# Patient Record
Sex: MALE | Race: WHITE | NOT HISPANIC OR LATINO | URBAN - METROPOLITAN AREA
[De-identification: names, ages, dates, MRNs, and addresses within clinical notes are randomized per-mention and may not be internally consistent; named-entity substitution may affect disease eponyms.]

---

## 2017-01-04 ENCOUNTER — HOSPITAL ENCOUNTER (OUTPATIENT)
Dept: PHYSICAL MEDICINE AND REHAB | Facility: CLINIC | Age: 82
Discharge: HOME | End: 2017-01-04
Attending: PHYSICAL MEDICINE & REHABILITATION

## 2017-01-04 ENCOUNTER — COMMUNICATION - HEALTHEAST (OUTPATIENT)
Dept: INTERNAL MEDICINE | Facility: CLINIC | Age: 82
End: 2017-01-04

## 2017-01-04 DIAGNOSIS — Z79.01 ON WARFARIN THERAPY: ICD-10-CM

## 2017-01-04 DIAGNOSIS — M12.88 OTHER SPECIFIC ARTHROPATHIES, NOT ELSEWHERE CLASSIFIED, OTHER SPECIFIED SITE: ICD-10-CM

## 2017-01-04 DIAGNOSIS — M47.816 ARTHROPATHY OF LUMBAR FACET JOINT: ICD-10-CM

## 2017-01-04 DIAGNOSIS — M47.816 LUMBAR SPONDYLOSIS: ICD-10-CM

## 2017-01-04 ASSESSMENT — MIFFLIN-ST. JEOR: SCORE: 1315.41

## 2017-01-07 ENCOUNTER — OFFICE VISIT - HEALTHEAST (OUTPATIENT)
Dept: FAMILY MEDICINE | Facility: CLINIC | Age: 82
End: 2017-01-07

## 2017-01-07 DIAGNOSIS — R10.2 PELVIC PAIN: ICD-10-CM

## 2017-01-13 ENCOUNTER — COMMUNICATION - HEALTHEAST (OUTPATIENT)
Dept: NURSING | Facility: CLINIC | Age: 82
End: 2017-01-13

## 2017-01-13 ENCOUNTER — AMBULATORY - HEALTHEAST (OUTPATIENT)
Dept: LAB | Facility: CLINIC | Age: 82
End: 2017-01-13

## 2017-01-13 DIAGNOSIS — I48.91 A-FIB (H): ICD-10-CM

## 2017-01-20 ENCOUNTER — OFFICE VISIT - HEALTHEAST (OUTPATIENT)
Dept: INTERNAL MEDICINE | Facility: CLINIC | Age: 82
End: 2017-01-20

## 2017-01-20 ENCOUNTER — COMMUNICATION - HEALTHEAST (OUTPATIENT)
Dept: NURSING | Facility: CLINIC | Age: 82
End: 2017-01-20

## 2017-01-20 ENCOUNTER — COMMUNICATION - HEALTHEAST (OUTPATIENT)
Dept: INTERNAL MEDICINE | Facility: CLINIC | Age: 82
End: 2017-01-20

## 2017-01-20 DIAGNOSIS — I48.91 A-FIB (H): ICD-10-CM

## 2017-01-20 DIAGNOSIS — I10 HTN (HYPERTENSION): ICD-10-CM

## 2017-01-20 DIAGNOSIS — R10.2 PELVIC PAIN: ICD-10-CM

## 2017-01-20 DIAGNOSIS — C61 PROSTATE CANCER (H): ICD-10-CM

## 2017-01-20 DIAGNOSIS — I48.20 CHRONIC ATRIAL FIBRILLATION (H): ICD-10-CM

## 2017-01-24 ENCOUNTER — AMBULATORY - HEALTHEAST (OUTPATIENT)
Dept: LAB | Facility: CLINIC | Age: 82
End: 2017-01-24

## 2017-01-24 ENCOUNTER — COMMUNICATION - HEALTHEAST (OUTPATIENT)
Dept: NURSING | Facility: CLINIC | Age: 82
End: 2017-01-24

## 2017-01-24 DIAGNOSIS — I48.91 A-FIB (H): ICD-10-CM

## 2017-01-30 ENCOUNTER — COMMUNICATION - HEALTHEAST (OUTPATIENT)
Dept: INTERNAL MEDICINE | Facility: CLINIC | Age: 82
End: 2017-01-30

## 2017-01-31 ENCOUNTER — COMMUNICATION - HEALTHEAST (OUTPATIENT)
Dept: NURSING | Facility: CLINIC | Age: 82
End: 2017-01-31

## 2017-01-31 ENCOUNTER — HOSPITAL ENCOUNTER (OUTPATIENT)
Dept: PHYSICAL MEDICINE AND REHAB | Facility: CLINIC | Age: 82
Discharge: HOME | End: 2017-01-31
Attending: NURSE PRACTITIONER

## 2017-01-31 ENCOUNTER — AMBULATORY - HEALTHEAST (OUTPATIENT)
Dept: LAB | Facility: CLINIC | Age: 82
End: 2017-01-31

## 2017-01-31 DIAGNOSIS — M47.816 ARTHROPATHY OF LUMBAR FACET JOINT: ICD-10-CM

## 2017-01-31 DIAGNOSIS — M54.50 CHRONIC BILATERAL LOW BACK PAIN WITHOUT SCIATICA: ICD-10-CM

## 2017-01-31 DIAGNOSIS — G89.29 CHRONIC BILATERAL LOW BACK PAIN WITHOUT SCIATICA: ICD-10-CM

## 2017-01-31 DIAGNOSIS — I48.91 A-FIB (H): ICD-10-CM

## 2017-02-07 ENCOUNTER — COMMUNICATION - HEALTHEAST (OUTPATIENT)
Dept: NURSING | Facility: CLINIC | Age: 82
End: 2017-02-07

## 2017-02-07 DIAGNOSIS — I48.20 CHRONIC ATRIAL FIBRILLATION (H): ICD-10-CM

## 2017-02-10 ENCOUNTER — COMMUNICATION - HEALTHEAST (OUTPATIENT)
Dept: NURSING | Facility: CLINIC | Age: 82
End: 2017-02-10

## 2017-02-10 ENCOUNTER — OFFICE VISIT - HEALTHEAST (OUTPATIENT)
Dept: INTERNAL MEDICINE | Facility: CLINIC | Age: 82
End: 2017-02-10

## 2017-02-10 DIAGNOSIS — C61 PROSTATE CANCER (H): ICD-10-CM

## 2017-02-10 DIAGNOSIS — R19.7 INTERMITTENT DIARRHEA: ICD-10-CM

## 2017-02-10 DIAGNOSIS — I10 HTN (HYPERTENSION): ICD-10-CM

## 2017-02-10 DIAGNOSIS — I48.20 CHRONIC ATRIAL FIBRILLATION (H): ICD-10-CM

## 2017-02-10 DIAGNOSIS — R10.2 SUPRAPUBIC PAIN: ICD-10-CM

## 2017-02-10 DIAGNOSIS — R25.2 LEG CRAMPS: ICD-10-CM

## 2017-02-20 ENCOUNTER — RECORDS - HEALTHEAST (OUTPATIENT)
Dept: ADMINISTRATIVE | Facility: OTHER | Age: 82
End: 2017-02-20

## 2017-03-01 ENCOUNTER — AMBULATORY - HEALTHEAST (OUTPATIENT)
Dept: LAB | Facility: CLINIC | Age: 82
End: 2017-03-01

## 2017-03-01 ENCOUNTER — COMMUNICATION - HEALTHEAST (OUTPATIENT)
Dept: NURSING | Facility: CLINIC | Age: 82
End: 2017-03-01

## 2017-03-01 DIAGNOSIS — I48.20 CHRONIC ATRIAL FIBRILLATION (H): ICD-10-CM

## 2017-03-17 ENCOUNTER — COMMUNICATION - HEALTHEAST (OUTPATIENT)
Dept: CARDIOLOGY | Facility: CLINIC | Age: 82
End: 2017-03-17

## 2017-03-17 DIAGNOSIS — I48.91 ATRIAL FIBRILLATION (H): ICD-10-CM

## 2017-03-20 ENCOUNTER — COMMUNICATION - HEALTHEAST (OUTPATIENT)
Dept: CARDIOLOGY | Facility: CLINIC | Age: 82
End: 2017-03-20

## 2017-03-20 DIAGNOSIS — I48.20 CHRONIC ATRIAL FIBRILLATION (H): ICD-10-CM

## 2017-03-20 DIAGNOSIS — I25.10 CORONARY ATHEROSCLEROSIS: ICD-10-CM

## 2017-03-22 ENCOUNTER — COMMUNICATION - HEALTHEAST (OUTPATIENT)
Dept: NURSING | Facility: CLINIC | Age: 82
End: 2017-03-22

## 2017-03-22 ENCOUNTER — AMBULATORY - HEALTHEAST (OUTPATIENT)
Dept: LAB | Facility: CLINIC | Age: 82
End: 2017-03-22

## 2017-03-22 DIAGNOSIS — I48.20 CHRONIC ATRIAL FIBRILLATION (H): ICD-10-CM

## 2017-04-15 ENCOUNTER — COMMUNICATION - HEALTHEAST (OUTPATIENT)
Dept: INTERNAL MEDICINE | Facility: CLINIC | Age: 82
End: 2017-04-15

## 2017-04-15 DIAGNOSIS — I48.91 ATRIAL FIBRILLATION (H): ICD-10-CM

## 2017-04-19 ENCOUNTER — AMBULATORY - HEALTHEAST (OUTPATIENT)
Dept: LAB | Facility: CLINIC | Age: 82
End: 2017-04-19

## 2017-04-19 ENCOUNTER — COMMUNICATION - HEALTHEAST (OUTPATIENT)
Dept: NURSING | Facility: CLINIC | Age: 82
End: 2017-04-19

## 2017-04-19 DIAGNOSIS — I48.20 CHRONIC ATRIAL FIBRILLATION (H): ICD-10-CM

## 2017-04-27 ENCOUNTER — OFFICE VISIT - HEALTHEAST (OUTPATIENT)
Dept: CARDIOLOGY | Facility: CLINIC | Age: 82
End: 2017-04-27

## 2017-04-27 ENCOUNTER — AMBULATORY - HEALTHEAST (OUTPATIENT)
Dept: CARDIOLOGY | Facility: CLINIC | Age: 82
End: 2017-04-27

## 2017-04-27 DIAGNOSIS — Z95.0 PACEMAKER: ICD-10-CM

## 2017-04-27 DIAGNOSIS — I48.20 CHRONIC ATRIAL FIBRILLATION (H): ICD-10-CM

## 2017-04-27 ASSESSMENT — MIFFLIN-ST. JEOR: SCORE: 1376.08

## 2017-05-02 ENCOUNTER — OFFICE VISIT - HEALTHEAST (OUTPATIENT)
Dept: INTERNAL MEDICINE | Facility: CLINIC | Age: 82
End: 2017-05-02

## 2017-05-02 DIAGNOSIS — S90.425A BLISTER OF TOE OF LEFT FOOT, INITIAL ENCOUNTER: ICD-10-CM

## 2017-05-02 ASSESSMENT — MIFFLIN-ST. JEOR: SCORE: 1371.54

## 2017-05-15 ENCOUNTER — RECORDS - HEALTHEAST (OUTPATIENT)
Dept: ADMINISTRATIVE | Facility: OTHER | Age: 82
End: 2017-05-15

## 2017-05-19 ENCOUNTER — COMMUNICATION - HEALTHEAST (OUTPATIENT)
Dept: NURSING | Facility: CLINIC | Age: 82
End: 2017-05-19

## 2017-05-19 ENCOUNTER — AMBULATORY - HEALTHEAST (OUTPATIENT)
Dept: LAB | Facility: CLINIC | Age: 82
End: 2017-05-19

## 2017-05-19 DIAGNOSIS — I48.20 CHRONIC ATRIAL FIBRILLATION (H): ICD-10-CM

## 2017-05-22 ENCOUNTER — RECORDS - HEALTHEAST (OUTPATIENT)
Dept: ADMINISTRATIVE | Facility: OTHER | Age: 82
End: 2017-05-22

## 2017-05-24 ENCOUNTER — AMBULATORY - HEALTHEAST (OUTPATIENT)
Dept: PODIATRY | Age: 82
End: 2017-05-24

## 2017-05-24 DIAGNOSIS — M20.40 HAMMER TOE, UNSPECIFIED LATERALITY: ICD-10-CM

## 2017-05-24 DIAGNOSIS — L97.501 TOE ULCER, UNSPECIFIED LATERALITY, LIMITED TO BREAKDOWN OF SKIN (H): ICD-10-CM

## 2017-06-09 ENCOUNTER — OFFICE VISIT - HEALTHEAST (OUTPATIENT)
Dept: INTERNAL MEDICINE | Facility: CLINIC | Age: 82
End: 2017-06-09

## 2017-06-09 ENCOUNTER — COMMUNICATION - HEALTHEAST (OUTPATIENT)
Dept: NURSING | Facility: CLINIC | Age: 82
End: 2017-06-09

## 2017-06-09 DIAGNOSIS — I10 HTN (HYPERTENSION): ICD-10-CM

## 2017-06-09 DIAGNOSIS — Z66 DNR (DO NOT RESUSCITATE): ICD-10-CM

## 2017-06-09 DIAGNOSIS — C61 PROSTATE CANCER (H): ICD-10-CM

## 2017-06-09 DIAGNOSIS — I48.20 CHRONIC ATRIAL FIBRILLATION (H): ICD-10-CM

## 2017-06-09 ASSESSMENT — MIFFLIN-ST. JEOR: SCORE: 1358.84

## 2017-06-21 ENCOUNTER — OFFICE VISIT - HEALTHEAST (OUTPATIENT)
Dept: PODIATRY | Age: 82
End: 2017-06-21

## 2017-06-21 DIAGNOSIS — L97.501 TOE ULCER, UNSPECIFIED LATERALITY, LIMITED TO BREAKDOWN OF SKIN (H): ICD-10-CM

## 2017-06-21 DIAGNOSIS — M20.40 HAMMER TOE, UNSPECIFIED LATERALITY: ICD-10-CM

## 2017-07-07 ENCOUNTER — AMBULATORY - HEALTHEAST (OUTPATIENT)
Dept: LAB | Facility: CLINIC | Age: 82
End: 2017-07-07

## 2017-07-07 ENCOUNTER — COMMUNICATION - HEALTHEAST (OUTPATIENT)
Dept: NURSING | Facility: CLINIC | Age: 82
End: 2017-07-07

## 2017-07-07 DIAGNOSIS — I48.20 CHRONIC ATRIAL FIBRILLATION (H): ICD-10-CM

## 2017-07-19 ENCOUNTER — OFFICE VISIT - HEALTHEAST (OUTPATIENT)
Dept: PODIATRY | Age: 82
End: 2017-07-19

## 2017-07-19 DIAGNOSIS — M20.40 HAMMER TOE, UNSPECIFIED LATERALITY: ICD-10-CM

## 2017-07-19 DIAGNOSIS — L97.501 TOE ULCER, UNSPECIFIED LATERALITY, LIMITED TO BREAKDOWN OF SKIN (H): ICD-10-CM

## 2017-07-19 ASSESSMENT — MIFFLIN-ST. JEOR: SCORE: 1353.4

## 2017-07-30 ENCOUNTER — COMMUNICATION - HEALTHEAST (OUTPATIENT)
Dept: INTERNAL MEDICINE | Facility: CLINIC | Age: 82
End: 2017-07-30

## 2017-08-04 ENCOUNTER — AMBULATORY - HEALTHEAST (OUTPATIENT)
Dept: LAB | Facility: CLINIC | Age: 82
End: 2017-08-04

## 2017-08-04 ENCOUNTER — COMMUNICATION - HEALTHEAST (OUTPATIENT)
Dept: INTERNAL MEDICINE | Facility: CLINIC | Age: 82
End: 2017-08-04

## 2017-08-04 DIAGNOSIS — I48.20 CHRONIC ATRIAL FIBRILLATION (H): ICD-10-CM

## 2017-08-14 ENCOUNTER — OFFICE VISIT - HEALTHEAST (OUTPATIENT)
Dept: PODIATRY | Facility: CLINIC | Age: 82
End: 2017-08-14

## 2017-08-14 DIAGNOSIS — L84 CALLUS OF FOOT: ICD-10-CM

## 2017-08-14 DIAGNOSIS — M79.673 PAIN OF FOOT, UNSPECIFIED LATERALITY: ICD-10-CM

## 2017-08-14 DIAGNOSIS — M20.40 HAMMER TOE, UNSPECIFIED LATERALITY: ICD-10-CM

## 2017-08-14 DIAGNOSIS — L60.2 ONYCHAUXIS: ICD-10-CM

## 2017-08-14 ASSESSMENT — MIFFLIN-ST. JEOR: SCORE: 1353.4

## 2017-08-25 ENCOUNTER — COMMUNICATION - HEALTHEAST (OUTPATIENT)
Dept: NURSING | Facility: CLINIC | Age: 82
End: 2017-08-25

## 2017-08-25 ENCOUNTER — OFFICE VISIT - HEALTHEAST (OUTPATIENT)
Dept: INTERNAL MEDICINE | Facility: CLINIC | Age: 82
End: 2017-08-25

## 2017-08-25 DIAGNOSIS — R19.7 INTERMITTENT DIARRHEA: ICD-10-CM

## 2017-08-25 DIAGNOSIS — I50.9 CHF (CONGESTIVE HEART FAILURE) (H): ICD-10-CM

## 2017-08-25 DIAGNOSIS — I48.91 A-FIB (H): ICD-10-CM

## 2017-08-25 DIAGNOSIS — I48.20 CHRONIC ATRIAL FIBRILLATION (H): ICD-10-CM

## 2017-08-25 ASSESSMENT — MIFFLIN-ST. JEOR: SCORE: 1344.33

## 2017-08-28 ENCOUNTER — COMMUNICATION - HEALTHEAST (OUTPATIENT)
Dept: INTERNAL MEDICINE | Facility: CLINIC | Age: 82
End: 2017-08-28

## 2017-09-08 ENCOUNTER — AMBULATORY - HEALTHEAST (OUTPATIENT)
Dept: LAB | Facility: CLINIC | Age: 82
End: 2017-09-08

## 2017-09-08 ENCOUNTER — COMMUNICATION - HEALTHEAST (OUTPATIENT)
Dept: NURSING | Facility: CLINIC | Age: 82
End: 2017-09-08

## 2017-09-08 DIAGNOSIS — I48.20 CHRONIC ATRIAL FIBRILLATION (H): ICD-10-CM

## 2017-09-22 ENCOUNTER — COMMUNICATION - HEALTHEAST (OUTPATIENT)
Dept: NURSING | Facility: CLINIC | Age: 82
End: 2017-09-22

## 2017-09-22 ENCOUNTER — AMBULATORY - HEALTHEAST (OUTPATIENT)
Dept: LAB | Facility: CLINIC | Age: 82
End: 2017-09-22

## 2017-09-22 DIAGNOSIS — I48.20 CHRONIC ATRIAL FIBRILLATION (H): ICD-10-CM

## 2017-10-04 ENCOUNTER — HOSPITAL ENCOUNTER (OUTPATIENT)
Dept: PHYSICAL MEDICINE AND REHAB | Facility: CLINIC | Age: 82
Discharge: HOME | End: 2017-10-04
Attending: NURSE PRACTITIONER

## 2017-10-04 DIAGNOSIS — M47.816 ARTHROPATHY OF LUMBAR FACET JOINT: ICD-10-CM

## 2017-10-04 DIAGNOSIS — Z98.890 HISTORY OF LUMBAR SURGERY: ICD-10-CM

## 2017-10-04 DIAGNOSIS — M54.50 CHRONIC BILATERAL LOW BACK PAIN WITHOUT SCIATICA: ICD-10-CM

## 2017-10-04 DIAGNOSIS — G89.29 CHRONIC BILATERAL LOW BACK PAIN WITHOUT SCIATICA: ICD-10-CM

## 2017-10-06 ENCOUNTER — COMMUNICATION - HEALTHEAST (OUTPATIENT)
Dept: NURSING | Facility: CLINIC | Age: 82
End: 2017-10-06

## 2017-10-06 ENCOUNTER — OFFICE VISIT - HEALTHEAST (OUTPATIENT)
Dept: INTERNAL MEDICINE | Facility: CLINIC | Age: 82
End: 2017-10-06

## 2017-10-06 DIAGNOSIS — Z00.00 ROUTINE PHYSICAL EXAMINATION: ICD-10-CM

## 2017-10-06 DIAGNOSIS — M17.9 KNEE OSTEOARTHRITIS: ICD-10-CM

## 2017-10-06 DIAGNOSIS — Z95.0 PACEMAKER: ICD-10-CM

## 2017-10-06 DIAGNOSIS — M54.50 LOW BACK PAIN: ICD-10-CM

## 2017-10-06 DIAGNOSIS — I63.9 CEREBRAL INFARCTION (H): ICD-10-CM

## 2017-10-06 DIAGNOSIS — I10 HTN (HYPERTENSION): ICD-10-CM

## 2017-10-06 DIAGNOSIS — Z66 DNR (DO NOT RESUSCITATE): ICD-10-CM

## 2017-10-06 DIAGNOSIS — C61 PROSTATE CANCER (H): ICD-10-CM

## 2017-10-06 DIAGNOSIS — I48.20 CHRONIC ATRIAL FIBRILLATION (H): ICD-10-CM

## 2017-10-06 DIAGNOSIS — E53.8 VITAMIN B12 DEFICIENCY: ICD-10-CM

## 2017-10-06 ASSESSMENT — MIFFLIN-ST. JEOR: SCORE: 1314.17

## 2017-10-10 ENCOUNTER — OFFICE VISIT - HEALTHEAST (OUTPATIENT)
Dept: PHYSICAL THERAPY | Facility: CLINIC | Age: 82
End: 2017-10-10

## 2017-10-10 DIAGNOSIS — G89.29 CHRONIC BILATERAL LOW BACK PAIN WITHOUT SCIATICA: ICD-10-CM

## 2017-10-10 DIAGNOSIS — M54.50 CHRONIC BILATERAL LOW BACK PAIN WITHOUT SCIATICA: ICD-10-CM

## 2017-10-10 DIAGNOSIS — R29.3 POOR POSTURE: ICD-10-CM

## 2017-10-14 ENCOUNTER — COMMUNICATION - HEALTHEAST (OUTPATIENT)
Dept: INTERNAL MEDICINE | Facility: CLINIC | Age: 82
End: 2017-10-14

## 2017-10-14 DIAGNOSIS — I48.91 ATRIAL FIBRILLATION (H): ICD-10-CM

## 2017-10-16 ENCOUNTER — AMBULATORY - HEALTHEAST (OUTPATIENT)
Dept: PODIATRY | Facility: CLINIC | Age: 82
End: 2017-10-16

## 2017-10-16 DIAGNOSIS — L84 CALLUS OF FOOT: ICD-10-CM

## 2017-10-16 DIAGNOSIS — M79.673 PAIN OF FOOT, UNSPECIFIED LATERALITY: ICD-10-CM

## 2017-10-16 DIAGNOSIS — L60.2 ONYCHAUXIS: ICD-10-CM

## 2017-10-16 DIAGNOSIS — M20.40 HAMMER TOE, UNSPECIFIED LATERALITY: ICD-10-CM

## 2017-10-16 ASSESSMENT — MIFFLIN-ST. JEOR: SCORE: 1311.44

## 2017-10-20 ENCOUNTER — AMBULATORY - HEALTHEAST (OUTPATIENT)
Dept: CARDIOLOGY | Facility: CLINIC | Age: 82
End: 2017-10-20

## 2017-10-20 DIAGNOSIS — Z95.0 PACEMAKER: ICD-10-CM

## 2017-10-20 LAB — HCC DEVICE COMMENTS: NORMAL

## 2017-10-20 ASSESSMENT — MIFFLIN-ST. JEOR: SCORE: 1329.59

## 2017-10-27 ENCOUNTER — OFFICE VISIT - HEALTHEAST (OUTPATIENT)
Dept: CARDIOLOGY | Facility: CLINIC | Age: 82
End: 2017-10-27

## 2017-10-27 DIAGNOSIS — I48.91 A-FIB (H): ICD-10-CM

## 2017-10-27 DIAGNOSIS — Z95.0 PACEMAKER: ICD-10-CM

## 2017-10-27 ASSESSMENT — MIFFLIN-ST. JEOR: SCORE: 1325.05

## 2017-10-31 ENCOUNTER — COMMUNICATION - HEALTHEAST (OUTPATIENT)
Dept: PHYSICAL MEDICINE AND REHAB | Facility: CLINIC | Age: 82
End: 2017-10-31

## 2017-11-01 ENCOUNTER — HOSPITAL ENCOUNTER (OUTPATIENT)
Dept: PHYSICAL MEDICINE AND REHAB | Facility: CLINIC | Age: 82
Discharge: HOME | End: 2017-11-01
Attending: NURSE PRACTITIONER

## 2017-11-01 ENCOUNTER — HOSPITAL ENCOUNTER (OUTPATIENT)
Dept: CARDIOLOGY | Facility: HOSPITAL | Age: 82
Discharge: HOME | End: 2017-11-01
Attending: INTERNAL MEDICINE

## 2017-11-01 DIAGNOSIS — M48.061 FORAMINAL STENOSIS OF LUMBAR REGION: ICD-10-CM

## 2017-11-01 DIAGNOSIS — M54.16 RIGHT LUMBAR RADICULITIS: ICD-10-CM

## 2017-11-01 DIAGNOSIS — M54.50 CHRONIC BILATERAL LOW BACK PAIN WITHOUT SCIATICA: ICD-10-CM

## 2017-11-01 DIAGNOSIS — I48.91 A-FIB (H): ICD-10-CM

## 2017-11-01 DIAGNOSIS — M54.41 ACUTE RIGHT-SIDED LOW BACK PAIN WITH RIGHT-SIDED SCIATICA: ICD-10-CM

## 2017-11-01 DIAGNOSIS — G89.29 CHRONIC BILATERAL LOW BACK PAIN WITHOUT SCIATICA: ICD-10-CM

## 2017-11-01 DIAGNOSIS — M41.9 SCOLIOSIS: ICD-10-CM

## 2017-11-01 LAB
AORTIC ROOT: 3.3 CM
BSA FOR ECHO PROCEDURE: 1.85 M2
CV BLOOD PRESSURE: NORMAL MMHG
CV ECHO HEIGHT: 68.5 IN
CV ECHO WEIGHT: 156 LBS
DOP CALC LVOT AREA: 3.14 CM2
DOP CALC LVOT DIAMETER: 2 CM
DOP CALC LVOT PEAK VEL: 69.5 CM/S
DOP CALC LVOT STROKE VOLUME: 41.8 CM3
DOP CALCLVOT PEAK VEL VTI: 13.3 CM
EJECTION FRACTION: 66 % (ref 55–75)
FRACTIONAL SHORTENING: 38.4 % (ref 28–44)
INTERVENTRICULAR SEPTUM IN END DIASTOLE: 1.22 CM (ref 0.6–1)
IVS/PW RATIO: 1
LEFT ATRIUM SIZE: 4 CM
LEFT ATRIUM TO AORTIC ROOT RATIO: 1.21 NO UNITS
LEFT VENTRICLE CARDIAC INDEX: 1.7 L/MIN/M2
LEFT VENTRICLE CARDIAC OUTPUT: 3.2 L/MIN
LEFT VENTRICLE DIASTOLIC VOLUME INDEX: 52.6 CM3/M2 (ref 34–74)
LEFT VENTRICLE DIASTOLIC VOLUME: 97.4 CM3 (ref 62–150)
LEFT VENTRICLE HEART RATE: 76 BPM
LEFT VENTRICLE MASS INDEX: 112.7 G/M2
LEFT VENTRICLE SYSTOLIC VOLUME INDEX: 17.7 CM3/M2 (ref 11–31)
LEFT VENTRICLE SYSTOLIC VOLUME: 32.7 CM3 (ref 21–61)
LEFT VENTRICULAR INTERNAL DIMENSION IN DIASTOLE: 4.58 CM (ref 4.2–5.8)
LEFT VENTRICULAR INTERNAL DIMENSION IN SYSTOLE: 2.82 CM (ref 2.5–4)
LEFT VENTRICULAR MASS: 208.5 G
LEFT VENTRICULAR OUTFLOW TRACT MEAN GRADIENT: 1 MMHG
LEFT VENTRICULAR OUTFLOW TRACT MEAN VELOCITY: 45 CM/S
LEFT VENTRICULAR OUTFLOW TRACT PEAK GRADIENT: 2 MMHG
LEFT VENTRICULAR POSTERIOR WALL IN END DIASTOLE: 1.22 CM (ref 0.6–1)
LV STROKE VOLUME INDEX: 22.6 ML/M2
MV E'TISSUE VEL-LAT: 14.6 CM/S
MV E'TISSUE VEL-MED: 9.77 CM/S
NUC REST DIASTOLIC VOLUME INDEX: 2496 LBS
NUC REST SYSTOLIC VOLUME INDEX: 68.5 IN
PR MAX PG: 7 MMHG
PR PEAK VELOCITY: 128 CM/S
PR PEAK VELOCITY: 128 CM/S
TRICUSPID REGURGITATION PEAK PRESSURE GRADIENT: 38.7 MMHG
TRICUSPID VALVE ANULAR PLANE SYSTOLIC EXCURSION: 1.5 CM
TRICUSPID VALVE PEAK REGURGITANT VELOCITY: 311 CM/S

## 2017-11-01 ASSESSMENT — MIFFLIN-ST. JEOR: SCORE: 1325.05

## 2017-11-03 ENCOUNTER — COMMUNICATION - HEALTHEAST (OUTPATIENT)
Dept: NURSING | Facility: CLINIC | Age: 82
End: 2017-11-03

## 2017-11-03 ENCOUNTER — AMBULATORY - HEALTHEAST (OUTPATIENT)
Dept: LAB | Facility: CLINIC | Age: 82
End: 2017-11-03

## 2017-11-03 DIAGNOSIS — I48.20 CHRONIC ATRIAL FIBRILLATION (H): ICD-10-CM

## 2017-11-06 ENCOUNTER — COMMUNICATION - HEALTHEAST (OUTPATIENT)
Dept: INTERNAL MEDICINE | Facility: CLINIC | Age: 82
End: 2017-11-06

## 2017-11-06 ENCOUNTER — AMBULATORY - HEALTHEAST (OUTPATIENT)
Dept: CARDIOLOGY | Facility: CLINIC | Age: 82
End: 2017-11-06

## 2017-11-06 DIAGNOSIS — I48.20 CHRONIC ATRIAL FIBRILLATION (H): ICD-10-CM

## 2017-11-06 DIAGNOSIS — I10 HTN (HYPERTENSION): ICD-10-CM

## 2017-11-07 ENCOUNTER — AMBULATORY - HEALTHEAST (OUTPATIENT)
Dept: LAB | Facility: CLINIC | Age: 82
End: 2017-11-07

## 2017-11-07 ENCOUNTER — COMMUNICATION - HEALTHEAST (OUTPATIENT)
Dept: NURSING | Facility: CLINIC | Age: 82
End: 2017-11-07

## 2017-11-07 ENCOUNTER — COMMUNICATION - HEALTHEAST (OUTPATIENT)
Dept: INTERNAL MEDICINE | Facility: CLINIC | Age: 82
End: 2017-11-07

## 2017-11-07 DIAGNOSIS — I48.20 CHRONIC ATRIAL FIBRILLATION (H): ICD-10-CM

## 2017-11-07 DIAGNOSIS — I10 HTN (HYPERTENSION): ICD-10-CM

## 2017-11-10 ENCOUNTER — OFFICE VISIT - HEALTHEAST (OUTPATIENT)
Dept: INTERNAL MEDICINE | Facility: CLINIC | Age: 82
End: 2017-11-10

## 2017-11-10 DIAGNOSIS — I63.9 CEREBRAL INFARCTION (H): ICD-10-CM

## 2017-11-10 DIAGNOSIS — I10 HTN (HYPERTENSION): ICD-10-CM
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
## 2017-11-10 DIAGNOSIS — S51.811A SKIN TEAR OF RIGHT FOREARM WITHOUT COMPLICATION: ICD-10-CM

## 2017-11-10 DIAGNOSIS — I48.20 CHRONIC ATRIAL FIBRILLATION (H): ICD-10-CM

## 2017-11-14 ENCOUNTER — RECORDS - HEALTHEAST (OUTPATIENT)
Dept: ADMINISTRATIVE | Facility: OTHER | Age: 82
End: 2017-11-14

## 2017-11-14 ENCOUNTER — OFFICE VISIT - HEALTHEAST (OUTPATIENT)
Dept: INTERNAL MEDICINE | Facility: CLINIC | Age: 82
End: 2017-11-14

## 2017-11-14 DIAGNOSIS — R05.9 COUGH: ICD-10-CM

## 2017-11-14 ASSESSMENT — MIFFLIN-ST. JEOR: SCORE: 1352.27

## 2017-11-15 ENCOUNTER — HOSPITAL ENCOUNTER (OUTPATIENT)
Dept: PHYSICAL MEDICINE AND REHAB | Facility: CLINIC | Age: 82
Discharge: HOME | End: 2017-11-15
Attending: NURSE PRACTITIONER

## 2017-11-15 DIAGNOSIS — M54.50 CHRONIC BILATERAL LOW BACK PAIN WITHOUT SCIATICA: ICD-10-CM

## 2017-11-15 DIAGNOSIS — G89.29 CHRONIC BILATERAL LOW BACK PAIN WITHOUT SCIATICA: ICD-10-CM

## 2017-11-15 DIAGNOSIS — M54.41 ACUTE RIGHT-SIDED LOW BACK PAIN WITH RIGHT-SIDED SCIATICA: ICD-10-CM

## 2017-12-01 ENCOUNTER — AMBULATORY - HEALTHEAST (OUTPATIENT)
Dept: LAB | Facility: CLINIC | Age: 82
End: 2017-12-01

## 2017-12-01 ENCOUNTER — COMMUNICATION - HEALTHEAST (OUTPATIENT)
Dept: NURSING | Facility: CLINIC | Age: 82
End: 2017-12-01

## 2017-12-01 DIAGNOSIS — I48.20 CHRONIC ATRIAL FIBRILLATION (H): ICD-10-CM

## 2017-12-18 ENCOUNTER — AMBULATORY - HEALTHEAST (OUTPATIENT)
Dept: PODIATRY | Facility: CLINIC | Age: 82
End: 2017-12-18

## 2017-12-18 DIAGNOSIS — L84 CALLUS OF FOOT: ICD-10-CM

## 2017-12-18 DIAGNOSIS — L60.2 ONYCHAUXIS: ICD-10-CM

## 2017-12-18 DIAGNOSIS — M79.673 PAIN OF FOOT, UNSPECIFIED LATERALITY: ICD-10-CM

## 2017-12-18 DIAGNOSIS — M20.40 HAMMER TOE, UNSPECIFIED LATERALITY: ICD-10-CM

## 2017-12-18 ASSESSMENT — MIFFLIN-ST. JEOR: SCORE: 1347.73

## 2018-01-05 ENCOUNTER — COMMUNICATION - HEALTHEAST (OUTPATIENT)
Dept: NURSING | Facility: CLINIC | Age: 83
End: 2018-01-05

## 2018-01-05 ENCOUNTER — AMBULATORY - HEALTHEAST (OUTPATIENT)
Dept: LAB | Facility: CLINIC | Age: 83
End: 2018-01-05

## 2018-01-05 DIAGNOSIS — I48.20 CHRONIC ATRIAL FIBRILLATION (H): ICD-10-CM

## 2018-01-05 LAB — INR PPP: 2.1 (ref 0.9–1.1)

## 2018-01-08 ENCOUNTER — COMMUNICATION - HEALTHEAST (OUTPATIENT)
Dept: NURSING | Facility: CLINIC | Age: 83
End: 2018-01-08

## 2018-01-08 DIAGNOSIS — I48.20 CHRONIC ATRIAL FIBRILLATION (H): ICD-10-CM

## 2018-01-15 ENCOUNTER — OFFICE VISIT - HEALTHEAST (OUTPATIENT)
Dept: INTERNAL MEDICINE | Facility: CLINIC | Age: 83
End: 2018-01-15

## 2018-01-15 DIAGNOSIS — R05.9 COUGH: ICD-10-CM

## 2018-01-21 ENCOUNTER — COMMUNICATION - HEALTHEAST (OUTPATIENT)
Dept: INTERNAL MEDICINE | Facility: CLINIC | Age: 83
End: 2018-01-21

## 2018-01-21 DIAGNOSIS — C61 PROSTATE CANCER (H): ICD-10-CM

## 2018-02-12 ENCOUNTER — COMMUNICATION - HEALTHEAST (OUTPATIENT)
Dept: CARDIOLOGY | Facility: CLINIC | Age: 83
End: 2018-02-12

## 2018-02-12 DIAGNOSIS — I25.10 CORONARY ATHEROSCLEROSIS: ICD-10-CM

## 2018-02-12 DIAGNOSIS — I48.20 CHRONIC ATRIAL FIBRILLATION (H): ICD-10-CM

## 2018-02-13 ENCOUNTER — RECORDS - HEALTHEAST (OUTPATIENT)
Dept: ADMINISTRATIVE | Facility: OTHER | Age: 83
End: 2018-02-13

## 2018-02-16 ENCOUNTER — AMBULATORY - HEALTHEAST (OUTPATIENT)
Dept: LAB | Facility: CLINIC | Age: 83
End: 2018-02-16

## 2018-02-16 ENCOUNTER — COMMUNICATION - HEALTHEAST (OUTPATIENT)
Dept: NURSING | Facility: CLINIC | Age: 83
End: 2018-02-16

## 2018-02-16 DIAGNOSIS — I48.20 CHRONIC ATRIAL FIBRILLATION (H): ICD-10-CM

## 2018-02-16 LAB — INR PPP: 2 (ref 0.9–1.1)

## 2018-02-19 ENCOUNTER — AMBULATORY - HEALTHEAST (OUTPATIENT)
Dept: PODIATRY | Facility: CLINIC | Age: 83
End: 2018-02-19

## 2018-02-19 DIAGNOSIS — L60.2 ONYCHAUXIS: ICD-10-CM

## 2018-02-19 DIAGNOSIS — M79.673 PAIN OF FOOT, UNSPECIFIED LATERALITY: ICD-10-CM

## 2018-02-19 ASSESSMENT — MIFFLIN-ST. JEOR: SCORE: 1365.88

## 2018-03-08 ENCOUNTER — COMMUNICATION - HEALTHEAST (OUTPATIENT)
Dept: INTERNAL MEDICINE | Facility: CLINIC | Age: 83
End: 2018-03-08

## 2018-03-08 DIAGNOSIS — I48.91 ATRIAL FIBRILLATION (H): ICD-10-CM

## 2018-03-15 ENCOUNTER — COMMUNICATION - HEALTHEAST (OUTPATIENT)
Dept: SCHEDULING | Facility: CLINIC | Age: 83
End: 2018-03-15

## 2018-03-30 ENCOUNTER — COMMUNICATION - HEALTHEAST (OUTPATIENT)
Dept: ANTICOAGULATION | Facility: CLINIC | Age: 83
End: 2018-03-30

## 2018-03-30 ENCOUNTER — AMBULATORY - HEALTHEAST (OUTPATIENT)
Dept: LAB | Facility: CLINIC | Age: 83
End: 2018-03-30

## 2018-03-30 DIAGNOSIS — I48.20 CHRONIC ATRIAL FIBRILLATION (H): ICD-10-CM

## 2018-03-30 LAB — INR PPP: 2 (ref 0.9–1.1)

## 2018-04-25 ENCOUNTER — AMBULATORY - HEALTHEAST (OUTPATIENT)
Dept: CARDIOLOGY | Facility: CLINIC | Age: 83
End: 2018-04-25

## 2018-04-25 ENCOUNTER — OFFICE VISIT - HEALTHEAST (OUTPATIENT)
Dept: CARDIOLOGY | Facility: CLINIC | Age: 83
End: 2018-04-25

## 2018-04-25 DIAGNOSIS — Z95.0 PACEMAKER: ICD-10-CM

## 2018-04-25 DIAGNOSIS — I10 ESSENTIAL HYPERTENSION: ICD-10-CM

## 2018-04-25 DIAGNOSIS — I48.19 PERSISTENT ATRIAL FIBRILLATION (H): ICD-10-CM

## 2018-04-25 LAB
DIGOXIN LEVEL LHE- HISTORICAL: 0.9 NG/ML (ref 0.5–2)
HCC DEVICE COMMENTS: NORMAL

## 2018-04-25 ASSESSMENT — MIFFLIN-ST. JEOR: SCORE: 1365.88

## 2018-04-27 ENCOUNTER — OFFICE VISIT - HEALTHEAST (OUTPATIENT)
Dept: CARDIOLOGY | Facility: CLINIC | Age: 83
End: 2018-04-27

## 2018-04-27 DIAGNOSIS — Z79.899 MEDICATION MANAGEMENT: ICD-10-CM

## 2018-05-03 ENCOUNTER — COMMUNICATION - HEALTHEAST (OUTPATIENT)
Dept: CARDIOLOGY | Facility: CLINIC | Age: 83
End: 2018-05-03

## 2018-05-08 ENCOUNTER — AMBULATORY - HEALTHEAST (OUTPATIENT)
Dept: ANTICOAGULATION | Facility: CLINIC | Age: 83
End: 2018-05-08

## 2018-05-15 ENCOUNTER — COMMUNICATION - HEALTHEAST (OUTPATIENT)
Dept: CARDIOLOGY | Facility: CLINIC | Age: 83
End: 2018-05-15

## 2018-05-15 ENCOUNTER — COMMUNICATION - HEALTHEAST (OUTPATIENT)
Dept: SCHEDULING | Facility: CLINIC | Age: 83
End: 2018-05-15

## 2018-05-15 ENCOUNTER — OFFICE VISIT - HEALTHEAST (OUTPATIENT)
Dept: CARDIOLOGY | Facility: CLINIC | Age: 83
End: 2018-05-15

## 2018-05-15 DIAGNOSIS — I10 HYPERTENSION: ICD-10-CM

## 2018-05-15 DIAGNOSIS — I10 HTN (HYPERTENSION): ICD-10-CM

## 2018-05-15 DIAGNOSIS — I10 ESSENTIAL HYPERTENSION: ICD-10-CM

## 2018-05-15 DIAGNOSIS — I48.20 CHRONIC ATRIAL FIBRILLATION (H): ICD-10-CM

## 2018-05-15 ASSESSMENT — MIFFLIN-ST. JEOR: SCORE: 1346.6

## 2018-05-19 ENCOUNTER — COMMUNICATION - HEALTHEAST (OUTPATIENT)
Dept: CARDIOLOGY | Facility: CLINIC | Age: 83
End: 2018-05-19

## 2018-05-19 DIAGNOSIS — I48.20 CHRONIC ATRIAL FIBRILLATION (H): ICD-10-CM

## 2018-05-22 ENCOUNTER — OFFICE VISIT - HEALTHEAST (OUTPATIENT)
Dept: INTERNAL MEDICINE | Facility: CLINIC | Age: 83
End: 2018-05-22

## 2018-05-22 ENCOUNTER — COMMUNICATION - HEALTHEAST (OUTPATIENT)
Dept: ANTICOAGULATION | Facility: CLINIC | Age: 83
End: 2018-05-22

## 2018-05-22 ENCOUNTER — AMBULATORY - HEALTHEAST (OUTPATIENT)
Dept: LAB | Facility: CLINIC | Age: 83
End: 2018-05-22

## 2018-05-22 DIAGNOSIS — M54.50 LOW BACK PAIN: ICD-10-CM

## 2018-05-22 DIAGNOSIS — R41.3 SHORT-TERM MEMORY LOSS: ICD-10-CM

## 2018-05-22 DIAGNOSIS — I48.20 CHRONIC ATRIAL FIBRILLATION (H): ICD-10-CM

## 2018-05-22 DIAGNOSIS — Z91.199 PROBLEM WITH MEDICAL CARE COMPLIANCE: ICD-10-CM

## 2018-05-22 DIAGNOSIS — I10 HTN (HYPERTENSION): ICD-10-CM

## 2018-05-22 DIAGNOSIS — M48.00 MULTILEVEL FORAMINAL STENOSIS: ICD-10-CM

## 2018-05-22 LAB — INR PPP: 2 (ref 0.9–1.1)

## 2018-05-30 ENCOUNTER — OFFICE VISIT - HEALTHEAST (OUTPATIENT)
Dept: CARDIOLOGY | Facility: CLINIC | Age: 83
End: 2018-05-30

## 2018-05-30 DIAGNOSIS — I48.20 CHRONIC ATRIAL FIBRILLATION (H): ICD-10-CM

## 2018-05-30 DIAGNOSIS — I10 HTN (HYPERTENSION): ICD-10-CM

## 2018-05-30 ASSESSMENT — MIFFLIN-ST. JEOR: SCORE: 1351.14

## 2018-06-11 ENCOUNTER — RECORDS - HEALTHEAST (OUTPATIENT)
Dept: ADMINISTRATIVE | Facility: OTHER | Age: 83
End: 2018-06-11

## 2018-06-18 ENCOUNTER — RECORDS - HEALTHEAST (OUTPATIENT)
Dept: ADMINISTRATIVE | Facility: OTHER | Age: 83
End: 2018-06-18

## 2018-06-29 ENCOUNTER — COMMUNICATION - HEALTHEAST (OUTPATIENT)
Dept: INTERNAL MEDICINE | Facility: CLINIC | Age: 83
End: 2018-06-29

## 2018-06-29 DIAGNOSIS — I10 HTN (HYPERTENSION): ICD-10-CM

## 2018-07-02 ENCOUNTER — COMMUNICATION - HEALTHEAST (OUTPATIENT)
Dept: CARDIOLOGY | Facility: CLINIC | Age: 83
End: 2018-07-02

## 2018-07-02 DIAGNOSIS — I10 HTN (HYPERTENSION): ICD-10-CM

## 2018-07-03 ENCOUNTER — COMMUNICATION - HEALTHEAST (OUTPATIENT)
Dept: ANTICOAGULATION | Facility: CLINIC | Age: 83
End: 2018-07-03

## 2018-07-03 ENCOUNTER — AMBULATORY - HEALTHEAST (OUTPATIENT)
Dept: LAB | Facility: CLINIC | Age: 83
End: 2018-07-03

## 2018-07-03 ENCOUNTER — AMBULATORY - HEALTHEAST (OUTPATIENT)
Dept: NURSING | Facility: CLINIC | Age: 83
End: 2018-07-03

## 2018-07-03 ENCOUNTER — COMMUNICATION - HEALTHEAST (OUTPATIENT)
Dept: INTERNAL MEDICINE | Facility: CLINIC | Age: 83
End: 2018-07-03

## 2018-07-03 DIAGNOSIS — I48.20 CHRONIC ATRIAL FIBRILLATION (H): ICD-10-CM

## 2018-07-03 LAB — INR PPP: 2.5 (ref 0.9–1.1)

## 2018-07-15 ENCOUNTER — COMMUNICATION - HEALTHEAST (OUTPATIENT)
Dept: INTERNAL MEDICINE | Facility: CLINIC | Age: 83
End: 2018-07-15

## 2018-07-15 DIAGNOSIS — C61 PROSTATE CANCER (H): ICD-10-CM

## 2018-08-09 ENCOUNTER — COMMUNICATION - HEALTHEAST (OUTPATIENT)
Dept: INTERNAL MEDICINE | Facility: CLINIC | Age: 83
End: 2018-08-09

## 2018-08-14 ENCOUNTER — OFFICE VISIT - HEALTHEAST (OUTPATIENT)
Dept: CARDIOLOGY | Facility: CLINIC | Age: 83
End: 2018-08-14

## 2018-08-14 DIAGNOSIS — I10 ESSENTIAL HYPERTENSION: ICD-10-CM

## 2018-08-14 DIAGNOSIS — Z95.0 PACEMAKER: ICD-10-CM

## 2018-08-14 DIAGNOSIS — I48.20 CHRONIC ATRIAL FIBRILLATION (H): ICD-10-CM

## 2018-08-14 ASSESSMENT — MIFFLIN-ST. JEOR: SCORE: 1323.92

## 2018-08-15 ENCOUNTER — COMMUNICATION - HEALTHEAST (OUTPATIENT)
Dept: INTERNAL MEDICINE | Facility: CLINIC | Age: 83
End: 2018-08-15

## 2018-08-15 DIAGNOSIS — I48.91 ATRIAL FIBRILLATION (H): ICD-10-CM

## 2018-08-16 RX ORDER — WARFARIN SODIUM 2.5 MG/1
1.25-2.5 TABLET ORAL DAILY
Qty: 75 TABLET | Refills: 1 | Status: SHIPPED | OUTPATIENT
Start: 2018-08-16

## 2018-08-28 ENCOUNTER — COMMUNICATION - HEALTHEAST (OUTPATIENT)
Dept: ANTICOAGULATION | Facility: CLINIC | Age: 83
End: 2018-08-28

## 2018-08-28 ENCOUNTER — COMMUNICATION - HEALTHEAST (OUTPATIENT)
Dept: INTERNAL MEDICINE | Facility: CLINIC | Age: 83
End: 2018-08-28

## 2018-08-28 ENCOUNTER — AMBULATORY - HEALTHEAST (OUTPATIENT)
Dept: LAB | Facility: CLINIC | Age: 83
End: 2018-08-28

## 2018-08-28 DIAGNOSIS — I48.20 CHRONIC ATRIAL FIBRILLATION (H): ICD-10-CM

## 2018-08-28 LAB — INR PPP: 2.7 (ref 0.9–1.1)

## 2018-09-04 ENCOUNTER — AMBULATORY - HEALTHEAST (OUTPATIENT)
Dept: INTERNAL MEDICINE | Facility: CLINIC | Age: 83
End: 2018-09-04

## 2018-09-10 ENCOUNTER — RECORDS - HEALTHEAST (OUTPATIENT)
Dept: ADMINISTRATIVE | Facility: OTHER | Age: 83
End: 2018-09-10

## 2018-09-20 ENCOUNTER — COMMUNICATION - HEALTHEAST (OUTPATIENT)
Dept: SCHEDULING | Facility: CLINIC | Age: 83
End: 2018-09-20

## 2018-09-21 ENCOUNTER — COMMUNICATION - HEALTHEAST (OUTPATIENT)
Dept: INTERNAL MEDICINE | Facility: CLINIC | Age: 83
End: 2018-09-21

## 2018-09-28 ENCOUNTER — OFFICE VISIT - HEALTHEAST (OUTPATIENT)
Dept: CARDIOLOGY | Facility: CLINIC | Age: 83
End: 2018-09-28

## 2018-09-28 ENCOUNTER — AMBULATORY - HEALTHEAST (OUTPATIENT)
Dept: CARDIOLOGY | Facility: CLINIC | Age: 83
End: 2018-09-28

## 2018-09-28 DIAGNOSIS — I48.20 CHRONIC ATRIAL FIBRILLATION (H): ICD-10-CM

## 2018-09-28 DIAGNOSIS — Z79.01 ANTICOAGULATION GOAL OF INR 2 TO 3: ICD-10-CM

## 2018-09-28 DIAGNOSIS — Z95.0 PACEMAKER: ICD-10-CM

## 2018-09-28 DIAGNOSIS — Z95.0 CARDIAC PACEMAKER IN SITU: ICD-10-CM

## 2018-09-28 DIAGNOSIS — Z51.81 ANTICOAGULATION GOAL OF INR 2 TO 3: ICD-10-CM

## 2018-09-28 LAB
HCC DEVICE COMMENTS: NORMAL
HCC DEVICE IMPLANTING PROVIDER: NORMAL
HCC DEVICE MANUFACTURE: NORMAL
HCC DEVICE MODEL: NORMAL
HCC DEVICE SERIAL NUMBER: NORMAL
HCC DEVICE TYPE: NORMAL

## 2018-09-28 ASSESSMENT — MIFFLIN-ST. JEOR: SCORE: 1346.6

## 2018-10-02 ENCOUNTER — OFFICE VISIT - HEALTHEAST (OUTPATIENT)
Dept: INTERNAL MEDICINE | Facility: CLINIC | Age: 83
End: 2018-10-02

## 2018-10-02 ENCOUNTER — COMMUNICATION - HEALTHEAST (OUTPATIENT)
Dept: ANTICOAGULATION | Facility: CLINIC | Age: 83
End: 2018-10-02

## 2018-10-02 DIAGNOSIS — Z66 DNR (DO NOT RESUSCITATE): ICD-10-CM

## 2018-10-02 DIAGNOSIS — M54.50 LOW BACK PAIN: ICD-10-CM

## 2018-10-02 DIAGNOSIS — Z00.00 MEDICARE ANNUAL WELLNESS VISIT, SUBSEQUENT: ICD-10-CM

## 2018-10-02 DIAGNOSIS — Z23 NEED FOR INFLUENZA VACCINATION: ICD-10-CM

## 2018-10-02 DIAGNOSIS — R41.3 MEMORY DIFFICULTIES: ICD-10-CM

## 2018-10-02 DIAGNOSIS — D69.9 BLEEDING DIATHESIS (H): ICD-10-CM

## 2018-10-02 DIAGNOSIS — M17.9 KNEE OSTEOARTHRITIS: ICD-10-CM

## 2018-10-02 DIAGNOSIS — I48.20 CHRONIC ATRIAL FIBRILLATION (H): ICD-10-CM

## 2018-10-02 DIAGNOSIS — I63.9 CEREBRAL INFARCTION (H): ICD-10-CM

## 2018-10-02 DIAGNOSIS — Z95.0 PACEMAKER: ICD-10-CM

## 2018-10-02 DIAGNOSIS — D64.9 NORMOCYTIC ANEMIA: ICD-10-CM

## 2018-10-02 DIAGNOSIS — I10 HTN (HYPERTENSION): ICD-10-CM

## 2018-10-02 DIAGNOSIS — C61 PROSTATE CANCER (H): ICD-10-CM

## 2018-10-02 DIAGNOSIS — H54.62 VISUAL LOSS, LEFT EYE: ICD-10-CM

## 2018-10-02 DIAGNOSIS — M48.00 MULTILEVEL FORAMINAL STENOSIS: ICD-10-CM

## 2018-10-02 DIAGNOSIS — E53.8 VITAMIN B12 DEFICIENCY: ICD-10-CM

## 2018-10-02 LAB
ALBUMIN SERPL-MCNC: 4.2 G/DL (ref 3.5–5)
ALP SERPL-CCNC: 136 U/L (ref 45–120)
ALT SERPL W P-5'-P-CCNC: 15 U/L (ref 0–45)
ANION GAP SERPL CALCULATED.3IONS-SCNC: 12 MMOL/L (ref 5–18)
AST SERPL W P-5'-P-CCNC: 25 U/L (ref 0–40)
BILIRUB SERPL-MCNC: 1 MG/DL (ref 0–1)
BUN SERPL-MCNC: 24 MG/DL (ref 8–28)
CALCIUM SERPL-MCNC: 10.3 MG/DL (ref 8.5–10.5)
CHLORIDE BLD-SCNC: 103 MMOL/L (ref 98–107)
CO2 SERPL-SCNC: 22 MMOL/L (ref 22–31)
CREAT SERPL-MCNC: 1.16 MG/DL (ref 0.7–1.3)
DIGOXIN LEVEL LHE- HISTORICAL: 0.6 NG/ML (ref 0.5–2)
ERYTHROCYTE [DISTWIDTH] IN BLOOD BY AUTOMATED COUNT: 11.7 % (ref 11–14.5)
GFR SERPL CREATININE-BSD FRML MDRD: 59 ML/MIN/1.73M2
GLUCOSE BLD-MCNC: 98 MG/DL (ref 70–125)
HCT VFR BLD AUTO: 33.8 % (ref 40–54)
HGB BLD-MCNC: 11.4 G/DL (ref 14–18)
INR PPP: 2.1 (ref 0.9–1.1)
IRON SATN MFR SERPL: 34 % (ref 20–50)
IRON SERPL-MCNC: 82 UG/DL (ref 42–175)
MCH RBC QN AUTO: 32.1 PG (ref 27–34)
MCHC RBC AUTO-ENTMCNC: 33.7 G/DL (ref 32–36)
MCV RBC AUTO: 95 FL (ref 80–100)
PLATELET # BLD AUTO: 403 THOU/UL (ref 140–440)
PMV BLD AUTO: 8.1 FL (ref 7–10)
POTASSIUM BLD-SCNC: 4.6 MMOL/L (ref 3.5–5)
PROT SERPL-MCNC: 7.6 G/DL (ref 6–8)
RBC # BLD AUTO: 3.55 MILL/UL (ref 4.4–6.2)
SODIUM SERPL-SCNC: 137 MMOL/L (ref 136–145)
TIBC SERPL-MCNC: 240 UG/DL (ref 313–563)
TRANSFERRIN SERPL-MCNC: 192 MG/DL (ref 212–360)
WBC: 8.1 THOU/UL (ref 4–11)

## 2018-10-02 ASSESSMENT — MIFFLIN-ST. JEOR: SCORE: 1360.54

## 2018-10-03 LAB
FERRITIN SERPL-MCNC: 542 NG/ML (ref 27–300)
VIT B12 SERPL-MCNC: 796 PG/ML (ref 213–816)

## 2018-10-04 ENCOUNTER — COMMUNICATION - HEALTHEAST (OUTPATIENT)
Dept: INTERNAL MEDICINE | Facility: CLINIC | Age: 83
End: 2018-10-04

## 2018-10-24 ENCOUNTER — OFFICE VISIT - HEALTHEAST (OUTPATIENT)
Dept: OCCUPATIONAL THERAPY | Facility: REHABILITATION | Age: 83
End: 2018-10-24

## 2018-10-24 DIAGNOSIS — R41.3 MEMORY DIFFICULTY: ICD-10-CM

## 2018-10-24 DIAGNOSIS — Z78.9 DECREASED ACTIVITIES OF DAILY LIVING (ADL): ICD-10-CM

## 2018-11-19 ENCOUNTER — COMMUNICATION - HEALTHEAST (OUTPATIENT)
Dept: SCHEDULING | Facility: CLINIC | Age: 83
End: 2018-11-19

## 2018-11-21 ENCOUNTER — OFFICE VISIT - HEALTHEAST (OUTPATIENT)
Dept: INTERNAL MEDICINE | Facility: CLINIC | Age: 83
End: 2018-11-21

## 2018-11-21 DIAGNOSIS — I10 HTN (HYPERTENSION): ICD-10-CM

## 2018-11-21 DIAGNOSIS — R60.0 BILATERAL LOWER EXTREMITY EDEMA: ICD-10-CM

## 2018-11-27 ENCOUNTER — AMBULATORY - HEALTHEAST (OUTPATIENT)
Dept: LAB | Facility: CLINIC | Age: 83
End: 2018-11-27

## 2018-11-27 ENCOUNTER — COMMUNICATION - HEALTHEAST (OUTPATIENT)
Dept: ANTICOAGULATION | Facility: CLINIC | Age: 83
End: 2018-11-27

## 2018-11-27 DIAGNOSIS — I48.20 CHRONIC ATRIAL FIBRILLATION (H): ICD-10-CM

## 2018-11-27 LAB — INR PPP: 1.8 (ref 0.9–1.1)

## 2018-12-05 ENCOUNTER — COMMUNICATION - HEALTHEAST (OUTPATIENT)
Dept: ANTICOAGULATION | Facility: CLINIC | Age: 83
End: 2018-12-05

## 2018-12-05 DIAGNOSIS — I48.20 CHRONIC ATRIAL FIBRILLATION (H): ICD-10-CM

## 2018-12-05 DIAGNOSIS — I63.9 CEREBRAL INFARCTION (H): ICD-10-CM

## 2018-12-10 ENCOUNTER — RECORDS - HEALTHEAST (OUTPATIENT)
Dept: ADMINISTRATIVE | Facility: OTHER | Age: 83
End: 2018-12-10

## 2018-12-11 ENCOUNTER — COMMUNICATION - HEALTHEAST (OUTPATIENT)
Dept: ANTICOAGULATION | Facility: CLINIC | Age: 83
End: 2018-12-11

## 2018-12-11 ENCOUNTER — OFFICE VISIT - HEALTHEAST (OUTPATIENT)
Dept: INTERNAL MEDICINE | Facility: CLINIC | Age: 83
End: 2018-12-11

## 2018-12-11 DIAGNOSIS — I10 ESSENTIAL HYPERTENSION: ICD-10-CM

## 2018-12-11 DIAGNOSIS — R60.0 BILATERAL LOWER EXTREMITY EDEMA: ICD-10-CM

## 2018-12-11 DIAGNOSIS — D69.9 BLEEDING DIATHESIS (H): ICD-10-CM

## 2018-12-11 DIAGNOSIS — I63.9 CEREBRAL INFARCTION, UNSPECIFIED MECHANISM (H): ICD-10-CM

## 2018-12-11 DIAGNOSIS — R41.3 MEMORY DIFFICULTIES: ICD-10-CM

## 2018-12-11 DIAGNOSIS — I27.20 MILD PULMONARY HYPERTENSION (H): ICD-10-CM

## 2018-12-11 DIAGNOSIS — C61 PROSTATE CANCER (H): ICD-10-CM

## 2018-12-11 DIAGNOSIS — I48.20 CHRONIC ATRIAL FIBRILLATION (H): ICD-10-CM

## 2018-12-11 LAB — INR PPP: 1.9 (ref 0.9–1.1)

## 2018-12-26 ENCOUNTER — COMMUNICATION - HEALTHEAST (OUTPATIENT)
Dept: ANTICOAGULATION | Facility: CLINIC | Age: 83
End: 2018-12-26

## 2018-12-26 ENCOUNTER — AMBULATORY - HEALTHEAST (OUTPATIENT)
Dept: LAB | Facility: CLINIC | Age: 83
End: 2018-12-26

## 2018-12-26 DIAGNOSIS — I63.9 CEREBRAL INFARCTION, UNSPECIFIED MECHANISM (H): ICD-10-CM

## 2018-12-26 DIAGNOSIS — I48.20 CHRONIC ATRIAL FIBRILLATION (H): ICD-10-CM

## 2018-12-26 LAB — INR PPP: 2.5 (ref 0.9–1.1)

## 2019-01-09 ENCOUNTER — COMMUNICATION - HEALTHEAST (OUTPATIENT)
Dept: ANTICOAGULATION | Facility: CLINIC | Age: 84
End: 2019-01-09

## 2019-01-09 ENCOUNTER — AMBULATORY - HEALTHEAST (OUTPATIENT)
Dept: LAB | Facility: CLINIC | Age: 84
End: 2019-01-09

## 2019-01-09 DIAGNOSIS — I48.20 CHRONIC ATRIAL FIBRILLATION (H): ICD-10-CM

## 2019-01-09 DIAGNOSIS — I63.9 CEREBRAL INFARCTION, UNSPECIFIED MECHANISM (H): ICD-10-CM

## 2019-01-09 LAB — INR PPP: 2.3 (ref 0.9–1.1)

## 2019-01-29 ENCOUNTER — COMMUNICATION - HEALTHEAST (OUTPATIENT)
Dept: INTERNAL MEDICINE | Facility: CLINIC | Age: 84
End: 2019-01-29

## 2019-02-01 ENCOUNTER — COMMUNICATION - HEALTHEAST (OUTPATIENT)
Dept: INTERNAL MEDICINE | Facility: CLINIC | Age: 84
End: 2019-02-01

## 2019-02-01 ENCOUNTER — OFFICE VISIT - HEALTHEAST (OUTPATIENT)
Dept: INTERNAL MEDICINE | Facility: CLINIC | Age: 84
End: 2019-02-01

## 2019-02-01 ENCOUNTER — COMMUNICATION - HEALTHEAST (OUTPATIENT)
Dept: ANTICOAGULATION | Facility: CLINIC | Age: 84
End: 2019-02-01

## 2019-02-01 DIAGNOSIS — I63.9 CEREBRAL INFARCTION, UNSPECIFIED MECHANISM (H): ICD-10-CM

## 2019-02-01 DIAGNOSIS — I48.20 CHRONIC ATRIAL FIBRILLATION (H): ICD-10-CM

## 2019-02-01 DIAGNOSIS — I27.20 MILD PULMONARY HYPERTENSION (H): ICD-10-CM

## 2019-02-01 DIAGNOSIS — C61 PROSTATE CANCER (H): ICD-10-CM

## 2019-02-01 DIAGNOSIS — D69.9 BLEEDING DIATHESIS (H): ICD-10-CM

## 2019-02-01 DIAGNOSIS — R06.00 DYSPNEA, UNSPECIFIED TYPE: ICD-10-CM

## 2019-02-01 DIAGNOSIS — R60.0 BILATERAL LOWER EXTREMITY EDEMA: ICD-10-CM

## 2019-02-01 DIAGNOSIS — I10 ESSENTIAL HYPERTENSION: ICD-10-CM

## 2019-02-01 LAB
ALBUMIN SERPL-MCNC: 4 G/DL (ref 3.5–5)
ALP SERPL-CCNC: 151 U/L (ref 45–120)
ALT SERPL W P-5'-P-CCNC: 19 U/L (ref 0–45)
ANION GAP SERPL CALCULATED.3IONS-SCNC: 9 MMOL/L (ref 5–18)
AST SERPL W P-5'-P-CCNC: 30 U/L (ref 0–40)
BILIRUB SERPL-MCNC: 1.3 MG/DL (ref 0–1)
BNP SERPL-MCNC: 1032 PG/ML (ref 0–93)
BUN SERPL-MCNC: 21 MG/DL (ref 8–28)
CALCIUM SERPL-MCNC: 10.5 MG/DL (ref 8.5–10.5)
CHLORIDE BLD-SCNC: 102 MMOL/L (ref 98–107)
CO2 SERPL-SCNC: 25 MMOL/L (ref 22–31)
CREAT SERPL-MCNC: 1.21 MG/DL (ref 0.7–1.3)
ERYTHROCYTE [DISTWIDTH] IN BLOOD BY AUTOMATED COUNT: 11.5 % (ref 11–14.5)
GFR SERPL CREATININE-BSD FRML MDRD: 56 ML/MIN/1.73M2
GLUCOSE BLD-MCNC: 94 MG/DL (ref 70–125)
HCT VFR BLD AUTO: 38.9 % (ref 40–54)
HGB BLD-MCNC: 13.1 G/DL (ref 14–18)
INR PPP: 2.7 (ref 0.9–1.1)
MCH RBC QN AUTO: 31.9 PG (ref 27–34)
MCHC RBC AUTO-ENTMCNC: 33.6 G/DL (ref 32–36)
MCV RBC AUTO: 95 FL (ref 80–100)
PLATELET # BLD AUTO: 359 THOU/UL (ref 140–440)
PMV BLD AUTO: 8.5 FL (ref 7–10)
POTASSIUM BLD-SCNC: 4.8 MMOL/L (ref 3.5–5)
PROT SERPL-MCNC: 7.4 G/DL (ref 6–8)
RBC # BLD AUTO: 4.1 MILL/UL (ref 4.4–6.2)
SODIUM SERPL-SCNC: 136 MMOL/L (ref 136–145)
WBC: 7.5 THOU/UL (ref 4–11)

## 2019-02-27 ENCOUNTER — COMMUNICATION - HEALTHEAST (OUTPATIENT)
Dept: ANTICOAGULATION | Facility: CLINIC | Age: 84
End: 2019-02-27

## 2019-03-01 ENCOUNTER — RECORDS - HEALTHEAST (OUTPATIENT)
Dept: LAB | Facility: CLINIC | Age: 84
End: 2019-03-01

## 2019-03-01 ENCOUNTER — COMMUNICATION - HEALTHEAST (OUTPATIENT)
Dept: ANTICOAGULATION | Facility: CLINIC | Age: 84
End: 2019-03-01

## 2019-03-01 LAB — INR PPP: 2.46 (ref 0.9–1.1)

## 2019-03-04 ENCOUNTER — COMMUNICATION - HEALTHEAST (OUTPATIENT)
Dept: INTERNAL MEDICINE | Facility: CLINIC | Age: 84
End: 2019-03-04

## 2019-03-06 ENCOUNTER — OFFICE VISIT - HEALTHEAST (OUTPATIENT)
Dept: GERIATRICS | Facility: CLINIC | Age: 84
End: 2019-03-06

## 2019-03-06 DIAGNOSIS — I10 ESSENTIAL HYPERTENSION: ICD-10-CM

## 2019-03-06 DIAGNOSIS — I27.20 MILD PULMONARY HYPERTENSION (H): ICD-10-CM

## 2019-03-06 DIAGNOSIS — G89.29 CHRONIC MIDLINE LOW BACK PAIN WITHOUT SCIATICA: ICD-10-CM

## 2019-03-06 DIAGNOSIS — R60.9 DEPENDENT EDEMA: ICD-10-CM

## 2019-03-06 DIAGNOSIS — C61 PROSTATE CANCER (H): ICD-10-CM

## 2019-03-06 DIAGNOSIS — E53.8 VITAMIN B12 DEFICIENCY: ICD-10-CM

## 2019-03-06 DIAGNOSIS — H44.819: ICD-10-CM

## 2019-03-06 DIAGNOSIS — Z95.0 PACEMAKER: ICD-10-CM

## 2019-03-06 DIAGNOSIS — M48.00 MULTILEVEL FORAMINAL STENOSIS: ICD-10-CM

## 2019-03-06 DIAGNOSIS — K59.04 CHRONIC IDIOPATHIC CONSTIPATION: ICD-10-CM

## 2019-03-06 DIAGNOSIS — I48.20 CHRONIC ATRIAL FIBRILLATION (H): ICD-10-CM

## 2019-03-06 DIAGNOSIS — M54.50 CHRONIC MIDLINE LOW BACK PAIN WITHOUT SCIATICA: ICD-10-CM

## 2019-03-06 DIAGNOSIS — H35.30 MACULAR DEGENERATION OF BOTH EYES, UNSPECIFIED TYPE: ICD-10-CM

## 2019-03-06 DIAGNOSIS — I63.9 CEREBRAL INFARCTION, UNSPECIFIED MECHANISM (H): ICD-10-CM

## 2019-03-10 RX ORDER — ACETAMINOPHEN 500 MG
1000 TABLET ORAL 3 TIMES DAILY PRN
Status: SHIPPED | COMMUNITY
Start: 2019-03-10

## 2019-03-11 ENCOUNTER — COMMUNICATION - HEALTHEAST (OUTPATIENT)
Dept: GERIATRICS | Facility: CLINIC | Age: 84
End: 2019-03-11

## 2019-03-11 RX ORDER — POLYETHYLENE GLYCOL 3350 17 G/17G
17 POWDER, FOR SOLUTION ORAL DAILY PRN
Status: SHIPPED | COMMUNITY
Start: 2019-03-11

## 2019-03-15 ENCOUNTER — AMBULATORY - HEALTHEAST (OUTPATIENT)
Dept: OTHER | Facility: CLINIC | Age: 84
End: 2019-03-15

## 2019-03-20 ENCOUNTER — OFFICE VISIT - HEALTHEAST (OUTPATIENT)
Dept: GERIATRICS | Facility: CLINIC | Age: 84
End: 2019-03-20

## 2019-03-20 DIAGNOSIS — K59.04 CHRONIC IDIOPATHIC CONSTIPATION: ICD-10-CM

## 2019-03-20 DIAGNOSIS — H35.30 MACULAR DEGENERATION OF BOTH EYES, UNSPECIFIED TYPE: ICD-10-CM

## 2019-03-20 DIAGNOSIS — G89.29 CHRONIC MIDLINE LOW BACK PAIN WITHOUT SCIATICA: ICD-10-CM

## 2019-03-20 DIAGNOSIS — I48.20 CHRONIC ATRIAL FIBRILLATION (H): ICD-10-CM

## 2019-03-20 DIAGNOSIS — R60.9 DEPENDENT EDEMA: ICD-10-CM

## 2019-03-20 DIAGNOSIS — C61 PROSTATE CANCER (H): ICD-10-CM

## 2019-03-20 DIAGNOSIS — M48.00 MULTILEVEL FORAMINAL STENOSIS: ICD-10-CM

## 2019-03-20 DIAGNOSIS — M54.50 CHRONIC MIDLINE LOW BACK PAIN WITHOUT SCIATICA: ICD-10-CM

## 2019-03-20 DIAGNOSIS — Z95.0 PACEMAKER: ICD-10-CM

## 2019-03-20 DIAGNOSIS — I10 ESSENTIAL HYPERTENSION: ICD-10-CM

## 2019-03-20 DIAGNOSIS — R41.3 MEMORY DIFFICULTIES: ICD-10-CM

## 2019-03-27 ENCOUNTER — RECORDS - HEALTHEAST (OUTPATIENT)
Dept: ADMINISTRATIVE | Facility: OTHER | Age: 84
End: 2019-03-27

## 2019-03-29 ENCOUNTER — RECORDS - HEALTHEAST (OUTPATIENT)
Dept: LAB | Facility: CLINIC | Age: 84
End: 2019-03-29

## 2019-03-29 ENCOUNTER — COMMUNICATION - HEALTHEAST (OUTPATIENT)
Dept: ANTICOAGULATION | Facility: CLINIC | Age: 84
End: 2019-03-29

## 2019-03-29 LAB — INR PPP: 4.59 (ref 0.9–1.1)

## 2019-04-04 ENCOUNTER — RECORDS - HEALTHEAST (OUTPATIENT)
Dept: LAB | Facility: CLINIC | Age: 84
End: 2019-04-04

## 2019-04-05 ENCOUNTER — AMBULATORY - HEALTHEAST (OUTPATIENT)
Dept: CARDIOLOGY | Facility: CLINIC | Age: 84
End: 2019-04-05

## 2019-04-05 ENCOUNTER — COMMUNICATION - HEALTHEAST (OUTPATIENT)
Dept: ANTICOAGULATION | Facility: CLINIC | Age: 84
End: 2019-04-05

## 2019-04-05 DIAGNOSIS — Z95.0 CARDIAC PACEMAKER IN SITU: ICD-10-CM

## 2019-04-05 LAB
HCC DEVICE COMMENTS: NORMAL
HCC DEVICE IMPLANTING PROVIDER: NORMAL
HCC DEVICE MANUFACTURE: NORMAL
HCC DEVICE MODEL: NORMAL
HCC DEVICE SERIAL NUMBER: NORMAL
HCC DEVICE TYPE: NORMAL
INR PPP: 3.68 (ref 0.9–1.1)

## 2019-04-11 ENCOUNTER — RECORDS - HEALTHEAST (OUTPATIENT)
Dept: LAB | Facility: CLINIC | Age: 84
End: 2019-04-11

## 2019-04-12 ENCOUNTER — COMMUNICATION - HEALTHEAST (OUTPATIENT)
Dept: ANTICOAGULATION | Facility: CLINIC | Age: 84
End: 2019-04-12

## 2019-04-12 LAB — INR PPP: 2.48 (ref 0.9–1.1)

## 2019-04-18 ENCOUNTER — RECORDS - HEALTHEAST (OUTPATIENT)
Dept: LAB | Facility: CLINIC | Age: 84
End: 2019-04-18

## 2019-04-19 ENCOUNTER — COMMUNICATION - HEALTHEAST (OUTPATIENT)
Dept: ANTICOAGULATION | Facility: CLINIC | Age: 84
End: 2019-04-19

## 2019-04-19 LAB — INR PPP: 2.15 (ref 0.9–1.1)

## 2019-04-25 ENCOUNTER — RECORDS - HEALTHEAST (OUTPATIENT)
Dept: LAB | Facility: CLINIC | Age: 84
End: 2019-04-25

## 2019-04-26 ENCOUNTER — COMMUNICATION - HEALTHEAST (OUTPATIENT)
Dept: ANTICOAGULATION | Facility: CLINIC | Age: 84
End: 2019-04-26

## 2019-04-26 LAB — INR PPP: 1.84 (ref 0.9–1.1)

## 2019-05-02 ENCOUNTER — RECORDS - HEALTHEAST (OUTPATIENT)
Dept: LAB | Facility: CLINIC | Age: 84
End: 2019-05-02

## 2019-05-03 ENCOUNTER — COMMUNICATION - HEALTHEAST (OUTPATIENT)
Dept: ANTICOAGULATION | Facility: CLINIC | Age: 84
End: 2019-05-03

## 2019-05-03 LAB — INR PPP: 1.84 (ref 0.9–1.1)

## 2019-05-08 ENCOUNTER — OFFICE VISIT - HEALTHEAST (OUTPATIENT)
Dept: GERIATRICS | Facility: CLINIC | Age: 84
End: 2019-05-08

## 2019-05-08 DIAGNOSIS — M48.00 MULTILEVEL FORAMINAL STENOSIS: ICD-10-CM

## 2019-05-08 DIAGNOSIS — G89.29 CHRONIC MIDLINE LOW BACK PAIN WITHOUT SCIATICA: ICD-10-CM

## 2019-05-08 DIAGNOSIS — I63.9 CEREBRAL INFARCTION, UNSPECIFIED MECHANISM (H): ICD-10-CM

## 2019-05-08 DIAGNOSIS — I48.20 CHRONIC ATRIAL FIBRILLATION (H): ICD-10-CM

## 2019-05-08 DIAGNOSIS — H35.30 MACULAR DEGENERATION OF BOTH EYES, UNSPECIFIED TYPE: ICD-10-CM

## 2019-05-08 DIAGNOSIS — I10 ESSENTIAL HYPERTENSION: ICD-10-CM

## 2019-05-08 DIAGNOSIS — M54.50 CHRONIC MIDLINE LOW BACK PAIN WITHOUT SCIATICA: ICD-10-CM

## 2019-05-09 ENCOUNTER — RECORDS - HEALTHEAST (OUTPATIENT)
Dept: LAB | Facility: CLINIC | Age: 84
End: 2019-05-09

## 2019-05-10 ENCOUNTER — COMMUNICATION - HEALTHEAST (OUTPATIENT)
Dept: ANTICOAGULATION | Facility: CLINIC | Age: 84
End: 2019-05-10

## 2019-05-10 LAB — INR PPP: 2.09 (ref 0.9–1.1)

## 2019-05-16 ENCOUNTER — RECORDS - HEALTHEAST (OUTPATIENT)
Dept: LAB | Facility: CLINIC | Age: 84
End: 2019-05-16

## 2019-05-17 ENCOUNTER — COMMUNICATION - HEALTHEAST (OUTPATIENT)
Dept: ANTICOAGULATION | Facility: CLINIC | Age: 84
End: 2019-05-17

## 2019-05-17 LAB — INR PPP: 2.03 (ref 0.9–1.1)

## 2019-05-21 ENCOUNTER — COMMUNICATION - HEALTHEAST (OUTPATIENT)
Dept: GERIATRICS | Facility: CLINIC | Age: 84
End: 2019-05-21

## 2019-05-28 ENCOUNTER — RECORDS - HEALTHEAST (OUTPATIENT)
Dept: LAB | Facility: CLINIC | Age: 84
End: 2019-05-28

## 2019-05-28 LAB
ALBUMIN SERPL-MCNC: 3.8 G/DL (ref 3.5–5)
ALP SERPL-CCNC: 151 U/L (ref 45–120)
ALT SERPL W P-5'-P-CCNC: 16 U/L (ref 0–45)
ANION GAP SERPL CALCULATED.3IONS-SCNC: 8 MMOL/L (ref 5–18)
AST SERPL W P-5'-P-CCNC: 28 U/L (ref 0–40)
BASOPHILS # BLD AUTO: 0 THOU/UL (ref 0–0.2)
BASOPHILS NFR BLD AUTO: 0 % (ref 0–2)
BILIRUB SERPL-MCNC: 0.9 MG/DL (ref 0–1)
BUN SERPL-MCNC: 30 MG/DL (ref 8–28)
CALCIUM SERPL-MCNC: 10.5 MG/DL (ref 8.5–10.5)
CHLORIDE BLD-SCNC: 106 MMOL/L (ref 98–107)
CO2 SERPL-SCNC: 24 MMOL/L (ref 22–31)
CREAT SERPL-MCNC: 1.28 MG/DL (ref 0.7–1.3)
EOSINOPHIL # BLD AUTO: 0.1 THOU/UL (ref 0–0.4)
EOSINOPHIL NFR BLD AUTO: 1 % (ref 0–6)
ERYTHROCYTE [DISTWIDTH] IN BLOOD BY AUTOMATED COUNT: 14.8 % (ref 11–14.5)
GFR SERPL CREATININE-BSD FRML MDRD: 52 ML/MIN/1.73M2
GLUCOSE BLD-MCNC: 60 MG/DL (ref 70–125)
HCT VFR BLD AUTO: 35 % (ref 40–54)
HGB BLD-MCNC: 11 G/DL (ref 14–18)
LYMPHOCYTES # BLD AUTO: 1.1 THOU/UL (ref 0.8–4.4)
LYMPHOCYTES NFR BLD AUTO: 13 % (ref 20–40)
MCH RBC QN AUTO: 32.1 PG (ref 27–34)
MCHC RBC AUTO-ENTMCNC: 31.4 G/DL (ref 32–36)
MCV RBC AUTO: 102 FL (ref 80–100)
MONOCYTES # BLD AUTO: 0.7 THOU/UL (ref 0–0.9)
MONOCYTES NFR BLD AUTO: 8 % (ref 2–10)
NEUTROPHILS # BLD AUTO: 6.8 THOU/UL (ref 2–7.7)
NEUTROPHILS NFR BLD AUTO: 77 % (ref 50–70)
PLATELET # BLD AUTO: 344 THOU/UL (ref 140–440)
PMV BLD AUTO: 11 FL (ref 8.5–12.5)
POTASSIUM BLD-SCNC: 4.3 MMOL/L (ref 3.5–5)
PROT SERPL-MCNC: 7.5 G/DL (ref 6–8)
RBC # BLD AUTO: 3.43 MILL/UL (ref 4.4–6.2)
SODIUM SERPL-SCNC: 138 MMOL/L (ref 136–145)
WBC: 8.9 THOU/UL (ref 4–11)

## 2019-05-29 ENCOUNTER — OFFICE VISIT - HEALTHEAST (OUTPATIENT)
Dept: GERIATRICS | Facility: CLINIC | Age: 84
End: 2019-05-29

## 2019-05-29 DIAGNOSIS — I63.9 CEREBRAL INFARCTION, UNSPECIFIED MECHANISM (H): ICD-10-CM

## 2019-05-29 DIAGNOSIS — M54.50 CHRONIC MIDLINE LOW BACK PAIN WITHOUT SCIATICA: ICD-10-CM

## 2019-05-29 DIAGNOSIS — I48.20 CHRONIC ATRIAL FIBRILLATION (H): ICD-10-CM

## 2019-05-29 DIAGNOSIS — I10 ESSENTIAL HYPERTENSION: ICD-10-CM
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
## 2019-05-29 DIAGNOSIS — G89.29 CHRONIC MIDLINE LOW BACK PAIN WITHOUT SCIATICA: ICD-10-CM

## 2019-05-29 LAB
ALBUMIN SERPL-MCNC: 3.7 G/DL (ref 3.5–5)
ALP SERPL-CCNC: 151 U/L (ref 45–120)
ALT SERPL W P-5'-P-CCNC: 13 U/L (ref 0–45)
ANION GAP SERPL CALCULATED.3IONS-SCNC: 9 MMOL/L (ref 5–18)
AST SERPL W P-5'-P-CCNC: 22 U/L (ref 0–40)
BASOPHILS # BLD AUTO: 0 THOU/UL (ref 0–0.2)
BASOPHILS NFR BLD AUTO: 0 % (ref 0–2)
BILIRUB SERPL-MCNC: 1 MG/DL (ref 0–1)
BUN SERPL-MCNC: 27 MG/DL (ref 8–28)
CALCIUM SERPL-MCNC: 10.3 MG/DL (ref 8.5–10.5)
CHLORIDE BLD-SCNC: 105 MMOL/L (ref 98–107)
CO2 SERPL-SCNC: 24 MMOL/L (ref 22–31)
CREAT SERPL-MCNC: 1.15 MG/DL (ref 0.7–1.3)
EOSINOPHIL # BLD AUTO: 0.2 THOU/UL (ref 0–0.4)
EOSINOPHIL NFR BLD AUTO: 3 % (ref 0–6)
ERYTHROCYTE [DISTWIDTH] IN BLOOD BY AUTOMATED COUNT: 14.7 % (ref 11–14.5)
GFR SERPL CREATININE-BSD FRML MDRD: 59 ML/MIN/1.73M2
GLUCOSE BLD-MCNC: 77 MG/DL (ref 70–125)
HCT VFR BLD AUTO: 35.3 % (ref 40–54)
HGB BLD-MCNC: 11.2 G/DL (ref 14–18)
LYMPHOCYTES # BLD AUTO: 1.9 THOU/UL (ref 0.8–4.4)
LYMPHOCYTES NFR BLD AUTO: 23 % (ref 20–40)
MCH RBC QN AUTO: 31.7 PG (ref 27–34)
MCHC RBC AUTO-ENTMCNC: 31.7 G/DL (ref 32–36)
MCV RBC AUTO: 100 FL (ref 80–100)
MONOCYTES # BLD AUTO: 0.7 THOU/UL (ref 0–0.9)
MONOCYTES NFR BLD AUTO: 8 % (ref 2–10)
NEUTROPHILS # BLD AUTO: 5.7 THOU/UL (ref 2–7.7)
NEUTROPHILS NFR BLD AUTO: 67 % (ref 50–70)
PLATELET # BLD AUTO: 355 THOU/UL (ref 140–440)
PMV BLD AUTO: 10.9 FL (ref 8.5–12.5)
POTASSIUM BLD-SCNC: 4.3 MMOL/L (ref 3.5–5)
PROT SERPL-MCNC: 7.4 G/DL (ref 6–8)
RBC # BLD AUTO: 3.53 MILL/UL (ref 4.4–6.2)
SODIUM SERPL-SCNC: 138 MMOL/L (ref 136–145)
WBC: 8.6 THOU/UL (ref 4–11)

## 2019-05-30 ENCOUNTER — RECORDS - HEALTHEAST (OUTPATIENT)
Dept: LAB | Facility: CLINIC | Age: 84
End: 2019-05-30

## 2019-05-31 ENCOUNTER — COMMUNICATION - HEALTHEAST (OUTPATIENT)
Dept: ANTICOAGULATION | Facility: CLINIC | Age: 84
End: 2019-05-31

## 2019-05-31 LAB — INR PPP: 1.94 (ref 0.9–1.1)

## 2019-06-13 ENCOUNTER — RECORDS - HEALTHEAST (OUTPATIENT)
Dept: LAB | Facility: CLINIC | Age: 84
End: 2019-06-13

## 2019-06-14 ENCOUNTER — COMMUNICATION - HEALTHEAST (OUTPATIENT)
Dept: ANTICOAGULATION | Facility: CLINIC | Age: 84
End: 2019-06-14

## 2019-06-14 LAB — INR PPP: 2.54 (ref 0.9–1.1)

## 2019-06-27 ENCOUNTER — RECORDS - HEALTHEAST (OUTPATIENT)
Dept: LAB | Facility: CLINIC | Age: 84
End: 2019-06-27

## 2019-06-28 ENCOUNTER — COMMUNICATION - HEALTHEAST (OUTPATIENT)
Dept: ANTICOAGULATION | Facility: CLINIC | Age: 84
End: 2019-06-28

## 2019-06-28 LAB — INR PPP: 2.31 (ref 0.9–1.1)

## 2019-07-09 ENCOUNTER — AMBULATORY - HEALTHEAST (OUTPATIENT)
Dept: CARDIOLOGY | Facility: CLINIC | Age: 84
End: 2019-07-09

## 2019-07-09 DIAGNOSIS — Z95.0 CARDIAC PACEMAKER IN SITU: ICD-10-CM

## 2019-07-25 ENCOUNTER — RECORDS - HEALTHEAST (OUTPATIENT)
Dept: LAB | Facility: CLINIC | Age: 84
End: 2019-07-25

## 2019-07-26 ENCOUNTER — COMMUNICATION - HEALTHEAST (OUTPATIENT)
Dept: ANTICOAGULATION | Facility: CLINIC | Age: 84
End: 2019-07-26

## 2019-07-26 LAB — INR PPP: 2.58 (ref 0.9–1.1)

## 2019-07-29 ENCOUNTER — AMBULATORY - HEALTHEAST (OUTPATIENT)
Dept: CARDIOLOGY | Facility: CLINIC | Age: 84
End: 2019-07-29

## 2019-07-29 DIAGNOSIS — Z95.0 CARDIAC PACEMAKER IN SITU: ICD-10-CM

## 2019-08-22 ENCOUNTER — RECORDS - HEALTHEAST (OUTPATIENT)
Dept: LAB | Facility: CLINIC | Age: 84
End: 2019-08-22

## 2019-08-23 ENCOUNTER — COMMUNICATION - HEALTHEAST (OUTPATIENT)
Dept: ANTICOAGULATION | Facility: CLINIC | Age: 84
End: 2019-08-23

## 2019-08-23 LAB — INR PPP: 3.04 (ref 0.9–1.1)

## 2019-09-04 ENCOUNTER — OFFICE VISIT - HEALTHEAST (OUTPATIENT)
Dept: GERIATRICS | Facility: CLINIC | Age: 84
End: 2019-09-04

## 2019-09-04 DIAGNOSIS — Z95.0 PACEMAKER: ICD-10-CM

## 2019-09-04 DIAGNOSIS — M54.50 CHRONIC MIDLINE LOW BACK PAIN WITHOUT SCIATICA: ICD-10-CM

## 2019-09-04 DIAGNOSIS — M48.00 MULTILEVEL FORAMINAL STENOSIS: ICD-10-CM

## 2019-09-04 DIAGNOSIS — I63.9 CEREBRAL INFARCTION, UNSPECIFIED MECHANISM (H): ICD-10-CM

## 2019-09-04 DIAGNOSIS — I48.20 CHRONIC ATRIAL FIBRILLATION (H): ICD-10-CM

## 2019-09-04 DIAGNOSIS — I10 ESSENTIAL HYPERTENSION: ICD-10-CM

## 2019-09-04 DIAGNOSIS — F01.50 VASCULAR DEMENTIA WITHOUT BEHAVIORAL DISTURBANCE (H): ICD-10-CM

## 2019-09-04 DIAGNOSIS — H35.30 MACULAR DEGENERATION OF BOTH EYES, UNSPECIFIED TYPE: ICD-10-CM

## 2019-09-04 DIAGNOSIS — G89.29 CHRONIC MIDLINE LOW BACK PAIN WITHOUT SCIATICA: ICD-10-CM

## 2019-09-06 ENCOUNTER — RECORDS - HEALTHEAST (OUTPATIENT)
Dept: LAB | Facility: CLINIC | Age: 84
End: 2019-09-06

## 2019-09-06 ENCOUNTER — COMMUNICATION - HEALTHEAST (OUTPATIENT)
Dept: ANTICOAGULATION | Facility: CLINIC | Age: 84
End: 2019-09-06

## 2019-09-06 LAB — INR PPP: 2.71 (ref 0.9–1.1)

## 2019-09-19 ENCOUNTER — RECORDS - HEALTHEAST (OUTPATIENT)
Dept: LAB | Facility: CLINIC | Age: 84
End: 2019-09-19

## 2019-09-20 ENCOUNTER — COMMUNICATION - HEALTHEAST (OUTPATIENT)
Dept: ANTICOAGULATION | Facility: CLINIC | Age: 84
End: 2019-09-20

## 2019-09-20 LAB — INR PPP: 2.78 (ref 0.9–1.1)

## 2019-10-17 ENCOUNTER — RECORDS - HEALTHEAST (OUTPATIENT)
Dept: LAB | Facility: CLINIC | Age: 84
End: 2019-10-17

## 2019-10-18 ENCOUNTER — COMMUNICATION - HEALTHEAST (OUTPATIENT)
Dept: ANTICOAGULATION | Facility: CLINIC | Age: 84
End: 2019-10-18

## 2019-10-18 LAB — INR PPP: 2.64 (ref 0.9–1.1)

## 2019-11-05 ENCOUNTER — AMBULATORY - HEALTHEAST (OUTPATIENT)
Dept: CARDIOLOGY | Facility: CLINIC | Age: 84
End: 2019-11-05

## 2019-11-05 DIAGNOSIS — I48.91 ATRIAL FIBRILLATION, UNSPECIFIED TYPE (H): ICD-10-CM

## 2019-11-05 DIAGNOSIS — Z95.0 CARDIAC PACEMAKER IN SITU: ICD-10-CM

## 2019-11-15 ENCOUNTER — RECORDS - HEALTHEAST (OUTPATIENT)
Dept: LAB | Facility: CLINIC | Age: 84
End: 2019-11-15

## 2019-11-15 ENCOUNTER — COMMUNICATION - HEALTHEAST (OUTPATIENT)
Dept: ANTICOAGULATION | Facility: CLINIC | Age: 84
End: 2019-11-15

## 2019-11-15 LAB — INR PPP: 2.79 (ref 0.9–1.1)

## 2019-12-04 ENCOUNTER — OFFICE VISIT - HEALTHEAST (OUTPATIENT)
Dept: GERIATRICS | Facility: CLINIC | Age: 84
End: 2019-12-04

## 2019-12-04 DIAGNOSIS — I27.20 MILD PULMONARY HYPERTENSION (H): ICD-10-CM

## 2019-12-04 DIAGNOSIS — I63.9 CEREBRAL INFARCTION, UNSPECIFIED MECHANISM (H): ICD-10-CM

## 2019-12-04 DIAGNOSIS — I10 ESSENTIAL HYPERTENSION: ICD-10-CM

## 2019-12-04 DIAGNOSIS — K59.04 CHRONIC IDIOPATHIC CONSTIPATION: ICD-10-CM

## 2019-12-04 DIAGNOSIS — F01.50 VASCULAR DEMENTIA WITHOUT BEHAVIORAL DISTURBANCE (H): ICD-10-CM

## 2019-12-04 DIAGNOSIS — C61 PROSTATE CANCER (H): ICD-10-CM

## 2019-12-04 DIAGNOSIS — M48.00 MULTILEVEL FORAMINAL STENOSIS: ICD-10-CM

## 2019-12-04 DIAGNOSIS — I48.91 ATRIAL FIBRILLATION, UNSPECIFIED TYPE (H): ICD-10-CM

## 2019-12-09 ENCOUNTER — RECORDS - HEALTHEAST (OUTPATIENT)
Dept: ADMINISTRATIVE | Facility: OTHER | Age: 84
End: 2019-12-09

## 2019-12-12 ENCOUNTER — RECORDS - HEALTHEAST (OUTPATIENT)
Dept: LAB | Facility: CLINIC | Age: 84
End: 2019-12-12

## 2019-12-13 ENCOUNTER — COMMUNICATION - HEALTHEAST (OUTPATIENT)
Dept: ANTICOAGULATION | Facility: CLINIC | Age: 84
End: 2019-12-13

## 2019-12-13 LAB — INR PPP: 2.75 (ref 0.9–1.1)

## 2019-12-16 ENCOUNTER — RECORDS - HEALTHEAST (OUTPATIENT)
Dept: ADMINISTRATIVE | Facility: OTHER | Age: 84
End: 2019-12-16

## 2020-01-01 ENCOUNTER — COMMUNICATION - HEALTHEAST (OUTPATIENT)
Dept: ANTICOAGULATION | Facility: CLINIC | Age: 85
End: 2020-01-01

## 2020-01-01 ENCOUNTER — RECORDS - HEALTHEAST (OUTPATIENT)
Dept: LAB | Facility: CLINIC | Age: 85
End: 2020-01-01

## 2020-01-01 ENCOUNTER — OFFICE VISIT - HEALTHEAST (OUTPATIENT)
Dept: GERIATRICS | Facility: CLINIC | Age: 85
End: 2020-01-01

## 2020-01-01 ENCOUNTER — COMMUNICATION - HEALTHEAST (OUTPATIENT)
Dept: GERIATRICS | Facility: CLINIC | Age: 85
End: 2020-01-01

## 2020-01-01 ENCOUNTER — AMBULATORY - HEALTHEAST (OUTPATIENT)
Dept: CARDIOLOGY | Facility: CLINIC | Age: 85
End: 2020-01-01

## 2020-01-01 ENCOUNTER — AMBULATORY - HEALTHEAST (OUTPATIENT)
Dept: ANTICOAGULATION | Facility: CLINIC | Age: 85
End: 2020-01-01

## 2020-01-01 ENCOUNTER — COMMUNICATION - HEALTHEAST (OUTPATIENT)
Dept: ADMINISTRATIVE | Facility: CLINIC | Age: 85
End: 2020-01-01

## 2020-01-01 ENCOUNTER — AMBULATORY - HEALTHEAST (OUTPATIENT)
Dept: GERIATRICS | Facility: CLINIC | Age: 85
End: 2020-01-01

## 2020-01-01 DIAGNOSIS — F01.50 VASCULAR DEMENTIA WITHOUT BEHAVIORAL DISTURBANCE (H): ICD-10-CM

## 2020-01-01 DIAGNOSIS — I48.91 ATRIAL FIBRILLATION, UNSPECIFIED TYPE (H): ICD-10-CM

## 2020-01-01 DIAGNOSIS — K59.04 CHRONIC IDIOPATHIC CONSTIPATION: ICD-10-CM

## 2020-01-01 DIAGNOSIS — R41.3 MEMORY DIFFICULTIES: ICD-10-CM

## 2020-01-01 DIAGNOSIS — I63.9 CEREBRAL INFARCTION, UNSPECIFIED MECHANISM (H): ICD-10-CM

## 2020-01-01 DIAGNOSIS — M48.00 MULTILEVEL FORAMINAL STENOSIS: ICD-10-CM

## 2020-01-01 DIAGNOSIS — I50.32 CHRONIC DIASTOLIC CONGESTIVE HEART FAILURE (H): ICD-10-CM

## 2020-01-01 DIAGNOSIS — M54.50 CHRONIC MIDLINE LOW BACK PAIN WITHOUT SCIATICA: ICD-10-CM

## 2020-01-01 DIAGNOSIS — Z95.0 PACEMAKER: ICD-10-CM

## 2020-01-01 DIAGNOSIS — G89.29 CHRONIC MIDLINE LOW BACK PAIN WITHOUT SCIATICA: ICD-10-CM

## 2020-01-01 DIAGNOSIS — Z79.01 ANTICOAGULATION GOAL OF INR 2 TO 3: ICD-10-CM

## 2020-01-01 DIAGNOSIS — Z95.0 CARDIAC PACEMAKER IN SITU: ICD-10-CM

## 2020-01-01 DIAGNOSIS — E03.8 OTHER SPECIFIED HYPOTHYROIDISM: ICD-10-CM

## 2020-01-01 DIAGNOSIS — F43.21 GRIEF: ICD-10-CM

## 2020-01-01 DIAGNOSIS — I10 ESSENTIAL HYPERTENSION: ICD-10-CM

## 2020-01-01 DIAGNOSIS — R60.9 DEPENDENT EDEMA: ICD-10-CM

## 2020-01-01 DIAGNOSIS — N18.30 STAGE 3 CHRONIC KIDNEY DISEASE, UNSPECIFIED WHETHER STAGE 3A OR 3B CKD (H): ICD-10-CM

## 2020-01-01 DIAGNOSIS — R41.89 COGNITIVE DECLINE: ICD-10-CM

## 2020-01-01 DIAGNOSIS — I48.19 PERSISTENT ATRIAL FIBRILLATION (H): ICD-10-CM

## 2020-01-01 DIAGNOSIS — Z51.81 ANTICOAGULATION GOAL OF INR 2 TO 3: ICD-10-CM

## 2020-01-01 DIAGNOSIS — F34.1 DYSTHYMIA: ICD-10-CM

## 2020-01-01 DIAGNOSIS — I50.31 ACUTE DIASTOLIC CONGESTIVE HEART FAILURE (H): ICD-10-CM

## 2020-01-01 DIAGNOSIS — E87.6 HYPOKALEMIA: ICD-10-CM

## 2020-01-01 DIAGNOSIS — C61 PROSTATE CANCER (H): ICD-10-CM

## 2020-01-01 DIAGNOSIS — K59.00 CONSTIPATION: ICD-10-CM

## 2020-01-01 DIAGNOSIS — H01.00A BLEPHARITIS OF UPPER AND LOWER EYELIDS OF BOTH EYES, UNSPECIFIED TYPE: ICD-10-CM

## 2020-01-01 DIAGNOSIS — H01.00B BLEPHARITIS OF UPPER AND LOWER EYELIDS OF BOTH EYES, UNSPECIFIED TYPE: ICD-10-CM

## 2020-01-01 DIAGNOSIS — I27.20 MILD PULMONARY HYPERTENSION (H): ICD-10-CM

## 2020-01-01 LAB
ALBUMIN SERPL-MCNC: 3.4 G/DL (ref 3.5–5)
ALP SERPL-CCNC: 140 U/L (ref 45–120)
ALT SERPL W P-5'-P-CCNC: <9 U/L (ref 0–45)
ANION GAP SERPL CALCULATED.3IONS-SCNC: 10 MMOL/L (ref 5–18)
ANION GAP SERPL CALCULATED.3IONS-SCNC: 10 MMOL/L (ref 5–18)
ANION GAP SERPL CALCULATED.3IONS-SCNC: 7 MMOL/L (ref 5–18)
ANION GAP SERPL CALCULATED.3IONS-SCNC: 9 MMOL/L (ref 5–18)
AST SERPL W P-5'-P-CCNC: 15 U/L (ref 0–40)
BASOPHILS # BLD AUTO: 0 THOU/UL (ref 0–0.2)
BASOPHILS NFR BLD AUTO: 0 % (ref 0–2)
BILIRUB SERPL-MCNC: 0.8 MG/DL (ref 0–1)
BUN SERPL-MCNC: 27 MG/DL (ref 8–28)
BUN SERPL-MCNC: 29 MG/DL (ref 8–28)
BUN SERPL-MCNC: 31 MG/DL (ref 8–28)
BUN SERPL-MCNC: 32 MG/DL (ref 8–28)
BUN SERPL-MCNC: 35 MG/DL (ref 8–28)
BUN SERPL-MCNC: 42 MG/DL (ref 8–28)
CALCIUM SERPL-MCNC: 9.3 MG/DL (ref 8.5–10.5)
CALCIUM SERPL-MCNC: 9.4 MG/DL (ref 8.5–10.5)
CALCIUM SERPL-MCNC: 9.6 MG/DL (ref 8.5–10.5)
CALCIUM SERPL-MCNC: 9.6 MG/DL (ref 8.5–10.5)
CHLORIDE BLD-SCNC: 103 MMOL/L (ref 98–107)
CHLORIDE BLD-SCNC: 105 MMOL/L (ref 98–107)
CHLORIDE BLD-SCNC: 107 MMOL/L (ref 98–107)
CHLORIDE BLD-SCNC: 107 MMOL/L (ref 98–107)
CHLORIDE BLD-SCNC: 108 MMOL/L (ref 98–107)
CHLORIDE BLD-SCNC: 109 MMOL/L (ref 98–107)
CO2 SERPL-SCNC: 24 MMOL/L (ref 22–31)
CO2 SERPL-SCNC: 25 MMOL/L (ref 22–31)
CO2 SERPL-SCNC: 25 MMOL/L (ref 22–31)
CO2 SERPL-SCNC: 26 MMOL/L (ref 22–31)
CO2 SERPL-SCNC: 26 MMOL/L (ref 22–31)
CO2 SERPL-SCNC: 27 MMOL/L (ref 22–31)
CREAT SERPL-MCNC: 1.23 MG/DL (ref 0.7–1.3)
CREAT SERPL-MCNC: 1.32 MG/DL (ref 0.7–1.3)
CREAT SERPL-MCNC: 1.32 MG/DL (ref 0.7–1.3)
CREAT SERPL-MCNC: 1.34 MG/DL (ref 0.7–1.3)
CREAT SERPL-MCNC: 1.34 MG/DL (ref 0.7–1.3)
CREAT SERPL-MCNC: 1.43 MG/DL (ref 0.7–1.3)
EOSINOPHIL # BLD AUTO: 0.3 THOU/UL (ref 0–0.4)
EOSINOPHIL NFR BLD AUTO: 3 % (ref 0–6)
ERYTHROCYTE [DISTWIDTH] IN BLOOD BY AUTOMATED COUNT: 14.2 % (ref 11–14.5)
GFR SERPL CREATININE-BSD FRML MDRD: 46 ML/MIN/1.73M2
GFR SERPL CREATININE-BSD FRML MDRD: 50 ML/MIN/1.73M2
GFR SERPL CREATININE-BSD FRML MDRD: 50 ML/MIN/1.73M2
GFR SERPL CREATININE-BSD FRML MDRD: 51 ML/MIN/1.73M2
GFR SERPL CREATININE-BSD FRML MDRD: 51 ML/MIN/1.73M2
GFR SERPL CREATININE-BSD FRML MDRD: 55 ML/MIN/1.73M2
GLUCOSE BLD-MCNC: 51 MG/DL (ref 70–125)
GLUCOSE BLD-MCNC: 68 MG/DL (ref 70–125)
GLUCOSE BLD-MCNC: 69 MG/DL (ref 70–125)
GLUCOSE BLD-MCNC: 74 MG/DL (ref 70–125)
GLUCOSE BLD-MCNC: 78 MG/DL (ref 70–125)
GLUCOSE BLD-MCNC: 78 MG/DL (ref 70–125)
HBA1C MFR BLD: 5.8 %
HCC DEVICE COMMENTS: NORMAL
HCC DEVICE IMPLANTING PROVIDER: NORMAL
HCC DEVICE MANUFACTURE: NORMAL
HCC DEVICE MODEL: NORMAL
HCC DEVICE SERIAL NUMBER: NORMAL
HCC DEVICE TYPE: NORMAL
HCT VFR BLD AUTO: 36.1 % (ref 40–54)
HGB BLD-MCNC: 11.2 G/DL (ref 14–18)
IMM GRANULOCYTES # BLD: 0.1 THOU/UL
IMM GRANULOCYTES NFR BLD: 1 %
INR PPP: 1.61 (ref 0.9–1.1)
INR PPP: 1.75 (ref 0.9–1.1)
INR PPP: 2.04 (ref 0.9–1.1)
INR PPP: 2.05 (ref 0.9–1.1)
INR PPP: 2.2 (ref 0.9–1.1)
INR PPP: 2.33 (ref 0.9–1.1)
INR PPP: 2.35 (ref 0.9–1.1)
INR PPP: 2.38 (ref 0.9–1.1)
INR PPP: 2.43 (ref 0.9–1.1)
INR PPP: 2.57 (ref 0.9–1.1)
INR PPP: 2.61 (ref 0.9–1.1)
INR PPP: 2.61 (ref 0.9–1.1)
INR PPP: 2.64 (ref 0.9–1.1)
INR PPP: 2.83 (ref 0.9–1.1)
INR PPP: 3.14 (ref 0.9–1.1)
INR PPP: 3.38 (ref 0.9–1.1)
INR PPP: 4.12 (ref 0.9–1.1)
LYMPHOCYTES # BLD AUTO: 1.3 THOU/UL (ref 0.8–4.4)
LYMPHOCYTES NFR BLD AUTO: 14 % (ref 20–40)
MCH RBC QN AUTO: 31.2 PG (ref 27–34)
MCHC RBC AUTO-ENTMCNC: 31 G/DL (ref 32–36)
MCV RBC AUTO: 101 FL (ref 80–100)
MONOCYTES # BLD AUTO: 0.7 THOU/UL (ref 0–0.9)
MONOCYTES NFR BLD AUTO: 8 % (ref 2–10)
NEUTROPHILS # BLD AUTO: 6.8 THOU/UL (ref 2–7.7)
NEUTROPHILS NFR BLD AUTO: 73 % (ref 50–70)
PLATELET # BLD AUTO: 341 THOU/UL (ref 140–440)
PMV BLD AUTO: 11.6 FL (ref 8.5–12.5)
POTASSIUM BLD-SCNC: 3.7 MMOL/L (ref 3.5–5)
POTASSIUM BLD-SCNC: 3.9 MMOL/L (ref 3.5–5)
POTASSIUM BLD-SCNC: 4 MMOL/L (ref 3.5–5)
POTASSIUM BLD-SCNC: 4.1 MMOL/L (ref 3.5–5)
POTASSIUM BLD-SCNC: 4.1 MMOL/L (ref 3.5–5)
POTASSIUM BLD-SCNC: 4.2 MMOL/L (ref 3.5–5)
PROT SERPL-MCNC: 7.1 G/DL (ref 6–8)
RBC # BLD AUTO: 3.59 MILL/UL (ref 4.4–6.2)
SODIUM SERPL-SCNC: 138 MMOL/L (ref 136–145)
SODIUM SERPL-SCNC: 139 MMOL/L (ref 136–145)
SODIUM SERPL-SCNC: 140 MMOL/L (ref 136–145)
SODIUM SERPL-SCNC: 142 MMOL/L (ref 136–145)
SODIUM SERPL-SCNC: 143 MMOL/L (ref 136–145)
SODIUM SERPL-SCNC: 144 MMOL/L (ref 136–145)
TSH SERPL DL<=0.005 MIU/L-ACNC: 4.46 UIU/ML (ref 0.3–5)
TSH SERPL DL<=0.005 MIU/L-ACNC: 5.45 UIU/ML (ref 0.3–5)
TSH SERPL DL<=0.005 MIU/L-ACNC: 6.4 UIU/ML (ref 0.3–5)
WBC: 9.2 THOU/UL (ref 4–11)

## 2020-01-01 RX ORDER — POTASSIUM CHLORIDE 750 MG/1
TABLET, EXTENDED RELEASE ORAL
Qty: 30 TABLET | Refills: 10 | Status: SHIPPED | OUTPATIENT
Start: 2020-01-01

## 2020-01-01 RX ORDER — LEVOTHYROXINE SODIUM 25 UG/1
25 TABLET ORAL DAILY
Qty: 30 TABLET | Refills: 10 | Status: SHIPPED | OUTPATIENT
Start: 2020-01-01

## 2020-01-01 RX ORDER — MEMANTINE HYDROCHLORIDE 7 MG/1
CAPSULE, EXTENDED RELEASE ORAL
Qty: 30 CAPSULE | Refills: 10 | Status: SHIPPED | OUTPATIENT
Start: 2020-01-01

## 2020-01-01 RX ORDER — FUROSEMIDE 20 MG
TABLET ORAL
Qty: 30 TABLET | Refills: 10 | Status: SHIPPED | OUTPATIENT
Start: 2020-01-01

## 2020-01-01 RX ORDER — FUROSEMIDE 40 MG
TABLET ORAL
Qty: 30 TABLET | Refills: 12 | Status: SHIPPED | OUTPATIENT
Start: 2020-01-01

## 2020-01-01 RX ORDER — DOCUSATE SODIUM 50MG AND SENNOSIDES 8.6MG 8.6; 5 MG/1; MG/1
TABLET, FILM COATED ORAL
Qty: 60 TABLET | Refills: 11 | Status: SHIPPED | OUTPATIENT
Start: 2020-01-01

## 2020-01-01 RX ORDER — POTASSIUM CHLORIDE 1500 MG/1
TABLET, EXTENDED RELEASE ORAL
Qty: 30 TABLET | Refills: 12 | Status: SHIPPED | OUTPATIENT
Start: 2020-01-01

## 2020-01-01 RX ORDER — METOPROLOL SUCCINATE 50 MG/1
TABLET, EXTENDED RELEASE ORAL
Qty: 45 TABLET | Refills: 11 | Status: SHIPPED | OUTPATIENT
Start: 2020-01-01

## 2020-01-09 ENCOUNTER — COMMUNICATION - HEALTHEAST (OUTPATIENT)
Dept: GERIATRICS | Facility: CLINIC | Age: 85
End: 2020-01-09

## 2020-01-09 DIAGNOSIS — I10 ESSENTIAL HYPERTENSION: ICD-10-CM

## 2020-01-09 DIAGNOSIS — I48.19 PERSISTENT ATRIAL FIBRILLATION (H): ICD-10-CM

## 2020-01-10 ENCOUNTER — COMMUNICATION - HEALTHEAST (OUTPATIENT)
Dept: ANTICOAGULATION | Facility: CLINIC | Age: 85
End: 2020-01-10

## 2020-01-10 ENCOUNTER — RECORDS - HEALTHEAST (OUTPATIENT)
Dept: LAB | Facility: CLINIC | Age: 85
End: 2020-01-10

## 2020-01-10 LAB — INR PPP: 2.61 (ref 0.9–1.1)

## 2021-01-01 ENCOUNTER — OFFICE VISIT - HEALTHEAST (OUTPATIENT)
Dept: GERIATRICS | Facility: CLINIC | Age: 86
End: 2021-01-01

## 2021-01-01 ENCOUNTER — COMMUNICATION - HEALTHEAST (OUTPATIENT)
Dept: ANTICOAGULATION | Facility: CLINIC | Age: 86
End: 2021-01-01

## 2021-01-01 ENCOUNTER — AMBULATORY - HEALTHEAST (OUTPATIENT)
Dept: GERIATRICS | Facility: CLINIC | Age: 86
End: 2021-01-01

## 2021-01-01 ENCOUNTER — RECORDS - HEALTHEAST (OUTPATIENT)
Dept: LAB | Facility: CLINIC | Age: 86
End: 2021-01-01

## 2021-01-01 DIAGNOSIS — G89.29 CHRONIC MIDLINE LOW BACK PAIN WITHOUT SCIATICA: ICD-10-CM

## 2021-01-01 DIAGNOSIS — I48.91 ATRIAL FIBRILLATION, UNSPECIFIED TYPE (H): ICD-10-CM

## 2021-01-01 DIAGNOSIS — I50.32 CHRONIC DIASTOLIC CONGESTIVE HEART FAILURE (H): ICD-10-CM

## 2021-01-01 DIAGNOSIS — M54.50 CHRONIC MIDLINE LOW BACK PAIN WITHOUT SCIATICA: ICD-10-CM

## 2021-01-01 DIAGNOSIS — U07.1 2019 NOVEL CORONAVIRUS DISEASE (COVID-19): ICD-10-CM

## 2021-01-01 DIAGNOSIS — F01.50 VASCULAR DEMENTIA WITHOUT BEHAVIORAL DISTURBANCE (H): ICD-10-CM

## 2021-01-01 DIAGNOSIS — Z95.0 CARDIAC PACEMAKER IN SITU: ICD-10-CM

## 2021-01-01 DIAGNOSIS — I10 ESSENTIAL HYPERTENSION: ICD-10-CM

## 2021-01-01 DIAGNOSIS — N18.30 STAGE 3 CHRONIC KIDNEY DISEASE, UNSPECIFIED WHETHER STAGE 3A OR 3B CKD (H): ICD-10-CM

## 2021-01-01 LAB — INR PPP: 2.79 (ref 0.9–1.1)

## 2021-02-04 ENCOUNTER — COMMUNICATION - HEALTHEAST (OUTPATIENT)
Dept: GERIATRIC MEDICINE | Facility: CLINIC | Age: 86
End: 2021-02-04

## 2021-05-25 ENCOUNTER — RECORDS - HEALTHEAST (OUTPATIENT)
Dept: ADMINISTRATIVE | Facility: CLINIC | Age: 86
End: 2021-05-25

## 2021-05-26 ENCOUNTER — RECORDS - HEALTHEAST (OUTPATIENT)
Dept: ADMINISTRATIVE | Facility: CLINIC | Age: 86
End: 2021-05-26

## 2021-05-26 VITALS
RESPIRATION RATE: 20 BRPM | DIASTOLIC BLOOD PRESSURE: 70 MMHG | TEMPERATURE: 97.5 F | OXYGEN SATURATION: 99 % | HEART RATE: 70 BPM | SYSTOLIC BLOOD PRESSURE: 175 MMHG

## 2021-05-26 VITALS
TEMPERATURE: 96.8 F | HEART RATE: 60 BPM | DIASTOLIC BLOOD PRESSURE: 66 MMHG | SYSTOLIC BLOOD PRESSURE: 128 MMHG | OXYGEN SATURATION: 94 % | RESPIRATION RATE: 18 BRPM

## 2021-05-27 ENCOUNTER — RECORDS - HEALTHEAST (OUTPATIENT)
Dept: ADMINISTRATIVE | Facility: CLINIC | Age: 86
End: 2021-05-27

## 2021-05-27 VITALS — OXYGEN SATURATION: 99 % | TEMPERATURE: 97.4 F

## 2021-05-27 VITALS
SYSTOLIC BLOOD PRESSURE: 161 MMHG | TEMPERATURE: 98.1 F | OXYGEN SATURATION: 97 % | DIASTOLIC BLOOD PRESSURE: 83 MMHG | HEART RATE: 61 BPM | RESPIRATION RATE: 14 BRPM

## 2021-05-27 NOTE — TELEPHONE ENCOUNTER
ANTICOAGULATION  MANAGEMENT    Assessment     Today's INR result of 2.48 is Therapeutic (goal INR of 2.0-3.0)        Previous INR was Supratherapeutic    Warfarin given as previously instructed    No new health/diet changes affecting INR    No new medication/supplements affecting INR    Continues to tolerate warfarin with no reported s/s of bleeding or thromboembolism       Plan:     Warfarin Dosing Orders:  Continue current warfarin dose 1.25 mg daily.    Next INR: 1 week.    Telephone orders given to nurseManny.  Orders read back correctly.     Boo Quesada RN    Subjective/Objective:      Raymond Zheng, a 89 y.o. male is on warfarin. Facility nurse reports for [place]:    Other anticoagulants: No    Medication changes: No     Missed warfarin doses since last INR: No     Abnormal bleeding since last INR: No    New symptoms, injury or illness: No     Upcoming surgery, procedure or cardioversion: No    Recent INR Results:    Lab Results   Component Value Date    INR 2.48 (H) 04/12/2019    INR 3.68 (H) 04/05/2019    INR 4.59 (H) 03/29/2019       Anticoagulation Episode Summary     Current INR goal:   2.0-3.0   TTR:   85.2 % (4.3 y)   Next INR check:   4/19/2019   INR from last check:   2.48 (4/12/2019)   Weekly max warfarin dose:      Target end date:      INR check location:      Preferred lab:      Send INR reminders to:   ANTICOAGULATION POOL E ([facility])    Indications    A-fib (H) (Resolved) [I48.91]           Comments:            Anticoagulation Care Providers     Provider Role Specialty Phone number    Delmi Regan NP Responsible Nurse Practitioner [phone]

## 2021-05-27 NOTE — TELEPHONE ENCOUNTER
ANTICOAGULATION  MANAGEMENT    Assessment     Today's INR result of 4.59 is Supratherapeutic (goal INR of 2.0-3.0)        Previous INR was Therapeutic    Warfarin given as previously instructed    Acute health changes, low back pain, may be affecting INR    Interaction between extra strength tylenol and warfarin may be affecting INR    No interaction expected between namenda and warfarin    Continues to tolerate warfarin with no reported s/s of bleeding or thromboembolism       Plan:     Warfarin Dosing Orders:  Hold warfarin today only then change warfarin dose to 2.5 mg daily on Mon, Wed, Fri; and 1.25 mg daily rest of week  (16.7 % change)    Next INR: 1 week    Telephone orders given to nurseSylvia.  Orders read back correctly.     Vanita Ashton RN    Subjective/Objective:      Raymond Zheng, a 89 y.o. male is on warfarin. Facility nurse reports for Raymond:    Other anticoagulants: No    Medication changes: Yes: Namenda, no interaction per micromedex  Also taking more tylenol lately which can increase INR     Missed warfarin doses since last INR: No     Abnormal bleeding since last INR: No    New symptoms, injury or illness: Yes: low back pain flare     Upcoming surgery, procedure or cardioversion: No    Recent INR Results:    Lab Results   Component Value Date    INR 4.59 (H) 03/29/2019    INR 2.46 (H) 03/01/2019    INR 2.70 (H) 02/01/2019       Anticoagulation Episode Summary     Current INR goal:   2.0-3.0   TTR:   85.8 % (4.2 y)   Next INR check:   4/5/2019   INR from last check:   4.59! (3/29/2019)   Weekly max warfarin dose:      Target end date:      INR check location:      Preferred lab:      Send INR reminders to:   ANTICOAGULATION POOL B (MPW,HUG,STW,RVL,OAK,RLN)    Indications    A-fib (H) (Resolved) [I48.91]           Comments:            Anticoagulation Care Providers     Provider Role Specialty Phone number    Ciro Meyer MD Referring Family Medicine [phone]

## 2021-05-28 ENCOUNTER — RECORDS - HEALTHEAST (OUTPATIENT)
Dept: ADMINISTRATIVE | Facility: CLINIC | Age: 86
End: 2021-05-28

## 2021-05-28 NOTE — TELEPHONE ENCOUNTER
ANTICOAGULATION  MANAGEMENT    Assessment     Today's INR result of 1.84 is Subtherapeutic (goal INR of 2.0-3.0)        Previous INR was Therapeutic    Warfarin given as previously instructed    No new health/diet changes affecting INR    No new medication/supplements affecting INR    Continues to tolerate warfarin with no reported s/s of bleeding or thromboembolism       Plan:     Warfarin Dosing Orders:  Change warfarin dose to 2.5 mg daily on Fri; and 1.25 mg daily rest of week  (14 % change)    Next INR: 1 week.     Telephone orders given to nurseAlicja.  Orders read back correctly.     Boo Quesada RN    Subjective/Objective:      Raymond Zheng, a 89 y.o. male is on warfarin. Facility nurse reports for Raymond:    Other anticoagulants: No    Medication changes: No     Missed warfarin doses since last INR: No     Abnormal bleeding since last INR: No    New symptoms, injury or illness: No     Upcoming surgery, procedure or cardioversion: No    Recent INR Results:    Lab Results   Component Value Date    INR 1.84 (H) 04/26/2019    INR 2.15 (H) 04/19/2019    INR 2.48 (H) 04/12/2019       Anticoagulation Episode Summary     Current INR goal:   2.0-3.0   TTR:   85.1 % (4.3 y)   Next INR check:   5/3/2019   INR from last check:   1.84! (4/26/2019)   Weekly max warfarin dose:      Target end date:      INR check location:      Preferred lab:      Send INR reminders to:   ANTICOAGULATION POOL E ([facility])    Indications    A-fib (H) (Resolved) [I48.91]           Comments:            Anticoagulation Care Providers     Provider Role Specialty Phone number    Delmi Regan NP Responsible Nurse Practitioner [phone]

## 2021-05-28 NOTE — TELEPHONE ENCOUNTER
ANTICOAGULATION  MANAGEMENT    Assessment     Today's INR result of 2.03 is Therapeutic (goal INR of 2.0-3.0)        Previous INR was Therapeutic    Warfarin given as previously instructed    No new health/diet changes affecting INR    No new medication/supplements affecting INR    Continues to tolerate warfarin with no reported s/s of bleeding or thromboembolism       Plan:     Warfarin Dosing Orders:  Continue current warfarin dose 2.5 mg daily on Mon, Fri; and 1.25 mg daily rest of week  (0 % change)    Next INR: 2 weeks    Telephone orders given to nurseManny.  Orders read back correctly.     Vanita Ashton RN    Subjective/Objective:      Raymond Zheng, a 89 y.o. male is on warfarin. Facility nurse reports for Raymond:    Other anticoagulants: No    Medication changes: No     Missed warfarin doses since last INR: No     Abnormal bleeding since last INR: No    New symptoms, injury or illness: No     Upcoming surgery, procedure or cardioversion: No    Recent INR Results:    Lab Results   Component Value Date    INR 2.03 (H) 05/17/2019    INR 2.09 (H) 05/10/2019    INR 1.84 (H) 05/03/2019       Anticoagulation Episode Summary     Current INR goal:   2.0-3.0   TTR:   84.6 % (4.4 y)   Next INR check:   5/31/2019   INR from last check:   2.03 (5/17/2019)   Weekly max warfarin dose:      Target end date:      INR check location:      Preferred lab:      Send INR reminders to:   ANTICOAGULATION POOL E ([facility])    Indications    A-fib (H) (Resolved) [I48.91]           Comments:            Anticoagulation Care Providers     Provider Role Specialty Phone number    Delmi Regan NP Responsible Nurse Practitioner [phone]

## 2021-05-28 NOTE — TELEPHONE ENCOUNTER
ANTICOAGULATION  MANAGEMENT    Assessment     Today's INR result of 2.09 is Therapeutic (goal INR of 2.0-3.0)        Previous INR was Subtherapeutic    Warfarin given as previously instructed    No new health/diet changes affecting INR    No new medication/supplements affecting INR    Continues to tolerate warfarin with no reported s/s of bleeding or thromboembolism       Plan:     Warfarin Dosing Orders:  Continue current warfarin dose    2.5 mg every Mon, Fri; 1.25 mg all other days         Next INR: 1 week.    Telephone orders given to nurseManny.  Orders read back correctly.     Boo Quesada RN    Subjective/Objective:      Raymond Zheng, a 89 y.o. male is on warfarin. Facility nurse reports for Raymond:    Other anticoagulants: No    Medication changes: No     Missed warfarin doses since last INR: No     Abnormal bleeding since last INR: No    New symptoms, injury or illness: No     Upcoming surgery, procedure or cardioversion: No    Recent INR Results:    Lab Results   Component Value Date    INR 2.09 (H) 05/10/2019    INR 1.84 (H) 05/03/2019    INR 1.84 (H) 04/26/2019       Anticoagulation Episode Summary     Current INR goal:   2.0-3.0   TTR:   84.5 % (4.4 y)   Next INR check:   5/17/2019   INR from last check:   2.09 (5/10/2019)   Weekly max warfarin dose:      Target end date:      INR check location:      Preferred lab:      Send INR reminders to:   ANTICOAGULATION POOL E ([facility])    Indications    A-fib (H) (Resolved) [I48.91]           Comments:            Anticoagulation Care Providers     Provider Role Specialty Phone number    Delmi Regan NP Responsible Nurse Practitioner [phone]

## 2021-05-28 NOTE — PROGRESS NOTES
[facility]    DATE: 2019    NAME:  Raymond Zheng             :  1925  MRN: [id number]  CODE STATUS:  FULL CODE    VISIT TYPE: Problem Visit (back pain)     FACILITY:  [facility] [[id number]]       CHIEF COMPLAIN/REASON FOR VISIT:    Chief Complaint   Patient presents with     Problem Visit     back pain               HISTORY OF PRESENT ILLNESS: Raymond Zheng is a 89 y.o. male who is a resident of [facility]. He has PMH of A fib, CVA, prostate cancer, HTN, knee osteoarthritis, low back pain, lumbar stenosis, pacemaker, vit b12 deficiency, memory issues, mild pulmonary HTN, dependent edema, dyspnea.     Today Mr. Zheng is seen with his wife at side per her request for increased back pain and questioning whether gabapentin would be appropriate. They are about to head down to lunch so do not have long to visit. Sydni reports that Raymond has been complaining of increased back pain recently but she is not sure if it radiates to his legs or not. She just noticed he seems to complain more and she is on gabapentin which helps her back and thought maybe it would be appropriate for him. He reports that his pain does not radiate to legs but stays in low back. He denies numbness and tingling. He denies any issues with his bowels or bladder. He is not having dizziness or other concerns. His gait seems the same and he uses his walker to get around. His wife says he has completed PT but is doing acupuncture and seeing the chiropractor for his back. She is wondering about tylenol or something else. Discussed pain management options and do not recommend gabapentin as does not seem to be nerve related pain. Will try aspercreme two times a day and prn and tylenol three times a day. They will notify if his pain is not controlled to the nursing staff.      REVIEW OF SYSTEMS:  PROBLEMS AND REVIEW OF SYSTEMS:   Review of Systems  Today on ROS:   Currently, no  fever, chills, or rigors. Decreased vision and hearing. Denies any chest pain, headaches, palpitations, lightheadedness, dizziness. Appetite is good. Denies any GERD symptoms. Denies any difficulty with swallowing, nausea, or vomiting.  Denies any abdominal pain, diarrhea or constipation. Denies any urinary symptoms. No insomnia.  Positive for forgetfulness, short term memory issues, ambulates with walker, shortness of breath with extreme exertion, increased back pain      Allergies   Allergen Reactions     Ciprofloxacin      Erythromycin Base      Oxycodone-Acetaminophen      Sulfa (Sulfonamide Antibiotics)      Current Outpatient Medications   Medication Sig     trolamine salicylate (ASPERCREME) 10 % cream Apply 1 application topically 2 (two) times a day.     acetaminophen (TYLENOL) 500 MG tablet Take 1,000 mg by mouth 3 (three) times a day.            atropine 1 % ophthalmic solution 1 drop at bedtime.     cyanocobalamin (VITAMIN B-12) 1000 MCG tablet Take 500 mcg by mouth daily.            digoxin (LANOXIN) 125 mcg tablet TAKE ONE TABLET BY MOUTH DAILY     FOLIC ACID/MV,FE,MIN (CENTRUM ORAL) Take 1 tablet by mouth daily.      memantine (NAMENDA XR) 7 mg CSpX Take 7 mg by mouth at bedtime.     metoprolol succinate (TOPROL XL) 50 MG 24 hr tablet Take 1 tablet (50 mg total) by mouth daily.     nitroglycerin (NITROSTAT) 0.4 MG SL tablet Place 0.4 mg under the tongue as needed for chest pain.     polyethylene glycol (MIRALAX) 17 gram packet Take 17 g by mouth daily as needed.     prednisoLONE acetate (PRED-FORTE) 1 % ophthalmic suspension 1 drop every 3 (three) hours.     senna (SENOKOT) 8.6 mg tablet Take 2 tablets by mouth daily.            tamsulosin (FLOMAX) 0.4 mg Cp24 Take 1 capsule (0.4 mg total) by mouth daily.     warfarin (COUMADIN) 2.5 MG tablet Take 0.5-1 tablets (1.25-2.5 mg total) by mouth daily. 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 Followed by [facility].       Vitamin B12 deficiency      Vitiligo            PHYSICAL EXAMINATION  There were no vitals filed for this visit.    Today on physical exam:     GENERAL: Awake, Alert, oriented x3, not in any form of acute distress, answers questions appropriately, follows simple commands, conversant  HEENT: Head is normocephalic with normal hair distribution. No evidence of trauma. Ears: No acute purulent discharge. Eyes: Conjunctivae pink with no scleral jaundice. Nose: Normal mucosa and septum. NECK: Supple with no cervical or supraclavicular lymphadenopathy. Trachea is midline. Glasses, hearing aids  CHEST: No tenderness or deformity, no crepitus  LUNG: dim to auscultation with good chest expansion. There are no crackles or wheezes, normal AP diameter. Shortness of breath with extreme exertion  BACK: No kyphosis of the thoracic spine. Symmetric, no curvature, ROM normal, no CVA tenderness, no spinal tenderness; lumbar pain today, localized no radiation, no numb/tingling  CVS: irregularly irregular rhythm, systolic murmur,  2+ pulses symmetric in all extremities.  ABDOMEN: Rounded and soft, nontender to palpation, non distended, no masses, no organomegaly, good bowel sounds, no rebound or guarding, no peritoneal signs.   EXTREMITIES: No pedal edema, darin discoloration, left arm skin tear scabbed and healed  SKIN: Warm and dry,   NEUROLOGICAL: The patient is oriented to person, place and time. Forgetful at times, short term memory issues, no numb/tingling ble.            LABS:   Recent Results (from the past 168 hour(s))   INR   Result  Value Ref Range    INR 2.09 (H) 0.90 - 1.10     Results for orders placed or performed during the hospital encounter of 05/07/18   Basic Metabolic Panel   Result Value Ref Range    Sodium 134 (L) 136 - 145 mmol/L    Potassium 4.5 3.5 - 5.0 mmol/L    Chloride 99 98 - 107 mmol/L    CO2 23 22 - 31 mmol/L    Anion Gap, Calculation 12 5 - 18 mmol/L    Glucose 104 70 - 125 mg/dL    Calcium 10.0 8.5 - 10.5 mg/dL    BUN 18 8 - 28 mg/dL    Creatinine 1.12 0.70 - 1.30 mg/dL    GFR MDRD Af Amer >60 >60 mL/min/1.73m2    GFR MDRD Non Af Amer >60 >60 mL/min/1.73m2         Lab Results   Component Value Date    WBC 7.5 02/01/2019    HGB 13.1 (L) 02/01/2019    HCT 38.9 (L) 02/01/2019    MCV 95 02/01/2019     02/01/2019       Lab Results   Component Value Date    [id number] 796 10/02/2018     No results found for: HGBA1C  Lab Results   Component Value Date    INR 2.09 (H) 05/10/2019    INR 1.84 (H) 05/03/2019    INR 1.84 (H) 04/26/2019     No results found for: [id number]  Lab Results   Component Value Date    TSH 2.08 08/04/2015           ASSESSMENT/PLAN:    1. Atrial fibrillation: Rate controlled. No chest pain or palpitations. On digoxin, metoprolol, warfarin. Anticoagulation clinic now managing warfarin in house. .   2. HTN: SBP 140s. On metoprolol. Appears stable.   3. S/p pacemaker: Follows with cardiology, [facility]. Recently requested home monitor so not have to go in for as frequent of checks.   4. Pulmonary hypertension, Dyspnea: Only with extreme exertion. No chest pain or recent concerns. Follows with cardiology.   5. Dependent edema: No edema today. On torsemide.  weights-162lbs. Tries to elevate legs in recliner, does not feel gaurav hose are doable for him with back pain/stenosis.   6. H/o prostate cancer: No recent issues. On flomax. Radiation seeding in 2004. No recurrence. F/u with urology annually.   7. Lumbar stenosis, chronic back pain: Complaining more recently, tylenol prn. No radiation, no  numb/tingling. Will start tylenol three times a day and aspercreme two times a day and prn. Will notify if not improved. No bowel or bladder incontinence, no numb/tingling ble.   8. Cognitive impairment, short term memory issues:  on namenda 7mg xr at bedtime and monitor.   9. H/o CVA: No residual deficits but does have issues with memory which may or may not be related. Possibly vascular dementia component. CT head in may of 2018 showed increase in periventricular low attenuation when compared to 8/22/2009 suggesting progress of chronic microvascular ischemic changes. On warfarin. No benefit of statin given age and risk v benefit.   10. Macular degeneration, intraocular hemorrhage: Follows with Dr. Bill Figueroa. On atropine and prednisolone gtts. No injections as of yet.   11. Constipation: On colace prn and taking prune juice. senna daily prn.   12. Vitamin b 12 deficiency: On vitamin b 12 daily.     Labs recently checked in February 2019, b12 level in October 2018.      Electronically signed by: Delmi Regan NP

## 2021-05-28 NOTE — TELEPHONE ENCOUNTER
ANTICOAGULATION  MANAGEMENT    Assessment     Today's INR result of 1.84 is Subtherapeutic (goal INR of 2.0-3.0)        Previous INR was Subtherapeutic    Warfarin given as previously instructed    No new health/diet changes affecting INR    No new medication/supplements affecting INR    Continues to tolerate warfarin with no reported s/s of bleeding or thromboembolism       Plan:     Warfarin Dosing Orders:  Change warfarin dose to 2.5 mg daily on Mon, Fri; and 1.25 mg daily rest of week  (12.5 % change)    Next INR: 1 week.    Telephone orders given to nurseTomy.  Orders read back correctly.     Boo Quesada RN    Subjective/Objective:      Raymond Zheng, a 89 y.o. male is on warfarin. Facility nurse reports for Raymond:    Other anticoagulants: No    Medication changes: No     Missed warfarin doses since last INR: No     Abnormal bleeding since last INR: No    New symptoms, injury or illness: No     Upcoming surgery, procedure or cardioversion: No    Recent INR Results:    Lab Results   Component Value Date    INR 1.84 (H) 05/03/2019    INR 1.84 (H) 04/26/2019    INR 2.15 (H) 04/19/2019       Anticoagulation Episode Summary     Current INR goal:   2.0-3.0   TTR:   84.8 % (4.3 y)   Next INR check:   5/10/2019   INR from last check:   1.84! (5/3/2019)   Weekly max warfarin dose:      Target end date:      INR check location:      Preferred lab:      Send INR reminders to:   ANTICOAGULATION POOL E ([facility] FOR [facility])    Indications    A-fib (H) (Resolved) [I48.91]           Comments:            Anticoagulation Care Providers     Provider Role Specialty Phone number    Delmi Regan NP Responsible Nurse Practitioner [phone]

## 2021-05-29 ENCOUNTER — RECORDS - HEALTHEAST (OUTPATIENT)
Dept: ADMINISTRATIVE | Facility: CLINIC | Age: 86
End: 2021-05-29

## 2021-05-29 NOTE — PROGRESS NOTES
[facility]    DATE: 2019    NAME:  Raymond Zheng             :  1925  MRN: [id number]  CODE STATUS:  FULL CODE    VISIT TYPE: Problem Visit     FACILITY:  [facility] [[id number]]       CHIEF COMPLAIN/REASON FOR VISIT:    Chief Complaint   Patient presents with     Problem Visit               HISTORY OF PRESENT ILLNESS: Raymond Zheng is a 89 y.o. male who is a resident of [facility]. He has PMH of A fib, CVA, prostate cancer, HTN, knee osteoarthritis, low back pain, lumbar stenosis, pacemaker, vit b12 deficiency, memory issues, mild pulmonary HTN, dependent edema, dyspnea.     Today Mr. Zheng is seen for follow up on fall last week, skin tear to left arm, elevated blood pressure and low heart rate on vitals last week. Today he reports he has been doing fine and no further falls. His skin tear is scabbed and healing. He is not having any dizziness or headaches. He denies any visual changes, chest pain, chest pressure, palpitations, or shortness of breath with exertion. He had some swelling in his ankles but they are much better now. He sleeps in the recliner most nights because of his pain but last night his wife reports he was able to sleep in the bed all night. His pain in back seems improved since making those med adjustments few weeks ago. His appetite is good and no issues with bowels or stomach upset. He was having issues with constipation but now improved. He denies any pain in legs the pain just stays in his low back. He has not had any recent illnesses and no cough or allergy symptoms. He is not using the aspercreme as he is controlled on the tylenol. He would like to use it if needed though. His wife reports no other concerns and he is certainly not complaining about his back as much.     REVIEW OF SYSTEMS:  PROBLEMS AND REVIEW OF SYSTEMS:   Review of Systems  Today on ROS:   Currently, no fever, chills, or rigors. Decreased  vision and hearing. Denies any chest pain, headaches, palpitations, lightheadedness, dizziness. Appetite is good. Denies any GERD symptoms. Denies any difficulty with swallowing, nausea, or vomiting.  Denies any abdominal pain, diarrhea or constipation. Denies any urinary symptoms. No insomnia.  Positive for forgetfulness, short term memory issues, ambulates with walker, no shortness of breath, back pain controlled, fall last week, skin tear to left arm scabbed      Allergies   Allergen Reactions     Ciprofloxacin      Erythromycin Base      Oxycodone-Acetaminophen      Sulfa (Sulfonamide Antibiotics)      Current Outpatient Medications   Medication Sig     acetaminophen (TYLENOL) 500 MG tablet Take 1,000 mg by mouth 3 (three) times a day.            atropine 1 % ophthalmic solution 1 drop at bedtime.     cyanocobalamin (VITAMIN B-12) 1000 MCG tablet Take 500 mcg by mouth daily.            FOLIC ACID/MV,FE,MIN (CENTRUM ORAL) Take 1 tablet by mouth daily.      memantine (NAMENDA XR) 7 mg CSpX Take 7 mg by mouth at bedtime.     metoprolol succinate (TOPROL XL) 50 MG 24 hr tablet Take 1 tablet (50 mg total) by mouth daily.     nitroglycerin (NITROSTAT) 0.4 MG SL tablet Place 0.4 mg under the tongue as needed for chest pain.     polyethylene glycol (MIRALAX) 17 gram packet Take 17 g by mouth daily as needed.     prednisoLONE acetate (PRED-FORTE) 1 % ophthalmic suspension 1 drop every 3 (three) hours.     senna (SENOKOT) 8.6 mg tablet Take 2 tablets by mouth daily.            tamsulosin (FLOMAX) 0.4 mg Cp24 Take 1 capsule (0.4 mg total) by mouth daily.     trolamine salicylate (ASPERCREME) 10 % cream Apply 1 application topically every 6 (six) hours as needed.            warfarin (COUMADIN) 2.5 MG tablet Take 0.5-1 tablets (1.25-2.5 mg total) by mouth daily. 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 Followed by [facility].       Vitamin B12 deficiency      Vitiligo            PHYSICAL EXAMINATION  Vitals:    05/29/19 0700   BP: 159/89   Pulse: 82   Resp: 18   Temp: 97.7  F (36.5  C)   SpO2: 97%   Weight: 156 lb (70.8 kg)       Today on physical exam:     GENERAL: Awake, Alert, oriented x3, not in any form of acute distress, answers questions appropriately, follows simple commands, conversant  HEENT: Head is normocephalic with normal hair distribution. No evidence of trauma. Ears: No acute purulent discharge. Eyes: Conjunctivae pink with no scleral jaundice. Nose: Normal mucosa and septum. NECK: Supple with no cervical or supraclavicular lymphadenopathy. Trachea is midline. Glasses, hearing aids  CHEST: No tenderness or deformity, no crepitus  LUNG: dim to auscultation with good chest expansion. There are no crackles or wheezes, normal AP diameter. No sob  BACK: No kyphosis of the thoracic spine. Symmetric, no curvature, ROM normal, no CVA tenderness, no spinal tenderness; lumbar pain today, localized no radiation, no numb/tingling  CVS: irregularly irregular rhythm, systolic murmur,  2+ pulses symmetric in all extremities.  ABDOMEN: Rounded and soft, nontender to palpation, non distended, no masses, no organomegaly, good bowel sounds, no rebound or guarding, no peritoneal signs.   EXTREMITIES: No pedal edema, darin discoloration, left arm skin tear healed  SKIN: Warm and dry,   NEUROLOGICAL: The patient is oriented to person, place and time. Forgetful at times, short term memory issues, no numb/tingling ble.            LABS:   Recent Results (from the past 168 hour(s))   Comprehensive Metabolic  Panel   Result Value Ref Range    Sodium 138 136 - 145 mmol/L    Potassium 4.3 3.5 - 5.0 mmol/L    Chloride 106 98 - 107 mmol/L    CO2 24 22 - 31 mmol/L    Anion Gap, Calculation 8 5 - 18 mmol/L    Glucose 60 (L) 70 - 125 mg/dL    BUN 30 (H) 8 - 28 mg/dL    Creatinine 1.28 0.70 - 1.30 mg/dL    GFR MDRD Af Amer >60 >60 mL/min/1.73m2    GFR MDRD Non Af Amer 52 (L) >60 mL/min/1.73m2    Bilirubin, Total 0.9 0.0 - 1.0 mg/dL    Calcium 10.5 8.5 - 10.5 mg/dL    Protein, Total 7.5 6.0 - 8.0 g/dL    Albumin 3.8 3.5 - 5.0 g/dL    Alkaline Phosphatase 151 (H) 45 - 120 U/L    AST 28 0 - 40 U/L    ALT 16 0 - 45 U/L   HM1 (CBC with Diff)   Result Value Ref Range    WBC 8.9 4.0 - 11.0 thou/uL    RBC 3.43 (L) 4.40 - 6.20 mill/uL    Hemoglobin 11.0 (L) 14.0 - 18.0 g/dL    Hematocrit 35.0 (L) 40.0 - 54.0 %     (H) 80 - 100 fL    MCH 32.1 27.0 - 34.0 pg    MCHC 31.4 (L) 32.0 - 36.0 g/dL    RDW 14.8 (H) 11.0 - 14.5 %    Platelets 344 140 - 440 thou/uL    MPV 11.0 8.5 - 12.5 fL    Neutrophils % 77 (H) 50 - 70 %    Lymphocytes % 13 (L) 20 - 40 %    Monocytes % 8 2 - 10 %    Eosinophils % 1 0 - 6 %    Basophils % 0 0 - 2 %    Neutrophils Absolute 6.8 2.0 - 7.7 thou/uL    Lymphocytes Absolute 1.1 0.8 - 4.4 thou/uL    Monocytes Absolute 0.7 0.0 - 0.9 thou/uL    Eosinophils Absolute 0.1 0.0 - 0.4 thou/uL    Basophils Absolute 0.0 0.0 - 0.2 thou/uL   Comprehensive Metabolic Panel   Result Value Ref Range    Sodium 138 136 - 145 mmol/L    Potassium 4.3 3.5 - 5.0 mmol/L    Chloride 105 98 - 107 mmol/L    CO2 24 22 - 31 mmol/L    Anion Gap, Calculation 9 5 - 18 mmol/L    Glucose 77 70 - 125 mg/dL    BUN 27 8 - 28 mg/dL    Creatinine 1.15 0.70 - 1.30 mg/dL    GFR MDRD Af Amer >60 >60 mL/min/1.73m2    GFR MDRD Non Af Amer 59 (L) >60 mL/min/1.73m2    Bilirubin, Total 1.0 0.0 - 1.0 mg/dL    Calcium 10.3 8.5 - 10.5 mg/dL    Protein, Total 7.4 6.0 - 8.0 g/dL    Albumin 3.7 3.5 - 5.0 g/dL    Alkaline Phosphatase 151 (H) 45 - 120 U/L    AST 22 0  - 40 U/L    ALT 13 0 - 45 U/L   HM1 (CBC with Diff)   Result Value Ref Range    WBC 8.6 4.0 - 11.0 thou/uL    RBC 3.53 (L) 4.40 - 6.20 mill/uL    Hemoglobin 11.2 (L) 14.0 - 18.0 g/dL    Hematocrit 35.3 (L) 40.0 - 54.0 %     80 - 100 fL    MCH 31.7 27.0 - 34.0 pg    MCHC 31.7 (L) 32.0 - 36.0 g/dL    RDW 14.7 (H) 11.0 - 14.5 %    Platelets 355 140 - 440 thou/uL    MPV 10.9 8.5 - 12.5 fL    Neutrophils % 67 50 - 70 %    Lymphocytes % 23 20 - 40 %    Monocytes % 8 2 - 10 %    Eosinophils % 3 0 - 6 %    Basophils % 0 0 - 2 %    Neutrophils Absolute 5.7 2.0 - 7.7 thou/uL    Lymphocytes Absolute 1.9 0.8 - 4.4 thou/uL    Monocytes Absolute 0.7 0.0 - 0.9 thou/uL    Eosinophils Absolute 0.2 0.0 - 0.4 thou/uL    Basophils Absolute 0.0 0.0 - 0.2 thou/uL     Results for orders placed or performed during the hospital encounter of 05/07/18   Basic Metabolic Panel   Result Value Ref Range    Sodium 134 (L) 136 - 145 mmol/L    Potassium 4.5 3.5 - 5.0 mmol/L    Chloride 99 98 - 107 mmol/L    CO2 23 22 - 31 mmol/L    Anion Gap, Calculation 12 5 - 18 mmol/L    Glucose 104 70 - 125 mg/dL    Calcium 10.0 8.5 - 10.5 mg/dL    BUN 18 8 - 28 mg/dL    Creatinine 1.12 0.70 - 1.30 mg/dL    GFR MDRD Af Amer >60 >60 mL/min/1.73m2    GFR MDRD Non Af Amer >60 >60 mL/min/1.73m2         Lab Results   Component Value Date    WBC 8.6 05/29/2019    HGB 11.2 (L) 05/29/2019    HCT 35.3 (L) 05/29/2019     05/29/2019     05/29/2019       Lab Results   Component Value Date    [id number] 796 10/02/2018     No results found for: HGBA1C  Lab Results   Component Value Date    INR 2.03 (H) 05/17/2019    INR 2.09 (H) 05/10/2019    INR 1.84 (H) 05/03/2019     No results found for: [id number]  Lab Results   Component Value Date    TSH 2.08 08/04/2015           ASSESSMENT/PLAN:    1. Atrial fibrillation: Rate controlled. No chest pain or palpitations. On metoprolol, warfarin. Anticoagulation clinic now managing warfarin in house. INR 2.03 on  5/17 recheck 2 weeks. Bradycardic last week in low 50s, stopped digoxin and now better controlled in 80s. No palpitations or heart racing, no chest pain or pressure. No dizziness.   2. HTN: SBP 150s. On metoprolol. Appears stable. Last week hypertensive 160s, mildly improved today.   3. S/p pacemaker: Follows with cardiology, [facility]. 7/9 device check.   4. Pulmonary hypertension, Dyspnea: No recent shortness of breath. No chest pain or recent concerns. Follows with cardiology.   5. Dependent edema: No edema today.  [id number]. Tries to elevate legs in recliner, does not feel gaurav hose are doable for him with back pain/stenosis. Off torsemide, no edema today and no shortness of breath. Discussed weight loss and feels this must be error because he is eating well. Only 2 weights available.   6. H/o prostate cancer: No recent issues. On flomax. Radiation seeding in 2004. No recurrence. F/u with urology annually.   7. Lumbar stenosis, chronic back pain: No radiation, no numb/tingling. On tylenol three times a day and aspercreme two times a day and prn. No bowel or bladder incontinence, no numb/tingling ble. Improved control on tylenol did not use aspercreme. Will change to q6h prn only.   8. Cognitive impairment, short term memory issues:  on namenda 7mg xr at bedtime and monitor.   9. H/o CVA: No residual deficits but does have issues with memory which may or may not be related. Possibly vascular dementia component. CT head in may of 2018 showed increase in periventricular low attenuation when compared to 8/22/2009 suggesting progress of chronic microvascular ischemic changes. On warfarin. No benefit of statin given age and risk v benefit.   10. Macular degeneration, intraocular hemorrhage: Follows with Dr. Bill Figueroa. On atropine and prednisolone gtts. No injections as of yet.   11. Constipation: On colace prn and taking prune juice. senna daily prn.   12. Vitamin b 12 deficiency: On vitamin b 12  daily.   13. Fall: left arm skin tear healed. No further falls. Monitor.     [facility], hm1 on 5/29-stable, discussed results with patient and wife    Electronically signed by: Delmi Regan NP      Total floor/unit time spent 40 min with 25 min spent on counseling and coordination of care. Counseling regarding hypertensive management, bradycardia management, back pain management, fall, lab results, and need for further monitoring. Coordinated care with nursing for management of labs, back pain, fall, chf.

## 2021-05-29 NOTE — TELEPHONE ENCOUNTER
ANTICOAGULATION  MANAGEMENT    Assessment     Today's INR result of 2.54 is Therapeutic (goal INR of 2.0-3.0)        Previous INR was Subtherapeutic    Warfarin given as previously instructed    No new health/diet changes affecting INR    No new medication/supplements affecting INR    Continues to tolerate warfarin with no reported s/s of bleeding or thromboembolism       Plan:     Warfarin Dosing Orders:  Continue current warfarin dose    2.5 mg every Mon, Wed, Fri; 1.25 mg all other days         Next INR: 2 weeks.     Telephone orders given to nurseJorge.  Orders read back correctly.     Boo Quesada RN    Subjective/Objective:      Raymond Zheng, a 89 y.o. male is on warfarin. Facility nurse reports for Raymond:    Other anticoagulants: No    Medication changes: No     Missed warfarin doses since last INR: No     Abnormal bleeding since last INR: No    New symptoms, injury or illness: No     Upcoming surgery, procedure or cardioversion: No    Recent INR Results:    Lab Results   Component Value Date    INR 2.54 (H) 06/14/2019    INR 1.94 (H) 05/31/2019    INR 2.03 (H) 05/17/2019       Anticoagulation Episode Summary     Current INR goal:   2.0-3.0   TTR:   84.2 % (4.5 y)   Next INR check:   6/28/2019   INR from last check:   2.54 (6/14/2019)   Weekly max warfarin dose:      Target end date:      INR check location:      Preferred lab:      Send INR reminders to:    [facility] ([facility])    Indications    A-fib (H) (Resolved) [I48.91]           Comments:            Anticoagulation Care Providers     Provider Role Specialty Phone number    Delmi Regan NP Responsible Nurse Practitioner [phone]

## 2021-05-29 NOTE — TELEPHONE ENCOUNTER
[facility] Nurse Triage Telephone Note      Provider: ANABELA Contreras  Facility: [facility]    Facility Type: [facility]    Caller: Mamta  Call Back Number:  [phone]    Allergies: Ciprofloxacin; Erythromycin base; Oxycodone-acetaminophen; and Sulfa (sulfonamide antibiotics)    Reason for call: Nurse calling to report that patient had a fall yesterday.  He sustained a skin tear to his left upper arm that was steristripped.  The nurse at the [facility] was checking his VS today and noted a BP of 160/80 and HR was 50.  Notable meds:  Digoxin 125mcg Q AM, Metoprolol XR 50mg Q HS, Tamsulosin 0.4mg Q HS.  No weakness or dizziness noted.  Patient is also on Warfarin.  Last INR was 2.03 on 5/17/19.  He is to take 2.5mg on Mon and Fri and 1.25mg all other days, with a next INR due on 5/31/19.       Verbal Order/Direction given by Provider: DC Digoxin.  Put on the list of NP to see on 5/29/19.  Check VS on 5/28/19.  Check Heme 1 and CMP on 5/28/19.      Provider giving order: ANABELA Contreras    Verbal order given to: Mamta Beth RN

## 2021-05-30 ENCOUNTER — RECORDS - HEALTHEAST (OUTPATIENT)
Dept: ADMINISTRATIVE | Facility: CLINIC | Age: 86
End: 2021-05-30

## 2021-05-30 VITALS — WEIGHT: 161 LBS | HEIGHT: 70 IN | BODY MASS INDEX: 23.05 KG/M2

## 2021-05-30 VITALS — WEIGHT: 162 LBS | HEIGHT: 70 IN | BODY MASS INDEX: 23.19 KG/M2

## 2021-05-30 VITALS — BODY MASS INDEX: 22.66 KG/M2 | HEIGHT: 69 IN | WEIGHT: 153 LBS

## 2021-05-30 VITALS — BODY MASS INDEX: 22.76 KG/M2 | WEIGHT: 153 LBS

## 2021-05-30 VITALS — WEIGHT: 159.3 LBS | BODY MASS INDEX: 23.7 KG/M2

## 2021-05-30 VITALS — WEIGHT: 155.6 LBS | BODY MASS INDEX: 23.15 KG/M2

## 2021-05-30 VITALS — BODY MASS INDEX: 23.47 KG/M2 | WEIGHT: 157.8 LBS

## 2021-05-30 NOTE — PROGRESS NOTES
MANTOUX TUBERCULIN (a.k.a. TB) SKIN TEST      What is Tuberculosis/TB?  Tuberculosis/TB is an infectious disease, which is spread through the air by tiny germs when people cough, sneeze, speak or sing.  TB germs are very small and can remain in the air for a long period of time. TB germs most often settle in your lungs, but may also settle in other organs of your body.  TB germs can be present in your body without making you ill.  This is called TB INFECTION.  TB infection cannot be spread to other people.  When your body’s defenses become weak and can no longer control the TB germs they multiply, this is called TB DISEASE.  It can take month(s) to year(s) for TB infection to become TB disease.  The TB SKIN TEST can show if a person has been “infected” by TB germs.    How is the Mantoux Tuberculin/TB skin test given?  A very small amount of a product called Purified Protein Derivative (PPD) is injected just under the top layer of the skin on the forearm.  Persons who have been infected by the TB germs usually react to the test by developing swelling at the injection site.  The skin test results must be read 48 to 72 hours after given.  Failure to return within this time frame means the test will need to be repeated.    Side effects…  Side effects from a skin test are rare and may include itching and discomfort at the injection site and very rarely the possibility of a blister, ulceration or necrosis (dead tissue) at the site if the injection.    Return to:  Any [facility] site on May 25, after  6 p.m. and before 12 p.m. on May 26th to have your test read.   Remote device check.  Please see link for full device report.  Patient was informed of results and next follow up via mail.

## 2021-05-30 NOTE — TELEPHONE ENCOUNTER
ANTICOAGULATION  MANAGEMENT    Assessment     Today's INR result of 2.58 is Therapeutic (goal INR of 2.0-3.0)        Previous INR was Therapeutic    Warfarin given as previously instructed    No new health/diet changes affecting INR    No new medication/supplements affecting INR    Continues to tolerate warfarin with no reported s/s of bleeding or thromboembolism       Plan:     Warfarin Dosing Orders:  Continue current warfarin dose    2.5 mg every Mon, Wed, Fri; 1.25 mg all other days         Next INR: 4 weeks.    Telephone orders given to nurseManny.  Orders read back correctly.     Boo Quesada RN    Subjective/Objective:      Raymond Zheng, a 89 y.o. male is on warfarin. Facility nurse reports for [place]:    Other anticoagulants: No    Medication changes: No     Missed warfarin doses since last INR: No     Abnormal bleeding since last INR: No    New symptoms, injury or illness: No     Upcoming surgery, procedure or cardioversion: No    Recent INR Results:    Lab Results   Component Value Date    INR 2.58 (H) 07/26/2019    INR 2.31 (H) 06/28/2019    INR 2.54 (H) 06/14/2019       Anticoagulation Episode Summary     Current INR goal:   2.0-3.0   TTR:   84.6 % (4.6 y)   Next INR check:   8/23/2019   INR from last check:   2.58 (7/26/2019)   Weekly max warfarin dose:      Target end date:      INR check location:      Preferred lab:      Send INR reminders to:   [facility] (TCU/LTC/[facility])    Indications    A-fib (H) (Resolved) [I48.91]           Comments:            Anticoagulation Care Providers     Provider Role Specialty Phone number    Delmi Regan NP Responsible Nurse Practitioner [phone]

## 2021-05-30 NOTE — TELEPHONE ENCOUNTER
ANTICOAGULATION  MANAGEMENT    Assessment     Today's INR result of 2.31 is Therapeutic (goal INR of 2.0-3.0)        Previous INR was Therapeutic    Warfarin given as previously instructed    No new health/diet changes affecting INR    No new medication/supplements affecting INR    Continues to tolerate warfarin with no reported s/s of bleeding or thromboembolism       Plan:     Warfarin Dosing Orders:  Continue current warfarin dose    2.5 mg every Mon, Wed, Fri; 1.25 mg all other days         Next INR: 4 weeks.     Telephone orders given to nurseAlicja.  Orders read back correctly.     Boo Quesada RN    Subjective/Objective:      Raymond Zheng, a 89 y.o. male is on warfarin. Facility nurse reports for [place]:    Other anticoagulants: No    Medication changes: No     Missed warfarin doses since last INR: No     Abnormal bleeding since last INR: No    New symptoms, injury or illness: No     Upcoming surgery, procedure or cardioversion: No    Recent INR Results:    Lab Results   Component Value Date    INR 2.31 (H) 06/28/2019    INR 2.54 (H) 06/14/2019    INR 1.94 (H) 05/31/2019       Anticoagulation Episode Summary     Current INR goal:   2.0-3.0   TTR:   84.4 % (4.5 y)   Next INR check:   7/26/2019   INR from last check:   2.31 (6/28/2019)   Weekly max warfarin dose:      Target end date:      INR check location:      Preferred lab:      Send INR reminders to:   [facility] (TCU/LTC/[facility])    Indications    A-fib (H) (Resolved) [I48.91]           Comments:            Anticoagulation Care Providers     Provider Role Specialty Phone number    Delmi Regan NP Responsible Nurse Practitioner [phone]

## 2021-05-31 VITALS — HEIGHT: 70 IN | WEIGHT: 157 LBS | BODY MASS INDEX: 22.48 KG/M2

## 2021-05-31 VITALS — BODY MASS INDEX: 23.11 KG/M2 | HEIGHT: 69 IN | WEIGHT: 156 LBS

## 2021-05-31 VITALS — WEIGHT: 155 LBS | BODY MASS INDEX: 22.19 KG/M2 | HEIGHT: 70 IN

## 2021-05-31 VITALS — WEIGHT: 157 LBS | HEIGHT: 69 IN | BODY MASS INDEX: 23.25 KG/M2

## 2021-05-31 VITALS — BODY MASS INDEX: 23.22 KG/M2 | WEIGHT: 155 LBS

## 2021-05-31 VITALS — WEIGHT: 161 LBS | HEIGHT: 69 IN | BODY MASS INDEX: 23.85 KG/M2

## 2021-05-31 VITALS — HEIGHT: 69 IN | BODY MASS INDEX: 23.11 KG/M2 | WEIGHT: 156 LBS

## 2021-05-31 VITALS — WEIGHT: 156 LBS | BODY MASS INDEX: 22.38 KG/M2

## 2021-05-31 VITALS — BODY MASS INDEX: 23.82 KG/M2 | WEIGHT: 159 LBS

## 2021-05-31 VITALS — BODY MASS INDEX: 24.77 KG/M2 | WEIGHT: 165.3 LBS

## 2021-05-31 VITALS — WEIGHT: 158.2 LBS | BODY MASS INDEX: 22.65 KG/M2 | HEIGHT: 70 IN

## 2021-05-31 VITALS — HEIGHT: 69 IN | BODY MASS INDEX: 22.75 KG/M2 | WEIGHT: 153.6 LBS

## 2021-05-31 VITALS — WEIGHT: 157 LBS | BODY MASS INDEX: 22.48 KG/M2 | HEIGHT: 70 IN

## 2021-05-31 VITALS — WEIGHT: 153 LBS | HEIGHT: 69 IN | BODY MASS INDEX: 22.66 KG/M2

## 2021-05-31 VITALS — BODY MASS INDEX: 24.12 KG/M2 | WEIGHT: 161 LBS

## 2021-05-31 VITALS — WEIGHT: 162 LBS | HEIGHT: 69 IN | BODY MASS INDEX: 23.99 KG/M2

## 2021-05-31 NOTE — TELEPHONE ENCOUNTER
.[facility]  ANTICOAGULATION  MANAGEMENT    Assessment     Today's INR result of 3.04 is slightly Supratherapeutic (goal INR of 2.0-3.0)        Previous INR was Therapeutic    Warfarin given as previously instructed    No new health/diet changes affecting INR    No new medication/supplements affecting INR    Continues to tolerate warfarin with no reported s/s of bleeding or thromboembolism       Plan:     Warfarin Dosing Orders:  Continue current warfarin dose 2.5 mg daily on Mon, Wed, Fri; and 1.25 mg daily rest of week  (0 % change)    Next INR: 2 weeks    Telephone orders given to nurseSeema.  Orders read back correctly.     Vanita Ashton RN    Subjective/Objective:      Raymond Zheng, a 89 y.o. male is on warfarin. Facility nurse reports for Raymond:    Other anticoagulants: No    Medication changes: No     Missed warfarin doses since last INR: No     Abnormal bleeding since last INR: No    New symptoms, injury or illness: No     Upcoming surgery, procedure or cardioversion: No    Recent INR Results:    Lab Results   Component Value Date    INR 3.04 (H) 08/23/2019    INR 2.58 (H) 07/26/2019    INR 2.31 (H) 06/28/2019       Anticoagulation Episode Summary     Current INR goal:   2.0-3.0   TTR:   84.7 % (4.6 y)   Next INR check:   9/6/2019   INR from last check:   3.04! (8/23/2019)   Weekly max warfarin dose:      Target end date:      INR check location:      Preferred lab:      Send INR reminders to:   [facility] (TCU/LTC/long-term)    Indications    A-fib (H) (Resolved) [I48.91]           Comments:            Anticoagulation Care Providers     Provider Role Specialty Phone number    Delmi Regan NP Responsible Nurse Practitioner [phone]

## 2021-06-01 ENCOUNTER — RECORDS - HEALTHEAST (OUTPATIENT)
Dept: ADMINISTRATIVE | Facility: CLINIC | Age: 86
End: 2021-06-01

## 2021-06-01 VITALS — HEIGHT: 69 IN | WEIGHT: 165 LBS | BODY MASS INDEX: 24.44 KG/M2

## 2021-06-01 VITALS — WEIGHT: 160 LBS | HEIGHT: 69 IN | BODY MASS INDEX: 23.7 KG/M2

## 2021-06-01 VITALS — HEIGHT: 69 IN | BODY MASS INDEX: 22.81 KG/M2 | WEIGHT: 154 LBS

## 2021-06-01 VITALS — BODY MASS INDEX: 24.44 KG/M2 | WEIGHT: 165 LBS | HEIGHT: 69 IN

## 2021-06-01 VITALS — BODY MASS INDEX: 23.55 KG/M2 | HEIGHT: 69 IN | WEIGHT: 159 LBS

## 2021-06-01 VITALS — WEIGHT: 160 LBS | BODY MASS INDEX: 23.63 KG/M2

## 2021-06-01 NOTE — PROGRESS NOTES
[facility]    DATE: 2019    NAME:  Raymond Zheng             :  1925  MRN: [id number]  CODE STATUS:  POLST on file    VISIT TYPE: Review Of Multiple Medical Conditions     FACILITY:  [facility] [[id number]]       CHIEF COMPLAIN/REASON FOR VISIT:    Chief Complaint   Patient presents with     Review Of Multiple Medical Conditions               HISTORY OF PRESENT ILLNESS: Raymond Zheng is a 89 y.o. male who is a resident of [facility]. He has PMH of A fib, CVA, prostate cancer, HTN, knee osteoarthritis, low back pain, lumbar stenosis, pacemaker, vit b12 deficiency, memory issues, mild pulmonary HTN, dependent edema, dyspnea.     Today Mr. Zheng is seen for review of systems today. He reports that he has been doing well since his last visit in May. He reports no recent illnesses or sicknesses. He is not having any issues with seasonal allergies or colds. He denies any shortness of breath or cough. He has not felt dizzy or lightheaded at all and no recent falls. His meds seem fine and no concerns with them. Since last visit wife was noted to mixup their meds twice but has not happened for some time now. He is using his walker to get around and says this works well for him. He likes the food here and eats very well. He sleeps fine and his bowels are moving much better on the bowel regimen. He denies any recent nausea, vomiting, stomach upset. His back pain is controlled on current meds and seeing a chiropractor regularly. He denies nay trouble urinating and no recent incontinence. He has had the same glasses for some time but has an appt with the eye doctor in one week. He sees them every 3 months and is not doing any injections, just the eye drops. He denies any other recent specialty appointments. His wife asks about his INR and how this has been doing. Otherwise his wife feels he is doing well and she has no concerns with him today.      REVIEW OF SYSTEMS:  PROBLEMS AND REVIEW OF SYSTEMS:   Review of Systems  Today on ROS:   Currently, no fever, chills, or rigors. Decreased vision and hearing. Denies any chest pain, headaches, palpitations, lightheadedness, dizziness. Appetite is good. Denies any GERD symptoms. Denies any difficulty with swallowing, nausea, or vomiting.  Denies any abdominal pain, diarrhea or constipation. Denies any urinary symptoms. No insomnia.  Positive for forgetfulness, short term memory issues, ambulates with walker, no shortness of breath, back pain controlled, wife manages meds, dining small for meals      Allergies   Allergen Reactions     Ciprofloxacin      Erythromycin Base      Oxycodone-Acetaminophen      Sulfa (Sulfonamide Antibiotics)      Current Outpatient Medications   Medication Sig     acetaminophen (TYLENOL) 500 MG tablet Take 1,000 mg by mouth 3 (three) times a day.            atropine 1 % ophthalmic solution 1 drop at bedtime.     cyanocobalamin (VITAMIN B-12) 1000 MCG tablet Take 500 mcg by mouth daily.            FOLIC ACID/MV,FE,MIN (CENTRUM ORAL) Take 1 tablet by mouth daily.      memantine (NAMENDA XR) 7 mg CSpX Take 7 mg by mouth at bedtime.     metoprolol succinate (TOPROL XL) 50 MG 24 hr tablet Take 1 tablet (50 mg total) by mouth daily.     nitroglycerin (NITROSTAT) 0.4 MG SL tablet Place 0.4 mg under the tongue as needed for chest pain.     polyethylene glycol (MIRALAX) 17 gram packet Take 17 g by mouth daily as needed.     prednisoLONE acetate (PRED-FORTE) 1 % ophthalmic suspension 1 drop every 3 (three) hours.     senna (SENOKOT) 8.6 mg tablet Take 2 tablets by mouth daily.            tamsulosin (FLOMAX) 0.4 mg Cp24 Take 1 capsule (0.4 mg total) by mouth daily.     trolamine salicylate (ASPERCREME) 10 % cream Apply 1 application topically every 6 (six) hours as needed.            warfarin (COUMADIN) 2.5 MG tablet Take 0.5-1 tablets (1.25-2.5 mg total) by mouth daily. 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 Followed by [facility].       Vitamin B12 deficiency      Vitiligo            PHYSICAL EXAMINATION  Vitals:    09/04/19 0700   BP: 128/66   Pulse: 60   Resp: 18   Temp: 96.8  F (36  C)   SpO2: 94%       Today on physical exam:     GENERAL: Awake, Alert, oriented x3, not in any form of acute distress, answers questions appropriately, follows simple commands, conversant  HEENT: Head is normocephalic with normal hair distribution. No evidence of trauma. Ears: No acute purulent discharge. Eyes: Conjunctivae pink with no scleral jaundice. Nose: Normal mucosa and septum. NECK: Supple with no cervical or supraclavicular lymphadenopathy. Trachea is midline. Glasses, hearing aids  CHEST: No tenderness or deformity, no crepitus  LUNG: dim to auscultation with good chest expansion. There are no crackles or wheezes, normal AP diameter. No sob  BACK: chronic lumbar pain, localized no radiation, no numb/tingling  CVS: irregularly irregular rhythm, systolic murmur,  2+ pulses symmetric in all extremities.  ABDOMEN: Rounded and soft, nontender to palpation, non distended, no masses, no organomegaly, good bowel sounds, no rebound or guarding, no peritoneal signs.   EXTREMITIES: No pedal edema, darin discoloration ble  SKIN: Warm and dry,   NEUROLOGICAL: The patient is oriented to person, place and time. Forgetful at times, short term memory issues, no numb/tingling ble.            LABS:   Recent Results (from the past 168 hour(s))   INR   Result Value Ref Range    INR 2.71 (H) 0.90 - 1.10      Results for orders placed or performed during the hospital encounter of 05/07/18   Basic Metabolic Panel   Result Value Ref Range    Sodium 134 (L) 136 - 145 mmol/L    Potassium 4.5 3.5 - 5.0 mmol/L    Chloride 99 98 - 107 mmol/L    CO2 23 22 - 31 mmol/L    Anion Gap, Calculation 12 5 - 18 mmol/L    Glucose 104 70 - 125 mg/dL    Calcium 10.0 8.5 - 10.5 mg/dL    BUN 18 8 - 28 mg/dL    Creatinine 1.12 0.70 - 1.30 mg/dL    GFR MDRD Af Amer >60 >60 mL/min/1.73m2    GFR MDRD Non Af Amer >60 >60 mL/min/1.73m2         Lab Results   Component Value Date    WBC 8.6 05/29/2019    HGB 11.2 (L) 05/29/2019    HCT 35.3 (L) 05/29/2019     05/29/2019     05/29/2019       Lab Results   Component Value Date    [id number] 796 10/02/2018     No results found for: HGBA1C  Lab Results   Component Value Date    INR 2.71 (H) 09/06/2019    INR 3.04 (H) 08/23/2019    INR 2.58 (H) 07/26/2019     No results found for: [id number]  Lab Results   Component Value Date    TSH 2.08 08/04/2015           ASSESSMENT/PLAN:    1. Atrial fibrillation: Rate controlled 60s. No chest pain or palpitations. On metoprolol, warfarin. Anticoagulation clinic now managing warfarin in house. Stable recently. Last INR 3.04 on 8/23, recheck on 9/6.   2. HTN: SBP 120s. On metoprolol. No recent concerns.   3. S/p pacemaker: Follows with cardiology, [employer]. 7/29 device check-stable.   4. Pulmonary hypertension, Dyspnea: No recent shortness of breath. No chest pain or recent concerns. Follows with cardiology prn.   5. Cognitive impairment, vascular dementia:  on namenda 7mg xr. Stable recently. No further progression last few months.   6. H/o prostate cancer: No recent issues. On flomax. Radiation seeding in 2004. No recurrence. F/u with urology prn-was following annually but no recent appointments. More difficult for him to get out.   7. Lumbar stenosis, chronic back pain: No radiation, no numb/tingling. On tylenol three times a day and  aspercreme. Sees chiropractor regularly in house. No recent concerns and feels controlled.   8. Macular degeneration, intraocular hemorrhage: Follows with Dr. Bill Figueroa. On atropine and prednisolone gtts. appt next week.   9. H/o CVA: On warfarin, no recent concerns.   10. Vitamin b 12 deficiency: On vitamin b 12 daily.  11. Constipation: On colace prn and taking prune juice. senna daily prn.     Labs in May 2019    Electronically signed by: Delmi Regan NP

## 2021-06-01 NOTE — TELEPHONE ENCOUNTER
ANTICOAGULATION  MANAGEMENT    Assessment     Today's INR result of 2.78 is Therapeutic (goal INR of 2.0-3.0)        Previous INR was Therapeutic    Warfarin given as previously instructed    No new health/diet changes affecting INR    No new medication/supplements affecting INR    Continues to tolerate warfarin with no reported s/s of bleeding or thromboembolism       Plan:     Warfarin Dosing Orders:  Continue current warfarin dose    2.5 mg every Mon, Wed, Fri; 1.25 mg all other days         Next INR: 4 weeks.     Telephone orders given to nurseSeema.  Orders read back correctly.     Boo Quesada RN    Subjective/Objective:      Raymond Zheng, a 89 y.o. male is on warfarin. Facility nurse reports for [place]:    Other anticoagulants: No    Medication changes: No     Missed warfarin doses since last INR: No     Abnormal bleeding since last INR: No    New symptoms, injury or illness: No     Upcoming surgery, procedure or cardioversion: No    Recent INR Results:    Lab Results   Component Value Date    INR 2.78 (H) 09/20/2019    INR 2.71 (H) 09/06/2019    INR 3.04 (H) 08/23/2019       Anticoagulation Episode Summary     Current INR goal:   2.0-3.0   TTR:   68.3 %   Next INR check:   10/18/2019   INR from last check:   2.78 (9/20/2019)   Weekly max warfarin dose:      Target end date:      INR check location:      Preferred lab:      Send INR reminders to:   [facility] (TCU/LTC/MC[facility])    Indications    A-fib (H) (Resolved) [I48.91]           Comments:            Anticoagulation Care Providers     Provider Role Specialty Phone number    Delmi Regan NP Responsible Nurse Practitioner [phone]

## 2021-06-01 NOTE — TELEPHONE ENCOUNTER
ANTICOAGULATION  MANAGEMENT    Assessment     Today's INR result of 2.71 is Therapeutic (goal INR of 2.0-3.0)        Previous INR was slightly Supratherapeutic    Warfarin given as previously instructed    No new health/diet changes affecting INR    No new medication/supplements affecting INR    Continues to tolerate warfarin with no reported s/s of bleeding or thromboembolism       Plan:     Warfarin Dosing Orders:  Continue current warfarin dose 2.5 mg every Mon, Wed, Fri; 1.25 mg all other days    Next INR: 2 weeks.    Telephone orders given to nurseSeema.  Orders read back correctly.     Boo Quesada RN    Subjective/Objective:      Raymond Zheng, a 89 y.o. male is on warfarin. Facility nurse reports for Raymond:    Other anticoagulants: No    Medication changes: No     Missed warfarin doses since last INR: No     Abnormal bleeding since last INR: No    New symptoms, injury or illness: No     Upcoming surgery, procedure or cardioversion: No    Recent INR Results:    Lab Results   Component Value Date    INR 2.71 (H) 09/06/2019    INR 3.04 (H) 08/23/2019    INR 2.58 (H) 07/26/2019       Anticoagulation Episode Summary     Current INR goal:   2.0-3.0   TTR:   68.3 %   Next INR check:   9/20/2019   INR from last check:   2.71 (9/6/2019)   Weekly max warfarin dose:      Target end date:      INR check location:      Preferred lab:      Send INR reminders to:   [facility] (TCU/LTC/half-way)    Indications    A-fib (H) (Resolved) [I48.91]           Comments:            Anticoagulation Care Providers     Provider Role Specialty Phone number    Delmi Regan NP Responsible Nurse Practitioner [phone]

## 2021-06-02 VITALS — BODY MASS INDEX: 23.55 KG/M2 | HEIGHT: 69 IN | WEIGHT: 159 LBS

## 2021-06-02 VITALS — BODY MASS INDEX: 24.78 KG/M2 | WEIGHT: 169 LBS

## 2021-06-02 VITALS — WEIGHT: 170 LBS | BODY MASS INDEX: 24.92 KG/M2

## 2021-06-02 VITALS — HEIGHT: 69 IN | WEIGHT: 161.2 LBS | BODY MASS INDEX: 23.88 KG/M2

## 2021-06-02 VITALS — BODY MASS INDEX: 24.92 KG/M2 | WEIGHT: 170 LBS

## 2021-06-02 NOTE — TELEPHONE ENCOUNTER
ANTICOAGULATION  MANAGEMENT    Assessment     Today's INR result of 2.64 is Therapeutic (goal INR of 2.0-3.0)        Previous INR was Therapeutic    Warfarin given as previously instructed    No new health/diet changes affecting INR    No new medication/supplements affecting INR    Continues to tolerate warfarin with no reported s/s of bleeding or thromboembolism       Plan:     Warfarin Dosing Orders:  Continue current warfarin dose    2.5 mg every Mon, Wed, Fri; 1.25 mg all other days         Next INR: 4 weeks.     Telephone orders given to nurseSeema.  Orders read back correctly.     Boo Quesada RN    Subjective/Objective:      Raymond Zheng, a 89 y.o. male is on warfarin. Facility nurse reports for [place]:    Other anticoagulants: No    Medication changes: No     Missed warfarin doses since last INR: No     Abnormal bleeding since last INR: No    New symptoms, injury or illness: No     Upcoming surgery, procedure or cardioversion: No    Recent INR Results:    Lab Results   Component Value Date    INR 2.64 (H) 10/18/2019    INR 2.78 (H) 09/20/2019    INR 2.71 (H) 09/06/2019       Anticoagulation Episode Summary     Current INR goal:   2.0-3.0   TTR:   68.3 %   Next INR check:   11/15/2019   INR from last check:   2.64 (10/18/2019)   Weekly max warfarin dose:      Target end date:      INR check location:      Preferred lab:      Send INR reminders to:   [facility] (TCU/LTC/snf)    Indications    A-fib (H) (Resolved) [I48.91]           Comments:            Anticoagulation Care Providers     Provider Role Specialty Phone number    Delmi Regan NP Responsible Nurse Practitioner [phone]

## 2021-06-03 VITALS — WEIGHT: 156 LBS | BODY MASS INDEX: 22.87 KG/M2

## 2021-06-03 NOTE — TELEPHONE ENCOUNTER
ANTICOAGULATION  MANAGEMENT    Assessment     Today's INR result of 2.79 is Therapeutic (goal INR of 2.0-3.0)        Previous INR was Therapeutic    Warfarin given as previously instructed    No new health/diet changes affecting INR    No new medication/supplements affecting INR    Continues to tolerate warfarin with no reported s/s of bleeding or thromboembolism       Plan:     Warfarin Dosing Orders:  Continue current warfarin dose    2.5 mg every Mon, Wed, Fri; 1.25 mg all other days         Next INR: 4 weeks.     Telephone orders given to nurseSeema.  Orders read back correctly.     Boo Quesada RN    Subjective/Objective:      Raymond Zheng, a 89 y.o. male is on warfarin. Facility nurse reports for [place]:    Other anticoagulants: No    Medication changes: No     Missed warfarin doses since last INR: No     Abnormal bleeding since last INR: No    New symptoms, injury or illness: No     Upcoming surgery, procedure or cardioversion: No    Recent INR Results:    Lab Results   Component Value Date    INR 2.79 (H) 11/15/2019    INR 2.64 (H) 10/18/2019    INR 2.78 (H) 09/20/2019       Anticoagulation Episode Summary     Current INR goal:   2.0-3.0   TTR:   75.1 %   Next INR check:   12/13/2019   INR from last check:   2.79 (11/15/2019)   Weekly max warfarin dose:      Target end date:      INR check location:      Preferred lab:      Send INR reminders to:   [facility] (TCU/LTC/long-term)    Indications    A-fib (H) (Resolved) [I48.91]           Comments:            Anticoagulation Care Providers     Provider Role Specialty Phone number    Delmi Regan NP Responsible Nurse Practitioner [phone]         Vital Signs Last 24 Hrs  T(C): 36.7 (05 Jul 2019 17:49), Max: 36.7 (05 Jul 2019 09:22)  T(F): 98 (05 Jul 2019 17:49), Max: 98 (05 Jul 2019 09:22)  HR: 75 (05 Jul 2019 17:49) (75 - 99)  BP: 180/96 (05 Jul 2019 17:49) (160/105 - 236/146)  RR: 14 (05 Jul 2019 17:49) (14 - 18)  SpO2: 100% (05 Jul 2019 17:49) (96% - 100%)    PHYSICAL EXAM:  GENERAL: NAD, obese   HEAD:  Atraumatic, Normocephalic  EYES: EOMI, conjunctiva and sclera clear  ENMT: No tonsillar erythema, exudates, or enlargement; Moist mucous membranes  NECK: Supple, No JVD  NERVOUS SYSTEM: AOX3, motor and sensation grossly intact in b/l UE and b/l LE  PSYCHIATRIC: Appropriate affect and mood  CHEST/LUNG: Clear to auscultation bilaterally; No rales, rhonchi, wheezing, or rubs  HEART: Regular rate and rhythm; No murmurs, rubs, or gallops. No LE edema  ABDOMEN: Soft, Nontender, Nondistended; Bowel sounds present  EXTREMITIES:  2+ Peripheral Pulses, No clubbing, cyanosis  SKIN: No rashes or lesions

## 2021-06-04 VITALS
BODY MASS INDEX: 20.97 KG/M2 | TEMPERATURE: 96.2 F | DIASTOLIC BLOOD PRESSURE: 96 MMHG | OXYGEN SATURATION: 90 % | RESPIRATION RATE: 18 BRPM | HEART RATE: 73 BPM | WEIGHT: 143 LBS | SYSTOLIC BLOOD PRESSURE: 137 MMHG

## 2021-06-04 VITALS
BODY MASS INDEX: 22.87 KG/M2 | TEMPERATURE: 98.5 F | RESPIRATION RATE: 16 BRPM | HEART RATE: 66 BPM | OXYGEN SATURATION: 98 % | DIASTOLIC BLOOD PRESSURE: 73 MMHG | WEIGHT: 156 LBS | SYSTOLIC BLOOD PRESSURE: 127 MMHG

## 2021-06-04 VITALS
BODY MASS INDEX: 25.07 KG/M2 | RESPIRATION RATE: 16 BRPM | TEMPERATURE: 96.7 F | OXYGEN SATURATION: 96 % | DIASTOLIC BLOOD PRESSURE: 80 MMHG | HEART RATE: 78 BPM | WEIGHT: 171 LBS | SYSTOLIC BLOOD PRESSURE: 140 MMHG

## 2021-06-04 VITALS
DIASTOLIC BLOOD PRESSURE: 77 MMHG | WEIGHT: 148 LBS | SYSTOLIC BLOOD PRESSURE: 129 MMHG | BODY MASS INDEX: 21.7 KG/M2 | HEART RATE: 94 BPM | RESPIRATION RATE: 20 BRPM | TEMPERATURE: 98 F | OXYGEN SATURATION: 94 %

## 2021-06-04 NOTE — TELEPHONE ENCOUNTER
ANTICOAGULATION  MANAGEMENT    Assessment     Today's INR result of 2.75 is Therapeutic (goal INR of 2.0-3.0)        Previous INR was Therapeutic    Warfarin given as previously instructed    No new health/diet changes affecting INR    No new medication/supplements affecting INR    Continues to tolerate warfarin with no reported s/s of bleeding or thromboembolism       Plan:     Warfarin Dosing Orders:  Continue current warfarin dose    2.5 mg every Mon, Wed, Fri; 1.25 mg all other days         Next INR: 4 weeks.    Telephone orders given to nurseAlicja.  Orders read back correctly.     Boo Quesada RN    Subjective/Objective:      Raymond Zheng, a 89 y.o. male is on warfarin. Facility nurse reports for Raymond:    Other anticoagulants: No    Medication changes: No     Missed warfarin doses since last INR: No     Abnormal bleeding since last INR: No    New symptoms, injury or illness: No     Upcoming surgery, procedure or cardioversion: No    Recent INR Results:    Lab Results   Component Value Date    INR 2.75 (H) 12/13/2019    INR 2.79 (H) 11/15/2019    INR 2.64 (H) 10/18/2019       Anticoagulation Episode Summary     Current INR goal:   2.0-3.0   TTR:   82.7 % (1 y)   Next INR check:   1/10/2020   INR from last check:   2.75 (12/13/2019)   Weekly max warfarin dose:      Target end date:      INR check location:      Preferred lab:      Send INR reminders to:   [facility] (TCU/LTC/FDC)    Indications    A-fib (H) (Resolved) [I48.91]           Comments:            Anticoagulation Care Providers     Provider Role Specialty Phone number    Delmi Regan NP Responsible Nurse Practitioner [phone]

## 2021-06-04 NOTE — PROGRESS NOTES
[facility]    DATE: 2019    NAME:  Raymond Zheng             :  1925  MRN: [id number]  CODE STATUS:  POLST on file    VISIT TYPE: Review Of Multiple Medical Conditions     FACILITY:  [facility] [[id number]]       CHIEF COMPLAIN/REASON FOR VISIT:    Chief Complaint   Patient presents with     Review Of Multiple Medical Conditions               HISTORY OF PRESENT ILLNESS: Raymond Zheng is a 89 y.o. male who is a resident of [facility]. He has PMH of A fib, CVA, prostate cancer, HTN, knee osteoarthritis, low back pain, lumbar stenosis, pacemaker, vit b12 deficiency, memory issues, mild pulmonary HTN, dependent edema, dyspnea.     Today Mr. Zheng is seen for review of systems. He is seen in apartment with wife present. He says he has been doing very well recently. He has not had any recent illnesses and seems to sleep pretty well. He says his appetite is good and no heartburn or stomach upset. He denies any back or leg pain recently. He says this has been doing much better lately. He doesn't think he has been taking anything for pain. He does not recall taking tylenol. He denies any recent fevers, chills. He is not having any urinary trouble. He says his memory seems fine and doesn't think it has gotten any worse recently. His wife says he has been pretty steady recently. She continues to set up his meds for him. He denies any recent headaches or dizziness and says his only problem is being old and if there was a pill for this he would be happy to take it. He says he used to ice skate and roller skate but he cannot do that anymore. He does use his walker all the times and no recent falls. He thinks his weights have been ok and his wife says he is staying right around of 148lbs. He denies any numbness or tingling in legs. He says he has no concerns about his meds. His wife says he has some lab work on this coming Monday and then the  following Monday he has a routine follow up with his urologist. He has a history of prostate cancer so this is just his annual follow up. His wife says she has not been giving him any tylenol because he has not had ay issues with pain recently. His blood pressure is noted to be high in 170s today. Discussed increasing his metoprolol dose. He and his wife have no other concerns today. He had a device check on 11/4 that was stable. He last had inr on 11/15 and recheck was for 4 weeks. He had labs in may that were stable. We discussed rechecking at this time but will wait until next visit since he is having some labs on Monday per urology. His wife says she will request they send records to [facility] for his chart. He and his wife say they do not have any family around here so they will not be doing anything or travelling for the holidays.     REVIEW OF SYSTEMS:  PROBLEMS AND REVIEW OF SYSTEMS:   Review of Systems  Today on ROS:   Currently, no fever, chills, or rigors. Decreased vision and hearing. Denies any chest pain, headaches, palpitations, lightheadedness, dizziness. Appetite is good. Denies any GERD symptoms. Denies any difficulty with swallowing, nausea, or vomiting.  Denies any abdominal pain, diarrhea or constipation. Denies any urinary symptoms. No insomnia.  Positive for forgetfulness, short term memory issues stable, ambulates with walker, no shortness of breath, no recent back pain, wife manages meds, dining small for meals, elevated blood pressure      Allergies   Allergen Reactions     Ciprofloxacin      Erythromycin Base      Oxycodone-Acetaminophen      Sulfa (Sulfonamide Antibiotics)      Current Outpatient Medications   Medication Sig     acetaminophen (TYLENOL) 500 MG tablet Take 1,000 mg by mouth 3 (three) times a day as needed for pain.      atropine 1 % ophthalmic solution 1 drop at bedtime.     cyanocobalamin (VITAMIN B-12) 1000 MCG tablet Take 500 mcg by mouth daily.            FOLIC  ACID/MV,FE,MIN (CENTRUM ORAL) Take 1 tablet by mouth daily.      memantine (NAMENDA XR) 7 mg CSpX Take 7 mg by mouth at bedtime.     metoprolol succinate (TOPROL XL) 50 MG 24 hr tablet Take 1 tablet (50 mg total) by mouth daily. (Patient taking differently: Take 75 mg by mouth daily. )     nitroglycerin (NITROSTAT) 0.4 MG SL tablet Place 0.4 mg under the tongue as needed for chest pain.     polyethylene glycol (MIRALAX) 17 gram packet Take 17 g by mouth daily as needed.     prednisoLONE acetate (PRED-FORTE) 1 % ophthalmic suspension 1 drop every 3 (three) hours.     senna (SENOKOT) 8.6 mg tablet Take 2 tablets by mouth daily.            tamsulosin (FLOMAX) 0.4 mg Cp24 Take 1 capsule (0.4 mg total) by mouth daily.     trolamine salicylate (ASPERCREME) 10 % cream Apply 1 application topically every 6 (six) hours as needed.            warfarin (COUMADIN) 2.5 MG tablet Take 0.5-1 tablets (1.25-2.5 mg total) by mouth daily. 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 Followed by [facility].       Vitamin B12 deficiency      Vitiligo            PHYSICAL EXAMINATION  Vitals:    12/04/19 0700   BP: 175/70   Pulse: 70   Resp: 20   Temp: 97.5  F (36.4  C)   SpO2: 99%       Today on physical exam:     GENERAL: Awake, Alert, oriented x3, not in any form of acute distress, answers questions appropriately, follows simple commands, conversant  HEENT: Head is normocephalic with normal hair distribution. No evidence of trauma. Ears: No acute purulent discharge. Eyes: Conjunctivae pink with no scleral  jaundice. Nose: Normal mucosa and septum. NECK: Supple with no cervical or supraclavicular lymphadenopathy. Trachea is midline. Glasses, hearing aids, [place]  CHEST: No tenderness or deformity, no crepitus  LUNG: dim to auscultation with good chest expansion. There are no crackles or wheezes, normal AP diameter. No shortness of breath no cough  BACK: chronic lumbar pain, localized no radiation, no numb/tingling  CVS: irregularly irregular rhythm, systolic murmur,  2+ pulses symmetric in all extremities.  ABDOMEN: Rounded and soft, nontender to palpation, non distended, no masses, no organomegaly, good bowel sounds, no rebound or guarding, no peritoneal signs.   EXTREMITIES: No pedal edema, darin discoloration ble  SKIN: Warm and dry,   NEUROLOGICAL: The patient is oriented to person, place and time. Forgetful at times, short term memory issues, no numb/tingling ble.            LABS:   No results found for this or any previous visit (from the past 168 hour(s)).  Results for orders placed or performed during the hospital encounter of 05/07/18   Basic Metabolic Panel   Result Value Ref Range    Sodium 134 (L) 136 - 145 mmol/L    Potassium 4.5 3.5 - 5.0 mmol/L    Chloride 99 98 - 107 mmol/L    CO2 23 22 - 31 mmol/L    Anion Gap, Calculation 12 5 - 18 mmol/L    Glucose 104 70 - 125 mg/dL    Calcium 10.0 8.5 - 10.5 mg/dL    BUN 18 8 - 28 mg/dL    Creatinine 1.12 0.70 - 1.30 mg/dL    GFR MDRD Af Amer >60 >60 mL/min/1.73m2    GFR MDRD Non Af Amer >60 >60 mL/min/1.73m2         Lab Results   Component Value Date    WBC 8.6 05/29/2019    HGB 11.2 (L) 05/29/2019    HCT 35.3 (L) 05/29/2019     05/29/2019     05/29/2019       Lab Results   Component Value Date    [id number] 796 10/02/2018     No results found for: HGBA1C  Lab Results   Component Value Date    INR 2.79 (H) 11/15/2019    INR 2.64 (H) 10/18/2019    INR 2.78 (H) 09/20/2019     No results found for: [id number]  Lab Results   Component Value Date    TSH  2.08 08/04/2015           ASSESSMENT/PLAN:    1. Atrial fibrillation: Rate controlled 70s. No chest pain or palpitations. On metoprolol, warfarin. Anticoagulation clinic now managing warfarin in house. Stable recently. Last INR 2.79 recheck in 4 weeks. Stable on current dosing.   2. HTN: SBP 170s. On metoprolol. Elevated recently, will increase metoprolol dose to 75mg daily.   3. S/p pacemaker: Follows with cardiology, [email or web address]. 11/4 remote device check, stable.   4. Pulmonary hypertension, Dyspnea: No recent shortness of breath. No chest pain or recent concerns. Follows with cardiology prn.   5. Cognitive impairment, vascular dementia:  on namenda 7mg xr. Stable recently. No further progression last few months, appears stable.   6. H/o prostate cancer: No recent issues. On flomax. Radiation seeding in 2004. No recurrence. F/u with urology-labs on 12/9 and appt on 12/16.   7. Lumbar stenosis, chronic back pain: No radiation, no numb/tingling. Per wife and pt no recent tylenol use. Changed on med list to prn as already doing this. aspercreme prn. Sees chiropractor regularly in house. Doing well lately. Ambulates with walker.   8. Macular degeneration, intraocular hemorrhage: Follows with Dr. Bill Figueroa. On atropine and prednisolone gtts. appt next week.   9. H/o CVA: On warfarin, no recent concerns.   10. Vitamin b 12 deficiency: On vitamin b 12 daily.  11. Constipation: On colace prn and taking prune juice. senna daily prn. No recent issues.     Labs in May 2019, consider rechecking at next visit. Has labs on 12/9, will see what is resulted from this first.     Electronically signed by: Delmi Regan NP

## 2021-06-05 VITALS — WEIGHT: 153 LBS | BODY MASS INDEX: 22.43 KG/M2

## 2021-06-05 VITALS
WEIGHT: 142 LBS | BODY MASS INDEX: 20.82 KG/M2 | TEMPERATURE: 97 F | DIASTOLIC BLOOD PRESSURE: 75 MMHG | HEART RATE: 86 BPM | SYSTOLIC BLOOD PRESSURE: 127 MMHG | RESPIRATION RATE: 18 BRPM | OXYGEN SATURATION: 96 %

## 2021-06-05 VITALS — BODY MASS INDEX: 22.43 KG/M2 | WEIGHT: 153 LBS

## 2021-06-05 NOTE — TELEPHONE ENCOUNTER
ANTICOAGULATION  MANAGEMENT    Assessment     Today's INR result of 2.61 is Therapeutic (goal INR of 2.0-3.0)        Previous INR was Therapeutic    Warfarin given as previously instructed    No new health/diet changes affecting INR    No new medication/supplements affecting INR    Continues to tolerate warfarin with no reported s/s of bleeding or thromboembolism       Plan:     Warfarin Dosing Orders:  Continue current warfarin dose    2.5 mg every Mon, Wed, Fri; 1.25 mg all other days         Next INR: 5 weeks.     Telephone orders given to nurseSeema.  Orders read back correctly.     Boo Quesada RN    Subjective/Objective:      Raymond Zheng, a 89 y.o. male is on warfarin. Facility nurse reports for Raymond:    Other anticoagulants: No    Medication changes: No     Missed warfarin doses since last INR: No     Abnormal bleeding since last INR: No    New symptoms, injury or illness: No     Upcoming surgery, procedure or cardioversion: No    Recent INR Results:    Lab Results   Component Value Date    INR 2.61 (H) 01/10/2020    INR 2.75 (H) 12/13/2019    INR 2.79 (H) 11/15/2019       Anticoagulation Episode Summary     Current INR goal:   2.0-3.0   TTR:   83.0 % (1 y)   Next INR check:   2/14/2020   INR from last check:   2.61 (1/10/2020)   Weekly max warfarin dose:      Target end date:      INR check location:      Preferred lab:      Send INR reminders to:   [facility] (TCU/LTC/[facility])    Indications    A-fib (H) (Resolved) [I48.91]           Comments:            Anticoagulation Care Providers     Provider Role Specialty Phone number    Delmi Regan NP Responsible Nurse Practitioner [phone]

## 2021-06-06 NOTE — TELEPHONE ENCOUNTER
ANTICOAGULATION  MANAGEMENT    Assessment     Today's INR result of 2.61 is Therapeutic (goal INR of 2.0-3.0)        Previous INR was Supratherapeutic    Warfarin given as previously instructed    No new health/diet changes affecting INR    No new medication/supplements affecting INR    Continues to tolerate warfarin with no reported s/s of bleeding or thromboembolism       Plan:     Warfarin Dosing Orders:  Continue current warfarin dose    2.5 mg every Mon, Wed, Fri; 1.25 mg all other days         Next INR: 3/2.     Telephone orders given to nurseAlicja.  Orders read back correctly.     Boo Quesada RN    Subjective/Objective:      Raymond Zheng, a 89 y.o. male is on warfarin. Facility nurse reports for Raymond:    Other anticoagulants: No    Medication changes: No     Missed warfarin doses since last INR: No     Abnormal bleeding since last INR: No    New symptoms, injury or illness: No     Upcoming surgery, procedure or cardioversion: No    Recent INR Results:    Lab Results   Component Value Date    INR 2.61 (H) 02/21/2020    INR 4.12 (H) 02/14/2020    INR 2.61 (H) 01/10/2020       Anticoagulation Episode Summary     Current INR goal:   2.0-3.0   TTR:   74.5 % (1 y)   Next INR check:   3/2/2020   INR from last check:   2.61 (2/21/2020)   Weekly max warfarin dose:      Target end date:      INR check location:      Preferred lab:      Send INR reminders to:   [facility] (TCU/LTC/correction)    Indications    A-fib (H) (Resolved) [I48.91]           Comments:            Anticoagulation Care Providers     Provider Role Specialty Phone number    Delmi Regan NP Responsible Nurse Practitioner [phone]

## 2021-06-06 NOTE — PROGRESS NOTES
[facility]    DATE: 3/4/2020    NAME:  Raymond Zheng             :  1925  MRN: [id number]  CODE STATUS:  POLST on file    VISIT TYPE: Review Of Multiple Medical Conditions (depression, cva, memory issues, a fib)     FACILITY:  [facility] [[id number]]       CHIEF COMPLAIN/REASON FOR VISIT:    Chief Complaint   Patient presents with     Review Of Multiple Medical Conditions     depression, cva, memory issues, a fib               HISTORY OF PRESENT ILLNESS: Raymond Zheng is a 89 y.o. male who is a resident of [facility]. He has PMH of A fib, CVA, prostate cancer, HTN, knee osteoarthritis, low back pain, lumbar stenosis, pacemaker, vit b12 deficiency, memory issues, mild pulmonary HTN, dependent edema, dyspnea.     Today Mr. Zheng is seen for review of systems today and follow up on memory issues, depression, cva, a fib. He is seen in his room alone. He says he is doing well today. He has no pain and has not had any back pain recently. He cannot recall the last time he needed tylenol or anything for pain. He says he sleeps well and is eating well. His services are all over for [place] now and he reports everything went very well. They had a good turn out. He denies any worse depression symptoms today and says he is just getting used to his new normal without her. He has a lot of friends around here. He denies any cough or urinary trouble. He is not having any constipation or diarrhea. He is taking his meds as ordered and no recent concerns with his memory. Nursing reports that with stopping some of his supplements recently he is just taking one medication in the morning his senna and wondering if they can change that to evening because then he would not have to pay services for two med administrations a day. Otherwise his vitals and weight has been stable. Now that his wife is passed his health care agent noted on his ACP documents is  per Harear who is a friend.     REVIEW OF SYSTEMS:  PROBLEMS AND REVIEW OF SYSTEMS:   Review of Systems  Today on ROS:   Currently, no fever, chills, or rigors. Decreased vision and hearing. Denies any chest pain, headaches, palpitations, lightheadedness, dizziness. Appetite is good. Denies any GERD symptoms. Denies any difficulty with swallowing, nausea, or vomiting.  Denies any abdominal pain, diarrhea or constipation. Denies any urinary symptoms. No insomnia.  Positive for forgetfulness, ambulates with walker, no shortness of breath, no recent back pain, nursing manages and administers meds, dining small for meals      Allergies   Allergen Reactions     Ciprofloxacin      Erythromycin Base      Oxycodone-Acetaminophen      Sulfa (Sulfonamide Antibiotics)      Current Outpatient Medications   Medication Sig     acetaminophen (TYLENOL) 500 MG tablet Take 1,000 mg by mouth 3 (three) times a day as needed for pain.      atropine 1 % ophthalmic solution 1 drop at bedtime.     memantine (NAMENDA XR) 7 mg CSpX Take 7 mg by mouth at bedtime.     metoprolol succinate (TOPROL XL) 50 MG 24 hr tablet Take 1.5 tablets (75 mg total) by mouth daily.     nitroglycerin (NITROSTAT) 0.4 MG SL tablet Place 0.4 mg under the tongue as needed for chest pain.     polyethylene glycol (MIRALAX) 17 gram packet Take 17 g by mouth daily as needed.     prednisoLONE acetate (PRED-FORTE) 1 % ophthalmic suspension 1 drop every 3 (three) hours.     senna (SENOKOT) 8.6 mg tablet Take 2 tablets by mouth at bedtime.      tamsulosin (FLOMAX) 0.4 mg Cp24 Take 1 capsule (0.4 mg total) by mouth daily.     trolamine salicylate (ASPERCREME) 10 % cream Apply 1 application topically every 6 (six) hours as needed.            warfarin (COUMADIN) 2.5 MG tablet Take 0.5-1 tablets (1.25-2.5 mg total) by mouth daily. 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 Followed by [facility].       Vitamin B12 deficiency      Vitiligo            PHYSICAL EXAMINATION  Vitals:    03/04/20 0700   BP: 161/83   Pulse: 61   Resp: 14   Temp: 98.1  F (36.7  C)   SpO2: 97%       Today on physical exam:     GENERAL: Awake, Alert, not in any form of acute distress, answers questions appropriately, follows simple commands, conversant  HEENT: Head is normocephalic with normal hair distribution. No evidence of trauma. Ears: No acute purulent discharge. Eyes: Conjunctivae pink with no scleral jaundice. Nose: Normal mucosa and septum. NECK: Supple with no cervical or supraclavicular lymphadenopathy. Trachea is midline. Glasses, hearing aids, [place]  CHEST: No tenderness or deformity, no crepitus  LUNG: dim to auscultation with good chest expansion. There are no crackles or wheezes, normal AP diameter. No shortness of breath no cough  BACK: chronic lumbar pain but denies today, no radiation, no numb/tingling  CVS: irregularly irregular rhythm, systolic murmur,  2+ pulses symmetric in all extremities.  ABDOMEN: Rounded and soft, nontender to palpation, non distended, no masses, no organomegaly, good bowel sounds, no rebound or guarding, no peritoneal signs.   EXTREMITIES: No pedal edema, darin discoloration ble  SKIN: Warm and dry,   NEUROLOGICAL: The patient is oriented to person, place and time. Forgetful at times, no numb/tingling ble. Very pleasant, mild flattened affect than baseline, mild depressive symptoms            LABS:   Recent Results (from the past 168 hour(s))   INR   Result Value Ref Range    INR 3.14 (H) 0.90 - 1.10     Results for orders placed or  performed during the hospital encounter of 05/07/18   Basic Metabolic Panel   Result Value Ref Range    Sodium 134 (L) 136 - 145 mmol/L    Potassium 4.5 3.5 - 5.0 mmol/L    Chloride 99 98 - 107 mmol/L    CO2 23 22 - 31 mmol/L    Anion Gap, Calculation 12 5 - 18 mmol/L    Glucose 104 70 - 125 mg/dL    Calcium 10.0 8.5 - 10.5 mg/dL    BUN 18 8 - 28 mg/dL    Creatinine 1.12 0.70 - 1.30 mg/dL    GFR MDRD Af Amer >60 >60 mL/min/1.73m2    GFR MDRD Non Af Amer >60 >60 mL/min/1.73m2         Lab Results   Component Value Date    WBC 8.6 05/29/2019    HGB 11.2 (L) 05/29/2019    HCT 35.3 (L) 05/29/2019     05/29/2019     05/29/2019       Lab Results   Component Value Date    [id number] 796 10/02/2018     No results found for: HGBA1C  Lab Results   Component Value Date    INR 3.14 (H) 03/02/2020    INR 2.61 (H) 02/21/2020    INR 4.12 (H) 02/14/2020     No results found for: [id number]  Lab Results   Component Value Date    TSH 2.08 08/04/2015           ASSESSMENT/PLAN:    1. Atrial fibrillation: Rate controlled 60s. No chest pain or palpitations. On metoprolol, warfarin. Anticoagulation clinic manages warfarin.   2. HTN: SBP 160s. On metoprolol. Stable since recent increase in metoprolol.  3. S/p pacemaker: Follows with cardiology, [facility]. No recent concerns.   4. Pulmonary hypertension, Dyspnea: No recent shortness of breath. No chest pain or recent concerns. Follows with cardiology prn.   5. Cognitive impairment, vascular dementia:  on namenda 7mg xr. Very slow progressive decline, concern for further decline now that his wife has passed. Monitor closely and provide safe environment. No recent safety concerns or behavioral issues.   6. H/o prostate cancer: No recent issues. On flomax. Radiation seeding in 2004. Last follow up in December. No concerns today.   7. Lumbar stenosis, chronic back pain: No radiation, no numb/tingling. Tylenol prn, aspercreme prn. Sees chiropractor regularly in facility.  No recent pain or concerns.   8. Macular degeneration, intraocular hemorrhage: Follows with Dr. Bill Figueroa. On atropine and prednisolone gtts. No concerns today.   9. H/o CVA: On warfarin, no recent concerns.   10. Constipation: On colace prn and taking prune juice. senna daily prn. Stable recently.   11. Depression, Grief: appears to be coping well today, in good spirits. Appears hygiene is in order today and self cares are still appropriate. Nursing has taken over med management and administration. Doing well so far, appropriately grieving.   Weights: 159lbs today. No recent concerns or changes in appetite.     Due for labs at next visit    Electronically signed by: Delmi Regan NP

## 2021-06-06 NOTE — TELEPHONE ENCOUNTER
ANTICOAGULATION  MANAGEMENT    Assessment     Today's INR result of 2.83 is Therapeutic (goal INR of 2.0-3.0)        Previous INR was Supratherapeutic    Warfarin given as previously instructed    No new health/diet changes affecting INR    No new medication/supplements affecting INR    Continues to tolerate warfarin with no reported s/s of bleeding or thromboembolism       Plan:     Warfarin Dosing Orders:  Continue current warfarin dose    2.5 mg every Wed, Sat; 1.25 mg all other days         Next INR: 2 weeks.    Telephone orders given to nurseAlicja.  Orders read back correctly.     Boo Quesada RN    Subjective/Objective:      Raymond Zheng, a 89 y.o. male is on warfarin. Facility nurse reports for Raymond:    Other anticoagulants: No    Medication changes: No     Missed warfarin doses since last INR: No     Abnormal bleeding since last INR: No    New symptoms, injury or illness: No     Upcoming surgery, procedure or cardioversion: No    Recent INR Results:    Lab Results   Component Value Date    INR 2.83 (H) 03/11/2020    INR 3.14 (H) 03/02/2020    INR 2.61 (H) 02/21/2020       Anticoagulation Episode Summary     Current INR goal:   2.0-3.0   TTR:   73.7 % (1 y)   Next INR check:   3/25/2020   INR from last check:   2.83 (3/11/2020)   Weekly max warfarin dose:      Target end date:      INR check location:      Preferred lab:      Send INR reminders to:   [facility] (TCU/LTC/intermediate)    Indications    A-fib (H) (Resolved) [I48.91]           Comments:            Anticoagulation Care Providers     Provider Role Specialty Phone number    Delmi Regan NP Responsible Nurse Practitioner [phone]

## 2021-06-06 NOTE — PROGRESS NOTES
"            [facility]    DATE: 2020    NAME:  Raymond Zheng             :  1925  MRN: [id number]  CODE STATUS:  POLST on file    VISIT TYPE: Problem Visit (depression)     FACILITY:  [facility] [[id number]]       CHIEF COMPLAIN/REASON FOR VISIT:    Chief Complaint   Patient presents with     Problem Visit     depression               HISTORY OF PRESENT ILLNESS: Raymond Zheng is a 89 y.o. male who is a resident of [facility]. He has PMH of A fib, CVA, prostate cancer, HTN, knee osteoarthritis, low back pain, lumbar stenosis, pacemaker, vit b12 deficiency, memory issues, mild pulmonary HTN, dependent edema, dyspnea.     Today Mr. Zheng is seen for concerns of depression and well being check as his wife just passed away this week. She had been rather ill recently and in and out of the hospital and then in tcu. Due to the quarantine from norovirus and being unable to drive, he was not able to see her much but was able to see her in the tcu the day before she passed. After her stroke and return to the hospital he was able to get a ride over the hospital and see her as she did pass away. He is seen today in his apartment alone. He says he does have some relatives in town helping with the service arrangements for Sydni. He says he has some living sisters still and sydni had some extended family. He says it has been lonely being here alone but he has a lot of friends around the facilities and he is still going down to the dining small for all his meals. He says the nurses are helping him with his medications and he is still able to dress himself. He gets around with his walker all the time. When asked how he is feeling about his wife's passing after 60 some years of marriage and he says \"Oh that is life I guess\". He is surprisingly somewhat positive and upbeat today. He says he is sad and mourning in his own way but he feels he is doing ok and has to " keep moving on. He denies any trouble with his bowels or breathing. He hs not been ill at all. He is taking his medications every day and eating fine. He doesn't think he has lost weight at all recently. He has no pain. Discussed with  Nursing staff and they report he has been in pretty good spirits recently. They are setting up and administering his meds now. They have not changed any other services but it was suspected his wife was helping him more than staff are aware so over time will tell if he needs additional services.     REVIEW OF SYSTEMS:  PROBLEMS AND REVIEW OF SYSTEMS:   Review of Systems  Today on ROS:   Currently, no fever, chills, or rigors. Decreased vision and hearing. Denies any chest pain, headaches, palpitations, lightheadedness, dizziness. Appetite is good. Denies any GERD symptoms. Denies any difficulty with swallowing, nausea, or vomiting.  Denies any abdominal pain, diarrhea or constipation. Denies any urinary symptoms. No insomnia.  Positive for forgetfulness, short term memory issues, ambulates with walker, no shortness of breath, no recent back pain, nursing now manages meds, dining small for meals      Allergies   Allergen Reactions     Ciprofloxacin      Erythromycin Base      Oxycodone-Acetaminophen      Sulfa (Sulfonamide Antibiotics)      Current Outpatient Medications   Medication Sig     acetaminophen (TYLENOL) 500 MG tablet Take 1,000 mg by mouth 3 (three) times a day as needed for pain.      atropine 1 % ophthalmic solution 1 drop at bedtime.     memantine (NAMENDA XR) 7 mg CSpX Take 7 mg by mouth at bedtime.     metoprolol succinate (TOPROL XL) 50 MG 24 hr tablet Take 1.5 tablets (75 mg total) by mouth daily.     nitroglycerin (NITROSTAT) 0.4 MG SL tablet Place 0.4 mg under the tongue as needed for chest pain.     polyethylene glycol (MIRALAX) 17 gram packet Take 17 g by mouth daily as needed.     prednisoLONE acetate (PRED-FORTE) 1 % ophthalmic suspension 1 drop every 3 (three)  hours.     senna (SENOKOT) 8.6 mg tablet Take 2 tablets by mouth daily.            tamsulosin (FLOMAX) 0.4 mg Cp24 Take 1 capsule (0.4 mg total) by mouth daily.     trolamine salicylate (ASPERCREME) 10 % cream Apply 1 application topically every 6 (six) hours as needed.            warfarin (COUMADIN) 2.5 MG tablet Take 0.5-1 tablets (1.25-2.5 mg total) by mouth daily. 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 Followed by [facility].       Vitamin B12 deficiency      Vitiligo            PHYSICAL EXAMINATION  Vitals:    02/26/20 0700   BP: 127/73   Pulse: 66   Resp: 16   Temp: 98.5  F (36.9  C)   SpO2: 98%   Weight: 156 lb (70.8 kg)       Today on physical exam:     GENERAL: Awake, Alert, oriented x3, not in any form of acute distress, answers questions appropriately, follows simple commands, conversant  HEENT: Head is normocephalic with normal hair distribution. No evidence of trauma. Ears: No acute purulent discharge. Eyes: Conjunctivae pink with no scleral jaundice. Nose: Normal mucosa and septum. NECK: Supple with no cervical or supraclavicular lymphadenopathy. Trachea is midline. Glasses, hearing aids, [place]  CHEST: No tenderness or deformity, no crepitus  LUNG: dim to auscultation with good chest expansion. There are no crackles or wheezes, normal AP diameter. No shortness of breath no cough  BACK: chronic lumbar pain, no numb/tingling  CVS: irregularly irregular rhythm, systolic murmur,  2+ pulses symmetric in all extremities.  ABDOMEN: Rounded and soft, nontender to palpation, non  distended, no masses, no organomegaly, good bowel sounds, no rebound or guarding, no peritoneal signs.   EXTREMITIES: No pedal edema, darin discoloration ble  SKIN: Warm and dry,   NEUROLOGICAL: The patient is oriented to person, place and time. Forgetful at times, short term memory issues, no numb/tingling ble.            LABS:   No results found for this or any previous visit (from the past 168 hour(s)).  Results for orders placed or performed during the hospital encounter of 05/07/18   Basic Metabolic Panel   Result Value Ref Range    Sodium 134 (L) 136 - 145 mmol/L    Potassium 4.5 3.5 - 5.0 mmol/L    Chloride 99 98 - 107 mmol/L    CO2 23 22 - 31 mmol/L    Anion Gap, Calculation 12 5 - 18 mmol/L    Glucose 104 70 - 125 mg/dL    Calcium 10.0 8.5 - 10.5 mg/dL    BUN 18 8 - 28 mg/dL    Creatinine 1.12 0.70 - 1.30 mg/dL    GFR MDRD Af Amer >60 >60 mL/min/1.73m2    GFR MDRD Non Af Amer >60 >60 mL/min/1.73m2         Lab Results   Component Value Date    WBC 8.6 05/29/2019    HGB 11.2 (L) 05/29/2019    HCT 35.3 (L) 05/29/2019     05/29/2019     05/29/2019       Lab Results   Component Value Date    [id number] 796 10/02/2018     No results found for: HGBA1C  Lab Results   Component Value Date    INR 2.61 (H) 02/21/2020    INR 4.12 (H) 02/14/2020    INR 2.61 (H) 01/10/2020     No results found for: [id number]  Lab Results   Component Value Date    TSH 2.08 08/04/2015           ASSESSMENT/PLAN:    1. Atrial fibrillation: Rate controlled 60s. No chest pain or palpitations. On metoprolol, warfarin. Anticoagulation clinic manages warfarin.   2. HTN: SBP 120s. On metoprolol. Stable since recent increase in metoprolol.  3. S/p pacemaker: Follows with cardiology, [facility]. No recent concerns.   4. Pulmonary hypertension, Dyspnea: No recent shortness of breath. No chest pain or recent concerns. Follows with cardiology prn.   5. Cognitive impairment, vascular dementia:  on namenda 7mg xr. Very slow  progressive decline, concern for further decline now that his wife has passed. Monitor closely and provide safe environment.   6. H/o prostate cancer: No recent issues. On flomax. Radiation seeding in 2004. Last follow up in December. No concerns today.   7. Lumbar stenosis, chronic back pain: No radiation, no numb/tingling. Tylenol prn, aspercreme prn. Sees chiropractor regularly in facility. No recent pain or concerns.   8. Macular degeneration, intraocular hemorrhage: Follows with Dr. Bill Figueroa. On atropine and prednisolone gtts. No concerns today.   9. H/o CVA: On warfarin, no recent concerns.   10. Constipation: On colace prn and taking prune juice. senna daily prn. Stable recently.   11. Depression, Grief: appears to be coping well today, in good spirits. Appears hygiene is in order today and self cares are still appropriate. Nursing has taken over med management as wife previously did this for him. Will need to monitor for other services he may need now that his wife is no longer able to help him.     Due for labs at next visit    Electronically signed by: Delmi Regan NP

## 2021-06-06 NOTE — TELEPHONE ENCOUNTER
ANTICOAGULATION  MANAGEMENT    Assessment     Today's INR result of 3.14 is Supratherapeutic (goal INR of 2.0-3.0)        Previous INR was Therapeutic; supratherapeutic prior    Warfarin given as previously instructed    No new health/diet changes affecting INR    Interaction between Stopping mutivitamin (likely contained vit K) and warfarin may be affecting INR    Continues to tolerate warfarin with no reported s/s of bleeding or thromboembolism     2/26: Wife passed away; no nutritional concerns noted at this provider visit      Plan:     Warfarin Dosing Orders:  Change warfarin dose to 2.5 mg daily on Wed, Sat; and 1.25 mg daily rest of week  (10 % change)    Next INR: 10 days- on 3/11/20    Telephone orders given to nurse, Alicja.  Orders read back correctly.     Kaitlin Fountain, PharmD    Subjective/Objective:      Raymond Zheng, a 89 y.o. male is on warfarin. Facility nurse reports for Raymond:    Other anticoagulants: No    Medication changes: Yes: DC multivit and B12     Missed warfarin doses since last INR: No     Abnormal bleeding since last INR: No    New symptoms, injury or illness: No    2/26 visit with GERARDO Awad notes patient's wife passed away; notes still eating at dining small for meals; eating fine      Upcoming surgery, procedure or cardioversion: No    Recent INR Results:    Lab Results   Component Value Date    INR 3.14 (H) 03/02/2020    INR 2.61 (H) 02/21/2020    INR 4.12 (H) 02/14/2020       Anticoagulation Episode Summary     Current INR goal:   2.0-3.0   TTR:   73.8 % (1 y)   Next INR check:   3/2/2020   INR from last check:   2.61 (2/21/2020)   Most recent INR:    3.14! (3/2/2020)   Weekly max warfarin dose:      Target end date:      INR check location:      Preferred lab:      Send INR reminders to:   [facility] (TCU/LTC/[facility])    Indications    A-fib (H) (Resolved) [I48.91]           Comments:            Anticoagulation Care Providers     Provider Role Specialty Phone  number    Delmi Regan NP Responsible Nurse Practitioner [phone]

## 2021-06-06 NOTE — TELEPHONE ENCOUNTER
ANTICOAGULATION  MANAGEMENT    Assessment     Today's INR result of 4.12 is Supratherapeutic (goal INR of 2.0-3.0)        Previous INR was Therapeutic    Warfarin given as previously instructed    No new health/diet changes affecting INR    No new medication/supplements affecting INR    Continues to tolerate warfarin with no reported s/s of bleeding or thromboembolism     His wife has been in the hospital since 2/5, therefore patient has been getting meds from AL nurse instead of his wife giving them to him.       Plan:     Warfarin Dosing Orders:  Hold today then continue current warfarin dose    2.5 mg every Mon, Wed, Fri; 1.25 mg all other days       Next INR: 1 week.     Telephone orders given to nurseAlicja.  Orders read back correctly.     Boo Quesada RN    Subjective/Objective:      Raymond Zheng, a 89 y.o. male is on warfarin. Facility nurse reports for Raymond:    Other anticoagulants: No    Medication changes: No     Missed warfarin doses since last INR: No     Abnormal bleeding since last INR: No    New symptoms, injury or illness: No     Upcoming surgery, procedure or cardioversion: No    Recent INR Results:    Lab Results   Component Value Date    INR 4.12 (H) 02/14/2020    INR 2.61 (H) 01/10/2020    INR 2.75 (H) 12/13/2019       Anticoagulation Episode Summary     Current INR goal:   2.0-3.0   TTR:   75.9 % (1 y)   Next INR check:   2/21/2020   INR from last check:   4.12! (2/14/2020)   Weekly max warfarin dose:      Target end date:      INR check location:      Preferred lab:      Send INR reminders to:   [facility] (TCU/LTC/[facility])    Indications    A-fib (H) (Resolved) [I48.91]           Comments:            Anticoagulation Care Providers     Provider Role Specialty Phone number    Delmi Regan NP Responsible Nurse Practitioner [phone]

## 2021-06-07 NOTE — TELEPHONE ENCOUNTER
ANTICOAGULATION  MANAGEMENT    Assessment     Today's INR result of 2.61 is Therapeutic (goal INR of 2.0-3.0)        Previous INR was Therapeutic    Warfarin given as previously instructed    No new health/diet changes affecting INR    No new medication/supplements affecting INR    Continues to tolerate warfarin with no reported s/s of bleeding or thromboembolism       Plan:     Warfarin Dosing Orders:  Continue current warfarin dose    2.5 mg every Wed, Sat; 1.25 mg all other days         Next INR: 4 weeks.    Telephone orders given to nurseAlicja.  Orders read back correctly.     Boo Quesada RN    Subjective/Objective:      Raymond Zheng, a 89 y.o. male is on warfarin. Facility nurse reports for Raymond:    Other anticoagulants: No    Medication changes: No     Missed warfarin doses since last INR: No     Abnormal bleeding since last INR: No    New symptoms, injury or illness: No     Upcoming surgery, procedure or cardioversion: No    Recent INR Results:    Lab Results   Component Value Date    INR 2.61 (H) 03/25/2020    INR 2.83 (H) 03/11/2020    INR 3.14 (H) 03/02/2020       Anticoagulation Episode Summary     Current INR goal:   2.0-3.0   TTR:   77.5 % (1 y)   Next INR check:   4/22/2020   INR from last check:   2.61 (3/25/2020)   Weekly max warfarin dose:      Target end date:      INR check location:      Preferred lab:      Send INR reminders to:   [facility] (TCU/LTC/[facility])    Indications    A-fib (H) (Resolved) [I48.91]           Comments:            Anticoagulation Care Providers     Provider Role Specialty Phone number    Delmi Regan NP Responsible Nurse Practitioner [phone]

## 2021-06-07 NOTE — TELEPHONE ENCOUNTER
ANTICOAGULATION  MANAGEMENT    Assessment     Today's INR result of 2.33 is Therapeutic (goal INR of 2.0-3.0)        Previous INR was Therapeutic    Warfarin given as previously instructed    No new health/diet changes affecting INR    No new medication/supplements affecting INR    Continues to tolerate warfarin with no reported s/s of bleeding or thromboembolism       Plan:     Warfarin Dosing Orders:  Continue current warfarin dose    2.5 mg every Wed, Sat; 1.25 mg all other days         Next INR: 4 weeks.     Telephone orders given to nurseManny.  Orders read back correctly.     Boo Quesada RN    Subjective/Objective:      Raymond Zheng, a 89 y.o. male is on warfarin. Facility nurse reports for Raymond:    Other anticoagulants: No    Medication changes: No     Missed warfarin doses since last INR: No     Abnormal bleeding since last INR: No    New symptoms, injury or illness: No     Upcoming surgery, procedure or cardioversion: No    Recent INR Results:    Lab Results   Component Value Date    INR 2.33 (H) 04/22/2020    INR 2.61 (H) 03/25/2020    INR 2.83 (H) 03/11/2020       Anticoagulation Episode Summary     Current INR goal:   2.0-3.0   TTR:   81.5 % (1 y)   Next INR check:   5/20/2020   INR from last check:   2.33 (4/22/2020)   Weekly max warfarin dose:      Target end date:      INR check location:      Preferred lab:      Send INR reminders to:   [facility] (TCU/LTC/[facility])    Indications    A-fib (H) (Resolved) [I48.91]           Comments:            Anticoagulation Care Providers     Provider Role Specialty Phone number    Delmi Regan NP Responsible Nurse Practitioner [phone]

## 2021-06-08 NOTE — TELEPHONE ENCOUNTER
ANTICOAGULATION  MANAGEMENT    Assessment     Today's INR result of 3.38 is Supratherapeutic (goal INR of 2.0-3.0)        Previous INR was Therapeutic    Warfarin given as previously instructed    No new health/diet changes affecting INR    No new medication/supplements affecting INR    Continues to tolerate warfarin with no reported s/s of bleeding or thromboembolism       Plan:     Warfarin Dosing Orders:  Change warfarin dose to 2.5 mg daily on Sat; and 1.25 mg daily rest of week  (11 % change)    Next INR: 2 weeks.    Telephone orders given to nurseSeema.  Orders read back correctly.     Boo Quesada RN    Subjective/Objective:      Raymond Zheng, a 89 y.o. male is on warfarin. Facility nurse reports for Raymond:    Other anticoagulants: No    Medication changes: No     Missed warfarin doses since last INR: No     Abnormal bleeding since last INR: No    New symptoms, injury or illness: No     Upcoming surgery, procedure or cardioversion: No    Recent INR Results:    Lab Results   Component Value Date    INR 3.38 (H) 05/20/2020    INR 2.33 (H) 04/22/2020    INR 2.61 (H) 03/25/2020       Anticoagulation Episode Summary     Current INR goal:   2.0-3.0   TTR:   82.8 % (1 y)   Next INR check:   6/3/2020   INR from last check:   3.38! (5/20/2020)   Weekly max warfarin dose:      Target end date:      INR check location:      Preferred lab:      Send INR reminders to:   [facility] (TCU/LTC/[facility])    Indications    A-fib (H) (Resolved) [I48.91]           Comments:            Anticoagulation Care Providers     Provider Role Specialty Phone number    Delmi Regan NP Responsible Nurse Practitioner [phone]

## 2021-06-08 NOTE — PROGRESS NOTES
[facility]    DATE: 2020    NAME:  Raymond Zheng             :  1925  MRN: [id number]  CODE STATUS:  POLST on file    VISIT TYPE: Problem Visit (crackles, edema)     FACILITY:  [facility] [[id number]]       CHIEF COMPLAIN/REASON FOR VISIT:    Chief Complaint   Patient presents with     Problem Visit     crackles, edema               HISTORY OF PRESENT ILLNESS: Raymond Zheng is a 89 y.o. male who is a resident of [facility]. He has PMH of A fib, CVA, prostate cancer, HTN, knee osteoarthritis, low back pain, lumbar stenosis, pacemaker, vit b12 deficiency, memory issues, mild pulmonary HTN, dependent edema, dyspnea.     Today Mr. Zheng is seen for concerns of crackles and edema. He is seen in his apartment alone today. He says that he has been feeling fine. His legs are still swollen and he is not sure if they have gotten worse or better recently. His breathing seems fine and he denies feeling short of breath. He does cough sometimes but says it sounds like a smokers cough. He doesn't think he usually brings anything up. He is not having any fevers, sore, throat, bowel concerns. He does urinate frequently but no other urinary issues. He says he sits in his recliner most of the day and he says he tries to elevate his legs but is not wearing stockings. He says he cannot put these on by himself. He denies dizziness or other problems. He thinks they are weighing him but not sure. On review of his chart in room he had one weight on  but no others this month and this was 142lbs, which does not correlate with previous weights of 171lbs at last visit. He is noted to have pitting edema and crackles on exam. We reviewed that he needs to be weighed weekly and he can help remind staff of this. We discussed he is fluid overloaded and he did agree to changing his medications to help with this.     REVIEW OF SYSTEMS:  PROBLEMS AND REVIEW OF  SYSTEMS:   Review of Systems  Today on ROS:   Currently, no fever, chills, or rigors. Decreased vision and hearing. Denies any chest pain, headaches, palpitations, lightheadedness, dizziness. Appetite is good. Denies any GERD symptoms. Denies any difficulty with swallowing, nausea, or vomiting.  Denies any abdominal pain, diarrhea or constipation. Denies any urinary symptoms. No insomnia.  Positive for forgetfulness, ambulates with walker,  no recent back pain, nursing manages and administers meds, leg swelling, confusion, denies shortness of breath but appears shortness of breath when ambulating      Allergies   Allergen Reactions     Ciprofloxacin      Erythromycin Base      Oxycodone-Acetaminophen      Sulfa (Sulfonamide Antibiotics)      Current Outpatient Medications   Medication Sig     acetaminophen (TYLENOL) 500 MG tablet Take 1,000 mg by mouth 3 (three) times a day as needed for pain.      furosemide (LASIX) 20 MG tablet Take 20 mg by mouth daily.      memantine (NAMENDA XR) 7 mg CSpX 1 CAP BY MOUTH EVERY BEDTIME (DX: MEMORY LOSS)     metoprolol succinate (TOPROL XL) 50 MG 24 hr tablet Take 1.5 tablets (75 mg total) by mouth daily.     nitroglycerin (NITROSTAT) 0.4 MG SL tablet Place 0.4 mg under the tongue as needed for chest pain.     polyethylene glycol (MIRALAX) 17 gram packet Take 17 g by mouth daily as needed.     potassium chloride (KLOR-CON) 10 MEQ CR tablet Take 10 mEq by mouth daily.      SENEXON-S 8.6-50 mg tablet 2 TABS BY MOUTH EVERY BEDTIME     senna (SENOKOT) 8.6 mg tablet Take 2 tablets by mouth at bedtime.      trolamine salicylate (ASPERCREME) 10 % cream Apply 1 application topically every 6 (six) hours as needed.            warfarin (COUMADIN) 2.5 MG tablet Take 0.5-1 tablets (1.25-2.5 mg total) by mouth daily. 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 Followed by [facility].       Vitamin B12 deficiency      Vitiligo            PHYSICAL EXAMINATION  There were no vitals filed for this visit.    Today on physical exam:     GENERAL: Awake, Alert, not in any form of acute distress, answers questions appropriately, follows simple commands, conversant, pleasant but less  than usual  HEENT: Head is normocephalic with normal hair distribution. No evidence of trauma. Ears: No acute purulent discharge. Eyes: Conjunctivae pink with no scleral jaundice. Nose: Normal mucosa and septum. NECK: Supple with no cervical or supraclavicular lymphadenopathy. Trachea is midline. Glasses, hearing aids, [place]  CHEST: No tenderness or deformity, no crepitus  LUNG: dim with crackles to bilateral bases to auscultation with good chest expansion. There are no crackles or wheezes, normal AP diameter. No cough noted, shortness of breath not noted at rest but noted with exertion  BACK: chronic lumbar pain but denies today, no radiation, no numb/tingling, ,moderate kyphosis  CVS: irregularly irregular rhythm, systolic murmur,  2+ pulses symmetric in all extremities.  ABDOMEN: Rounded and soft, nontender to palpation, non distended, no masses, no organomegaly, good bowel sounds, no rebound or guarding, no peritoneal signs.   EXTREMITIES: darin discoloration ble, 2+ ble pitting edema  SKIN: Warm and dry,   NEUROLOGICAL: The patient is oriented to person, place and time. Confused at times Forgetful at times, no numb/tingling ble.  More flat affect than baseline            LABS:   Recent Results (from the past 168 hour(s))   INR   Result Value Ref Range    INR 2.38 (H) 0.90 - 1.10     Results for  orders placed or performed in visit on 06/10/20   Basic Metabolic Panel   Result Value Ref Range    Sodium 143 136 - 145 mmol/L    Potassium 3.7 3.5 - 5.0 mmol/L    Chloride 108 (H) 98 - 107 mmol/L    CO2 25 22 - 31 mmol/L    Anion Gap, Calculation 10 5 - 18 mmol/L    Glucose 78 70 - 125 mg/dL    Calcium 9.3 8.5 - 10.5 mg/dL    BUN 27 8 - 28 mg/dL    Creatinine 1.23 0.70 - 1.30 mg/dL    GFR MDRD Af Amer >60 >60 mL/min/1.73m2    GFR MDRD Non Af Amer 55 (L) >60 mL/min/1.73m2         Lab Results   Component Value Date    WBC 8.6 05/29/2019    HGB 11.2 (L) 05/29/2019    HCT 35.3 (L) 05/29/2019     05/29/2019     05/29/2019       Lab Results   Component Value Date    UPAFXICT97 796 10/02/2018     No results found for: HGBA1C  Lab Results   Component Value Date    INR 2.38 (H) 06/17/2020    INR 2.20 (H) 06/04/2020    INR 3.38 (H) 05/20/2020     No results found for: [id number]  Lab Results   Component Value Date    TSH 2.08 08/04/2015           ASSESSMENT/PLAN:    Acute CHF, fluid overload: 2+ ble pitting edema, h/o pulmonary htn, intermittent dyspnea. shortness of breath with exertion, crackles. Weekly weights ordered but most recent weight does not correlate. Last weight was 171lbs at last visit. Discussed with him and staff importance of weighing at least once weekly. Edema remains much worse than baseline, change lasix to 20mg daily, kcl daily (previously every other day). Bmp on 6/24 as previously had SARITA with lasix. Will gently diurese. Continue to encourage stockings, although has been refusing. Elevate legs.   Atrial fibrillation:. No chest pain or palpitations. On metoprolol, warfarin. Anticoagulation clinic manages warfarin. stable  HTN: SBP. On metoprolol. Stable since recent increase in metoprolol.  S/p pacemaker: Follows with cardiology, Yu Rong. No recent concerns.   Cognitive impairment, vascular dementia:  on namenda 7mg xr. Slow progressive decline, more flat affect and less   today than usual.   H/o prostate cancer: No recent issues. On flomax. Radiation seeding in 2004. Last follow up in December. No concerns today. Dc flomax risk v benefit. Med reductions.  Lumbar stenosis, chronic back pain: No radiation, no numb/tingling. Tylenol prn, aspercreme prn. Sees chiropractor regularly in facility. No recent pain or concerns.   Macular degeneration, intraocular hemorrhage: Follows with Dr. Bill Figueroa. On atropine and prednisolone gtts. No concerns today.   H/o CVA: On warfarin, no recent concerns.   Constipation: On colace prn and taking prune juice. senna daily prn. Stable recently.   Weights: 159lbs-171lbs.  Needs to be weighed weekly.       Electronically signed by: Delmi Regan NP

## 2021-06-08 NOTE — TELEPHONE ENCOUNTER
[facility] Nurse Triage Telephone Note      Provider: ANABELA Contreras  Facility: [facility]    Facility Type: [facility]    Caller: Alicja   Call Back Number:  [phone]    Allergies: Ciprofloxacin; Erythromycin base; Oxycodone-acetaminophen; and Sulfa (sulfonamide antibiotics)    Reason for call: BMP done d/t increase in LE edema and bump in lasix to 40mg daily x5 days.  Creat 1.32, GFR 51 and BUN 31  LE edema down to 1.5-2+ pitting edema. Pt has new order for YOLI or Tubi  and the pt used them x1 then threw them away saying that they were too tight. Pharmacy is not able to get tubi .  No shortness of breath, o2 sats 99% on ra.   On K+ 10meq daily. And now on lasix 20mg daily.      Verbal Order/Direction given by Provider: BMP in 1 week. Possibly assess to see if pt can get the tubi  from OT or medical supply company.     Provider giving order: VLAD Levy    Verbal order given to: Alicja Jules, RN

## 2021-06-08 NOTE — TELEPHONE ENCOUNTER
ANTICOAGULATION  MANAGEMENT    Assessment     Today's INR result of 2.20 is Therapeutic (goal INR of 2.0-3.0)        Previous INR was Supratherapeutic    Warfarin given as previously instructed    No new health/diet changes affecting INR    No new medication/supplements affecting INR    Continues to tolerate warfarin with no reported s/s of bleeding or thromboembolism       Plan:     Warfarin Dosing Orders:  Continue current warfarin dose 2.5 mg daily on Sat; and 1.25 mg daily rest of week  (0 % change)    Next INR: 2 weeks.     Telephone orders given to nurseAlicaj.  Orders read back correctly.     Boo Quesada RN    Subjective/Objective:      Raymond Zheng, a 89 y.o. male is on warfarin. Facility nurse reports for Raymond:    Other anticoagulants: No    Medication changes: No     Missed warfarin doses since last INR: No     Abnormal bleeding since last INR: No    New symptoms, injury or illness: No     Upcoming surgery, procedure or cardioversion: No    Recent INR Results:    Lab Results   Component Value Date    INR 2.20 (H) 06/04/2020    INR 3.38 (H) 05/20/2020    INR 2.33 (H) 04/22/2020       Anticoagulation Episode Summary     Current INR goal:   2.0-3.0   TTR:   84.4 % (1 y)   Next INR check:   6/17/2020   INR from last check:   2.20 (6/4/2020)   Weekly max warfarin dose:      Target end date:      INR check location:      Preferred lab:      Send INR reminders to:   [facility] (TCU/LTC/[facility])    Indications    A-fib (H) (Resolved) [I48.91]           Comments:            Anticoagulation Care Providers     Provider Role Specialty Phone number    Delmi Regan NP Responsible Nurse Practitioner [phone]

## 2021-06-08 NOTE — PROGRESS NOTES
Assessment:     Diagnoses and all orders for this visit:    Arthropathy of lumbar facet joint    Chronic bilateral low back pain without sciatica       Raymond Zheng is a 89 y.o. y.o. male with past medical history significant for atrial fibrillation with cardiac pacemaker and on Coumadin therapy, prostate cancer, vitamin 12 deficiency, hypertension, CVA, knee osteoarthritis who who presents today for follow-up regarding one-month post bilateral L2, L3, L4 radiofrequency ablation in which she received about 70% relief of his bilateral low back pain and feels much better at this time however does have some midline low back pain with certain movements, this is tolerable he reports.    We did review his lumbar spine MRI and he does have multiple degenerative changes including spondylolisthesis at L1-2, L2-3, L3-4, L5-S1. He has old osteoporotic compression fracture L4-5 and L5-S1.  Also has mild central canal stenosis at L4-5 and L2-3 as well as multilevel foraminal stenosis.  He does have severe facet arthropathy at L3-4, L4-5, L5-S1, moderate at L2-3.     Plan:     A shared decision making plan was used. The patient's values and choices were respected. Prior medical records from 12/1/2016 were reviewed today. The following represents what was discussed and decided upon by the provider and the patient.        -DIAGNOSTIC TESTS: Reviewed lumbar CT scan and flexion extension x-ray with patient again today    -MEDICATIONS: Advised patient to continue Olayinka Ce as tolerated.   Discussed side effects of medications and proper use. Patient verbalized understanding.    -PHYSICAL THERAPY: Encouraged patient to continue home exercise program on a daily basis as well ongoing to prevent future flareups of his pain.  Discussed the importance of core strengthening, ROM, stretching exercises with the patient and how each of these entities is important in decreasing pain.  Explained to the patient that the purpose of physical  therapy is to teach the patient a home exercise program.  These exercises need to be performed every day in order to decrease pain and prevent future occurrences of pain.        -PATIENT EDUCATION:  20 minutes of total visit time was spent face to face with the patient today, 60 % of the visit was spent on counseling, education, and coordinating care.     -FOLLOW UP: Follow-up as needed.   Advised to contact clinic if symptoms worsen or change.    Subjective:     Raymond Zheng is a 89 y.o. male who presents today for follow-up regarding post bilateral L2, L3, L4 radiofrequency ablation in which he did receive about any percent relief of his bilateral low back pain which currently reviewed ports as a 2/10 at this time and significantly improved, however he still has some pain with certain movements.  Denies radicular pain at this time.  He reports that he is very happy with the injection and feels he is able to dance more at this time as well which is his favorite past time.    Evaluation to Date: Severe facet arthropathy L3-4, L4-5, L5-S1. Spondylolisthesis multilevel as well as multilevel foraminal stenosis. Mild central canal stenosis L4-5 and L2-3.   Prior lumbar laminectomy at L3-4 and L4-5, as well as L2-3.      Treatment to Date: Lumbar surgery ×2 in 1980s.  Right shoulder bursa injection with Dr. Sipple 7/10/2015.  Currently not taking any medications for his back pain.  Physical therapy ×2 sessions [facility] 11/18/2016, patient reports still doing home exercise program daily.    Bilateral L3-4 and L4-5 facet joint steroid injection 10/19/2016 with 90% relief chronic low back pain.  Prescribed hydrocodone 11/29/2016 22 acute low back pain per Dr. Chase, however he reports it does not give him much relief.  Patient reports typically he does not like to take extra medications.  Bilateral L2, L3, L4 radiofrequency ablation 1/4/2017 with significant relief he reports that about 70% of his bilateral  low back pain.     **Patient is taking Coumadin daily.    Patient Active Problem List   Diagnosis     Prostate cancer - 2002 - [place] [facility] - Treated with radiation brachytherapy. Followed by [facility].     Pacemaker     Atrial fibrillation     Vitamin B12 deficiency     Knee osteoarthritis     CVA (cerebral infarction)     HTN (hypertension)     Former smoker     DNR (do not resuscitate)       Current Outpatient Prescriptions on File Prior to Encounter   Medication Sig Dispense Refill     cyanocobalamin (VITAMIN B-12) 1000 MCG tablet Take 1,000 mcg by mouth daily.       digoxin (LANOXIN) 125 mcg tablet TAKE ONE TABLET BY MOUTH DAILY NEEDS TO BE SEEN FOR FURTHER REFILLS. 90 tablet 1     diltiazem (CARDIZEM CD) 180 MG 24 hr capsule TAKE ONE CAPSULE BY MOUTH ONE TIME DAILY 90 capsule 3     FOLIC ACID/MV,FE,MIN (CENTRUM ORAL) Take by mouth.       furosemide (LASIX) 40 MG tablet Take 20 mg by mouth daily.       levoFLOXacin (LEVAQUIN) 500 MG tablet Take 1 tablet (500 mg total) by mouth daily for 14 days. CLARIFICATION. 14 tablet 0     metoprolol succinate (TOPROL-XL) 25 MG TAKE ONE TABLET BY MOUTH ONE TIME DAILY 90 tablet 2     nitroglycerin (NITROSTAT) 0.4 MG SL tablet Place 0.4 mg under the tongue as needed for chest pain.       tamsulosin (FLOMAX) 0.4 mg Cp24 Take 1 capsule (0.4 mg total) by mouth daily. 30 capsule 1     warfarin (COUMADIN) 2.5 MG tablet TAKE 1 TABLET BY MOUTH EVERY SUN, TUES AND THURS AND 1/2 TABLET ON ALL OTHER DAYS or take as directed per INR results 30 tablet 3     gabapentin (NEURONTIN) 100 MG capsule Take 100-200 mg by mouth bedtime as needed. 30 capsule 3     No current facility-administered medications on file prior to encounter.        Allergies   Allergen Reactions     Ciprofloxacin      Erythromycin Base      Oxycodone-Acetaminophen      Sulfa (Sulfonamide Antibiotics)        Past Medical History   Diagnosis Date     Atrial fibrillation      CVA (cerebral infarction)      DNR (do not  resuscitate) 10/18/2016     Former smoker      HTN (hypertension)      Knee osteoarthritis      Pacemaker      Prostate cancer      Treated with radiation brachytherapy. Followed by [facility].       Vitamin B12 deficiency      Vitiligo         Review of Systems  ROS:  Specifically negative for bowel/bladder dysfunction, balance changes, headache, dizziness, foot drop, fevers, chills, appetite changes, nausea/vomiting, unexplained weight loss. Otherwise 13 systems reviewed are negative. Please see the patient's intake questionnaire from today for details.    Reviewed Social, Family, Past Medical and Past Surgical history with patient, no significant changes noted since prior visit.     Objective:     Visit Vitals     /77 (Patient Site: Left Arm, Patient Position: Sitting)     Pulse 74     Temp 97.6  F (36.4  C) (Oral)     Wt 153 lb (69.4 kg)     SpO2 98%     BMI 22.76 kg/m2       PHYSICAL EXAMINATION:    --CONSTITUTIONAL: Well developed, well nourished, healthy appearing individual.  --PSYCHIATRIC: Appropriate mood and affect. No difficulty interacting due to temper, social withdrawal, or memory issues.  --SKIN: Lumbar region is dry and intact. Sensation to light touch is intact in the bilateral L4, L5, and S1 dermatomes.  --RESPIRATORY: Normal rhythm and effort. No abnormal accessory muscle breathing patterns noted.   --MUSCULOSKELETAL:  Normal lumbar lordosis noted, no lateral shift.  --GROSS MOTOR: Easily arises from a seated position.   --LUMBAR SPINE:  Inspection reveals no evidence of deformity. Range of motion is not limited in lumbar flexion, extension, or lateral rotation. No tenderness to palpation.   --NEUROLOGIC: Bilateral patellar and achilles reflexes are physiologic and symmetric. Lower extremities are intact to light touch.     RESULTS:   Imaging: CT scan of the lumbar spine was reviewed today. The images were shown to the patient and the findings were explained using a spine model.    Ct  Abdomen Pelvis Without Oral With Iv Contrast  Result Date: 11/30/2016  CT ABDOMEN PELVIS WO ORAL W IV CONTRAST 11/30/2016 4:27 PM INDICATION: abdominal pain and bloating TECHNIQUE: CT abdomen and pelvis. Multiplanar reformation images (MPR). Dose reduction techniques were used. IV CONTRAST: Iohexol (Omni) 75mL COMPARISON: None. FINDINGS: LUNG BASES: Lung bases are clear. There is mild cardiomegaly without cardiac pacemaker in place. ABDOMEN: There are multiple small stones dependently in the gallbladder lumen. No biliary dilatation. No common duct stones are identified. The spleen, adrenal glands are normal. There is pancreatic atrophy with fatty replacement of the pancreatic head. No abnormal pancreatic mass. There are bilateral renal cysts, left larger than right the largest left renal cyst measures 3.8 cm in diameter. There is mild prominence of the left extrarenal pelvis no left renal or ureteral stones or ureteral dilatation. No right renal stones or solid masses. There is no evidence for bowel obstruction or free intraperitoneal air. Patient is status post anterior abdominal wall repair with multiple surgical clips. No recurrent hernia is seen. There is calcification of the abdominal aorta and renal artery origins end of the superior mesenteric artery origin. No aneurysm. No evidence for appendicitis. There is no abdominal lymphadenopathy. PELVIS: There are brachial therapy seeds in the prostate. No evidence for diverticulitis or abscess. No bowel containing hernias no pelvic lymphadenopathy. MUSCULOSKELETAL: There is lumbar scoliosis convex to the left with multilevel degenerative disc disease. There is mild compression of superior endplates of L4 and L5 which appears chronic no acute fractures are identified. Findings were called to Dr. Gamez at 1643 hours on 11/30/2016.   CONCLUSION: 1. No evidence for bowel obstruction, appendicitis, diverticulitis or abscess. 2. Bilateral renal cysts. 3. Arterial  calcification without aneurysm. 4. Cholelithiasis. 5. The cause of the patient's symptoms is not evident on this examination.     XR LUMBAR SPINE FLEX AND EXT 2 OR 3 VWS  10/17/2016   INDICATION: Spondylolisthesis, evaluate stability.COMPARISON: CT 10/11/2016.  FINDINGS: Flexion-extension views shows mild retrolisthesis of L1 on L2, L2 on L3 and L3 on L4. This is in extension.   These slightly reduce to normal alignment at the L1-2 and L2-3 levels with flexion. The L3-L4 level does not change in flexion. Flexion-extension views otherwise unremarkable. Stable chronic compression fracture inferior endplate of L1.     Ct Lumbar Spine Without Contrast  Result Date: 10/11/2016  [facility] CT LUMBAR SPINE WO CONTRAST 10/11/2016, 12:03 PM INDICATION: Low back pain. TECHNIQUE: Helical thin-section CT scan of the spine was performed using bone reconstruction algorithm. From the axial source data, sagittal and coronal thin reformats. Dose reduction techniques were used. IV CONTRAST: None. COMPARISON: None. FINDINGS: Five lumbar-type vertebral bodies. Mild thoracolumbar kyphosis. Slightly exaggerated lumbar lordosis. Degenerative left convex scoliosis measuring 28 degrees between L2 and L5. No fracture or destructive bony change. Paraspinous muscle volume is normal. Moderate aortic calcification, normal caliber. No visible adenopathy. Moderate degenerative change SI joints.   T12-L1: Mild disc height loss. Ventral interbody spurring. Moderate facet arthropathy. No central canal or foraminal stenosis. L1-L2: 3-mm retrolisthesis. 4-mm right subluxation. Moderate to marked disc height loss. Gas in the disc space. Moderate circumferential disc bulge and interbody spur. Moderate facet arthropathy. Facet diastases. No central canal stenosis. Mild to moderate right foraminal narrowing. Moderate left foraminal stenosis from retrolisthesis. L2-L3: 3-mm retrolisthesis. 5-mm right subluxation. Mild disc height loss. Gas in the  disc space. Mild to moderate circumferential interbody spur and disc bulge, asymmetric to the right. Previous wide laminectomy. Moderately advanced facet arthropathy. Mild central canal stenosis at the superior aspect of the disc space. Moderate bilateral foraminal stenoses. L3-L4: 4-mm retrolisthesis. Marked right-sided disc height loss. Old superior endplate osteoporotic impaction. Severe facet arthropathy. Wide laminectomy. No central canal stenosis. Moderately severe bilateral foraminal stenoses. L4-L5: Osteoporotic superior endplate impaction, which is old. Ballooning of the disc. Moderate to marked right-sided disc space narrowing with some gas in the disc space. Marked right-sided osteophytic spurring. Moderate ventral spurring. Severe facet arthropathy with facet diastases. Wide laminectomy. Mild central canal stenosis. Moderate right foraminal stenosis. Mild to moderate left foraminal narrowing. L5-S1: 3-mm anterolisthesis. 3-mm left subluxation. Moderate disc height loss with gas in the disc space. Circumferential interbody spurring, which is more prominent on the left. Severe facet arthropathy. Partial laminectomy. No central canal stenosis. Mild right foraminal narrowing. Moderately severe left foraminal stenosis.   CONCLUSION: 1. Old osteoporotic endplate depressions of superior L4 and L5 endplates. No definite acute fracture. 2. Advanced degenerative changes throughout the lumbar spine. Previous multilevel laminectomy. 3. Mild central canal stenosis at L4-L5 and L2-L3. 4. Multilevel foraminal stenoses, as described.

## 2021-06-08 NOTE — TELEPHONE ENCOUNTER
[facility] Nurse Triage Telephone Note      Provider: ANABELA Contreras  Facility: [facility]    Facility Type: [facility]    Caller: Alicja  Call Back Number:  [phone]    Allergies: Ciprofloxacin; Erythromycin base; Oxycodone-acetaminophen; and Sulfa (sulfonamide antibiotics)    Reason for call: BMP results- d/t short term increase in lasix to 40mg daily x 5 and now on lasix 20mg daily and K+ 10meq daily. Edema is a little better and using his tubi .      Verbal Order/Direction given by Provider: decrease the lasix to 20mg every other day and K+10meq every other day. BMP 6/10    Provider giving order: ANABELA Contreras    Verbal order given to: Alicja Jules RN

## 2021-06-08 NOTE — PROGRESS NOTES
Post injection F/U, doing good  --1/4/17 B/L L2, L3, L4 RF  --50% relief per pt  --C/O mid low back pain only with certain movements  --Rates pain 2/10  --PT x 2 sessions [facility] 11/18/14 for back     Medication  --No pain meds

## 2021-06-08 NOTE — PROGRESS NOTES
ASSESSMENT:  1. Pelvic pain  Urinalysis-UC if Indicated    levoFLOXacin (LEVAQUIN) 500 MG tablet     Results for orders placed or performed in visit on 01/07/17   Urinalysis-UC if Indicated   Result Value Ref Range    Color, UA Yellow Colorless, Yellow, Straw, Light Yellow    Clarity, UA Clear Clear    Glucose, UA Negative Negative    Bilirubin, UA Negative Negative    Ketones, UA Negative Negative    Specific Gravity, UA 1.015 1.002 - 1.030    Blood, UA Negative Negative    pH, UA 5.5 4.5 - 8.0    Protein, UA 30 mg/dL (!) Negative mg/dL    Urobilinogen, UA 0.2 E.U./dL 0.2 E.U./dL, 1.0 E.U./dL    Nitrite, UA Negative Negative    Leukocytes, UA Negative Negative    Bacteria, UA None Seen None Seen hpf    RBC, UA 0-2 None Seen, 0-2 hpf    WBC, UA 0-5 None Seen, 0-5 hpf    Squam Epithel, UA 0-5 None Seen, 0-5 lpf     Etiology of this is not clear. No evidence of UTI on UA. Very minimal pain on exam. No hernia present. No prostate bogginess or pain with palpation. No need for repeat or urgent scans today. Will tx for a chronic prostatitis as he reports such similar symptoms that responded well to Levaquin. Instructed that f/u with PCP at the end of tx is needed. Warning s/s of when to return discussed.     PLAN:  Levoquin prescribed  Push fluids  Monitor symptoms, if not improving return to clinic for further evaluation  Schedule an appt with Dr. Marie in 2 wks for recheck    SUBJECTIVE:   Raymond Zheng is a 89 y.o. male presents with his wife for 2 days complaint of central pelvic pain and has been unchanged. He has had ongoing issues with pelvic pain and urinary symptoms for the past 2 months. He has a hx of right inguinal hernia repair and prostate cancer. His PSA was recently elevated. He was tx for a UTI with augmentin intially after neg CT on 11/30/16. This provided some relief but then f/u with PCP for ongoing pain & had neg abd xray and improved UA, was thought it may be prostatitis. Was tx with levoquin x 2 wks  with resolution of the pain.. He had been symptoms free but then pain very similar to the previous pain returned 2 days ago. Denies urinary frequency, hematuria, fever, chills, flank pain on bilateral, nausea, vomiting and diarrhea, constipation.  No history of pyelonephritis or kidney stones.  He rates the pain as a 2-3/10 aching pain, he has not taken anything for pain. He has been packing up some things at home recently but denies any heavy lifting or strenuous exercise.     Past Medical History   Diagnosis Date     Atrial fibrillation      CVA (cerebral infarction)      DNR (do not resuscitate) 10/18/2016     Former smoker      HTN (hypertension)      Knee osteoarthritis      Pacemaker      Prostate cancer      Treated with radiation brachytherapy. Followed by [facility].       Vitamin B12 deficiency      Vitiligo        Current Medications:  Current Outpatient Prescriptions on File Prior to Visit   Medication Sig Dispense Refill     cholecalciferol, vitamin D3, (VITAMIN D3) 400 unit cap Take by mouth.       digoxin (LANOXIN) 125 mcg tablet TAKE ONE TABLET BY MOUTH DAILY NEEDS TO BE SEEN FOR FURTHER REFILLS. 90 tablet 1     diltiazem (CARDIZEM CD) 180 MG 24 hr capsule TAKE ONE CAPSULE BY MOUTH ONE TIME DAILY 90 capsule 3     furosemide (LASIX) 40 MG tablet Take 20 mg by mouth daily.       metoprolol succinate (TOPROL-XL) 25 MG TAKE ONE TABLET BY MOUTH ONE TIME DAILY 90 tablet 2     warfarin (COUMADIN) 2.5 MG tablet TAKE 1 TABLET BY MOUTH EVERY SUN, TUES AND THURS AND 1/2 TABLET ON ALL OTHER DAYS or take as directed per INR results 30 tablet 3     gabapentin (NEURONTIN) 100 MG capsule Take 100-200 mg by mouth bedtime as needed. 30 capsule 3     nitroglycerin (NITROSTAT) 0.4 MG SL tablet Place 0.4 mg under the tongue as needed for chest pain.       tamsulosin (FLOMAX) 0.4 mg Cp24 Take 1 capsule (0.4 mg total) by mouth daily. 30 capsule 1     No current facility-administered medications on file prior to visit.         Allergies:  Allergies   Allergen Reactions     Ciprofloxacin      Erythromycin Base      Oxycodone-Acetaminophen      Sulfa (Sulfonamide Antibiotics)        OBJECTIVE:  Visit Vitals     /70     Pulse 78     Temp 97.6  F (36.4  C) (Oral)     Resp 16     Wt 159 lb 4.8 oz (72.3 kg)     SpO2 98%     BMI 23.7 kg/m2       Physical exam reveals a pleasant 89 y.o. male   Appears healthy, alert and cooperative  Lungs: Chest is clear, no wheezing or rales. Symmetric air entry throughout both lung fields.  Cardiac: regular rate and rhythm, no murmur rub or gallop  Abdomen: soft, no hepatosplenomegaly. Very mild tenderness in right pelvic area with palpation. No CVA tenderness  Genitalia: No testicular pain, swelling or redness. No penile lesions or discharge. No inguinal hernia present.  Rectal: Normal sphincter tone. No prostate bogginess or tenderness with palpation.

## 2021-06-08 NOTE — TELEPHONE ENCOUNTER
ANTICOAGULATION  MANAGEMENT    Assessment     Today's INR result of 2.38 is Therapeutic (goal INR of 2.0-3.0)        Previous INR was Therapeutic    Warfarin given as previously instructed    No new health/diet changes affecting INR    No new medication/supplements affecting INR    Continues to tolerate warfarin with no reported s/s of bleeding or thromboembolism       Plan:     Warfarin Dosing Orders:  Continue current warfarin dose    2.5 mg every Sat; 1.25 mg all other days         Next INR: 4 weeks.     Telephone orders given to nurseSylvia.  Orders read back correctly.     Boo Quesada RN    Subjective/Objective:      Raymond Zheng, a 89 y.o. male is on warfarin. Facility nurse reports for Raymond:    Other anticoagulants: No    Medication changes: No     Missed warfarin doses since last INR: No     Abnormal bleeding since last INR: No    New symptoms, injury or illness: No     Upcoming surgery, procedure or cardioversion: No    Recent INR Results:    Lab Results   Component Value Date    INR 2.38 (H) 06/17/2020    INR 2.20 (H) 06/04/2020    INR 3.38 (H) 05/20/2020       Anticoagulation Episode Summary     Current INR goal:   2.0-3.0   TTR:   84.4 % (1 y)   Next INR check:   7/15/2020   INR from last check:   2.38 (6/17/2020)   Weekly max warfarin dose:      Target end date:      INR check location:      Preferred lab:      Send INR reminders to:   [facility] (TC[facility]LTC[facility]prison)    Indications    A-fib (H) (Resolved) [I48.91]           Comments:            Anticoagulation Care Providers     Provider Role Specialty Phone number    Delmi Regan NP Responsible Nurse Practitioner [phone]         Recommended artificial tears to use: 1 drop 4x a day in both eyes.

## 2021-06-08 NOTE — PROGRESS NOTES
[facility]    DATE: 2020    NAME:  Raymond Zheng             :  1925  MRN: [id number]  CODE STATUS:  POLST on file    VISIT TYPE: Problem Visit (edema, sob)     FACILITY:  [facility] [[id number]]       CHIEF COMPLAIN/REASON FOR VISIT:    Chief Complaint   Patient presents with     Problem Visit     edema, sob               HISTORY OF PRESENT ILLNESS: Raymond Zheng is a 89 y.o. male who is a resident of [facility]. He has PMH of A fib, CVA, prostate cancer, HTN, knee osteoarthritis, low back pain, lumbar stenosis, pacemaker, vit b12 deficiency, memory issues, mild pulmonary HTN, dependent edema, dyspnea.     Today Mr. Zheng is seen today for review of systems but turned into problem visit due to concerns for edema and shortness of breath. Yesterday he was walking with staff and therapy and was noted to have to stop and catch his breath every few minutes. He has not been complaining of shortness of breath or cough but is noted to be winded with minimal exertion. On exam he is seen in his apartment after just coming back from the bathroom and he had to stop half way to his chair to catch his breath. He is more hunched over his walker today than usual as well. He appears fatigued and unkempt today, unusual appearance for him. He is pleasantly confused and says  He is fine and denies any issues. However on exam I noted 3+ ble pitting edema and he does say  His legs had been swollen. He is not sure when this started. He denies dizziness, fevers, cough, sore throat, shortness of breath, bowel concerns, or other issues. He says he sleeps fine on one pillow. He denies any pain today and says his legs are not aching. He is obviously short of breath during conversation even.  He is agreeable to trying to elevate his legs, wear compression stockings. We discussed adding services on so staff can monitor his weights and he is fine with that. He  says  He is willing to take water pills as well.     REVIEW OF SYSTEMS:  PROBLEMS AND REVIEW OF SYSTEMS:   Review of Systems  Today on ROS:   Currently, no fever, chills, or rigors. Decreased vision and hearing. Denies any chest pain, headaches, palpitations, lightheadedness, dizziness. Appetite is good. Denies any GERD symptoms. Denies any difficulty with swallowing, nausea, or vomiting.  Denies any abdominal pain, diarrhea or constipation. Denies any urinary symptoms. No insomnia.  Positive for forgetfulness, ambulates with walker, no shortness of breath, no recent back pain, nursing manages and administers meds, leg swelling, shortness of breath, confusion      Allergies   Allergen Reactions     Ciprofloxacin      Erythromycin Base      Oxycodone-Acetaminophen      Sulfa (Sulfonamide Antibiotics)      Current Outpatient Medications   Medication Sig     furosemide (LASIX) 20 MG tablet Take 20 mg by mouth daily.     potassium chloride (KLOR-CON) 10 MEQ CR tablet Take 10 mEq by mouth daily.     acetaminophen (TYLENOL) 500 MG tablet Take 1,000 mg by mouth 3 (three) times a day as needed for pain.      memantine (NAMENDA XR) 7 mg CSpX Take 7 mg by mouth at bedtime.     metoprolol succinate (TOPROL XL) 50 MG 24 hr tablet Take 1.5 tablets (75 mg total) by mouth daily.     nitroglycerin (NITROSTAT) 0.4 MG SL tablet Place 0.4 mg under the tongue as needed for chest pain.     polyethylene glycol (MIRALAX) 17 gram packet Take 17 g by mouth daily as needed.     SENEXON-S 8.6-50 mg tablet 2 TABS BY MOUTH EVERY BEDTIME     senna (SENOKOT) 8.6 mg tablet Take 2 tablets by mouth at bedtime.      trolamine salicylate (ASPERCREME) 10 % cream Apply 1 application topically every 6 (six) hours as needed.            warfarin (COUMADIN) 2.5 MG tablet Take 0.5-1 tablets (1.25-2.5 mg total) by mouth daily. 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 Followed by [facility].       Vitamin B12 deficiency      Vitiligo            PHYSICAL EXAMINATION  Vitals:    05/20/20 0700   BP: 140/80   Pulse: 78   Resp: 16   Temp: 96.7  F (35.9  C)   SpO2: 96%   Weight: 171 lb (77.6 kg)       Today on physical exam:     GENERAL: Awake, Alert, not in any form of acute distress, answers questions appropriately, follows simple commands, conversant, unkempt today  HEENT: Head is normocephalic with normal hair distribution. No evidence of trauma. Ears: No acute purulent discharge. Eyes: Conjunctivae pink with no scleral jaundice. Nose: Normal mucosa and septum. NECK: Supple with no cervical or supraclavicular lymphadenopathy. Trachea is midline. Glasses, hearing aids, [place]  CHEST: No tenderness or deformity, no crepitus  LUNG: dim with crackles to bilateral bases to auscultation with good chest expansion. There are no crackles or wheezes, normal AP diameter. No cough noted, shortness of breath with minimal exertion and talking  BACK: chronic lumbar pain but denies today, no radiation, no numb/tingling, ,moderate kyphosis  CVS: irregularly irregular rhythm, systolic murmur,  2+ pulses symmetric in all extremities.  ABDOMEN: Rounded and soft, nontender to palpation, non distended, no masses, no organomegaly, good bowel sounds, no rebound or guarding, no peritoneal signs.   EXTREMITIES: darin discoloration ble, 3+ ble pitting edema  SKIN: Warm and dry,   NEUROLOGICAL: The patient is oriented to person, place and time. Confused at times Forgetful at times, no numb/tingling ble. Very pleasant, mild flattened affect than baseline, mild depressive  symptoms            LABS:   Recent Results (from the past 168 hour(s))   Remote Device Check   Result Value Ref Range    Device Manufacture [employer]     Device Model [id number] ADVANTIO     Device Serial Number [id number]     Device Type Pacemaker     Device Implanting Provider PRASHANTH Mayorga     Comments/Summary       Type: routine pacemaker check   Presenting Rhythm: VS, rates: 70bpm  Lead/Battery status: stable  Arrhythmias: since 2/17/20; 7 ventricular high rate episodes, two available EGMs show probable RVR(RV lead only), v-rates >/=120bpm ~8%.  Anticoagulant: warfarin  Comments: normal device function. jtr     INR   Result Value Ref Range    INR 3.38 (H) 0.90 - 1.10     Results for orders placed or performed during the hospital encounter of 05/07/18   Basic Metabolic Panel   Result Value Ref Range    Sodium 134 (L) 136 - 145 mmol/L    Potassium 4.5 3.5 - 5.0 mmol/L    Chloride 99 98 - 107 mmol/L    CO2 23 22 - 31 mmol/L    Anion Gap, Calculation 12 5 - 18 mmol/L    Glucose 104 70 - 125 mg/dL    Calcium 10.0 8.5 - 10.5 mg/dL    BUN 18 8 - 28 mg/dL    Creatinine 1.12 0.70 - 1.30 mg/dL    GFR MDRD Af Amer >60 >60 mL/min/1.73m2    GFR MDRD Non Af Amer >60 >60 mL/min/1.73m2         Lab Results   Component Value Date    WBC 8.6 05/29/2019    HGB 11.2 (L) 05/29/2019    HCT 35.3 (L) 05/29/2019     05/29/2019     05/29/2019       Lab Results   Component Value Date    VASBFRQY75 796 10/02/2018     No results found for: HGBA1C  Lab Results   Component Value Date    INR 3.38 (H) 05/20/2020    INR 2.33 (H) 04/22/2020    INR 2.61 (H) 03/25/2020     No results found for: [id number]  Lab Results   Component Value Date    TSH 2.08 08/04/2015           ASSESSMENT/PLAN:    Acute CHF, fluid overload: 3+ ble pitting edema, h/o pulmonary htn, intermittent dyspnea. shortness of breath with minimal activity and with speaking today. Weight today 171lbs and last weight recorded 2 months ago was 159lbs.  Significant weight gain for him. Concerns for being poor historian and did not alert staff to symptoms. Even denies symptoms today. Requested staff weigh him at least weekly  And notify me if weight change >5lbs. Start lasix 40mg x 5 days then 20mg daily, kcl 10meq daily, bmp on 5/22. Elevate legs, gaurav hose or tubigrips. Monitor closely. o2 sat stable today.   Atrial fibrillation: Rate controlled 70s. No chest pain or palpitations. On metoprolol, warfarin. Anticoagulation clinic manages warfarin. No recent concerns.   HTN: SBP 140s. On metoprolol. Stable since recent increase in metoprolol.  S/p pacemaker: Follows with cardiology, [employer]. No recent concerns.   Cognitive impairment, vascular dementia:  on namenda 7mg xr. Very slow progressive decline, concern for further decline now that his wife has passed. Monitor closely and provide safe environment. No recent safety concerns or behavioral issues. Very poor historian and unable to identify or alert staff to symptoms of fluid overload, need to be monitored closely.   H/o prostate cancer: No recent issues. On flomax. Radiation seeding in 2004. Last follow up in December. No concerns today. Dc flomax risk v benefit. Med reductions.  Lumbar stenosis, chronic back pain: No radiation, no numb/tingling. Tylenol prn, aspercreme prn. Sees chiropractor regularly in facility. No recent pain or concerns.   Macular degeneration, intraocular hemorrhage: Follows with Dr. Bill Figueroa. On atropine and prednisolone gtts. No concerns today.   H/o CVA: On warfarin, no recent concerns.   Constipation: On colace prn and taking prune juice. senna daily prn. Stable recently.   Weights: 159lbs-171lbs.        Electronically signed by: Delmi Regan NP

## 2021-06-09 ENCOUNTER — RECORDS - HEALTHEAST (OUTPATIENT)
Dept: ADMINISTRATIVE | Facility: CLINIC | Age: 86
End: 2021-06-09

## 2021-06-09 NOTE — TELEPHONE ENCOUNTER
ANTICOAGULATION  MANAGEMENT    Assessment     7/15's INR result of 2.04 is Therapeutic (goal INR of 2.0-3.0)        Previous INR was Therapeutic    Warfarin given as previously instructed    No new health/diet changes affecting INR    No new medication/supplements affecting INR    Continues to tolerate warfarin with no reported s/s of bleeding or thromboembolism       Plan:     Warfarin Dosing Orders:  Continue current warfarin dose    2.5 mg every Sat; 1.25 mg all other days         Next INR: 4 weeks.    Telephone orders given to nurseAlicja.  Orders read back correctly.     Boo Quesada RN    Subjective/Objective:      Raymond Zheng, a 89 y.o. male is on warfarin. Facility nurse reports for Raymond:    Other anticoagulants: No    Medication changes: No     Missed warfarin doses since last INR: No     Abnormal bleeding since last INR: No    New symptoms, injury or illness: No     Upcoming surgery, procedure or cardioversion: No    Recent INR Results:    Lab Results   Component Value Date    INR 2.04 (H) 07/15/2020    INR 2.38 (H) 06/17/2020    INR 2.20 (H) 06/04/2020       Anticoagulation Episode Summary     Current INR goal:   2.0-3.0   TTR:   84.4 % (1 y)   Next INR check:   8/12/2020   INR from last check:   2.04 (7/15/2020)   Weekly max warfarin dose:      Target end date:      INR check location:      Preferred lab:      Send INR reminders to:   [facility] (TCU/LTC/FCI)    Indications    A-fib (H) (Resolved) [I48.91]           Comments:            Anticoagulation Care Providers     Provider Role Specialty Phone number    Delmi Regan NP Responsible Nurse Practitioner [phone]

## 2021-06-09 NOTE — PROGRESS NOTES
[facility]    DATE: 2020    NAME:  Raymond Zheng             :  1925  MRN: [id number]  CODE STATUS:  POLST on file    VISIT TYPE: Problem Visit (fluid overload)     FACILITY:  [facility] [[id number]]       CHIEF COMPLAIN/REASON FOR VISIT:    Chief Complaint   Patient presents with     Problem Visit     fluid overload               HISTORY OF PRESENT ILLNESS: Raymond Zheng is a 89 y.o. male who is a resident of [facility]. He has PMH of A fib, CVA, prostate cancer, HTN, knee osteoarthritis, low back pain, lumbar stenosis, pacemaker, vit b12 deficiency, memory issues, mild pulmonary HTN, dependent edema, dyspnea.     Today Mr. Zheng is seen for concerns of persistent leg edema and fluid overload. On exam he is seen in his apartment alone. He says he is not having issues with shortness of breath but his legs continue to remain very swollen. He says he is not sure if they have improved over the last few weeks. He thinks he tries to elevate them but not sure. He is not wearing compression stockings and not sure if he has any. He doesn't think the staff have been putting these on. He denies any dizziness, headaches, or appetite changes. His bowels are working fine and he thinks he is sleeping well. He is sleeping in the bed and denies using more pillows than before or having difficulty  Laying flat. He is not having any chest pain, pressure, or palpitations. We discussed his last heart ultrasound was a few years ago and the concerns for symptoms of progressive heart failure. He agrees to doing an echocardiogram, we discussed will see if PPX can do this in the facility. We discussed increasing diuretics further and importance of compression therapy and elevating legs. Per nursing he had compression stockings and they were putting these on him but then he threw them away. He also had tubigrips then and they can no longer find these either in  his apartment. Therapy was asking about doing lymphedema wrapping on him as he would not be able to remove these himself. Staff do not believe he is elevating his legs. He has become more confused recently for staff as well. He did have bmp drawn today. His weights are documented as being done weekly but on review of numbers the only two weights recorded this month was a 142 and 169lbs so appears to be inconsistent. Staff do report he remains very winded and out of breath with any activity.     REVIEW OF SYSTEMS:  PROBLEMS AND REVIEW OF SYSTEMS:   Review of Systems  Today on ROS:   Currently, no fever, chills, or rigors. Decreased vision and hearing. Denies any chest pain, headaches, palpitations, lightheadedness, dizziness. Appetite is good. Denies any GERD symptoms. Denies any difficulty with swallowing, nausea, or vomiting.  Denies any abdominal pain, diarrhea or constipation. Denies any urinary symptoms. No insomnia.  Positive for forgetfulness, ambulates with walker,  no recent back pain, nursing manages and administers meds, leg swelling, confusion, denies shortness of breath but appears shortness of breath when ambulating      Allergies   Allergen Reactions     Ciprofloxacin      Erythromycin Base      Oxycodone-Acetaminophen      Sulfa (Sulfonamide Antibiotics)      Current Outpatient Medications   Medication Sig     acetaminophen (TYLENOL) 500 MG tablet Take 1,000 mg by mouth 3 (three) times a day as needed for pain.      furosemide (LASIX) 20 MG tablet Take 20 mg by mouth daily.      memantine (NAMENDA XR) 7 mg CSpX 1 CAP BY MOUTH EVERY BEDTIME (DX: MEMORY LOSS)     metoprolol succinate (TOPROL XL) 50 MG 24 hr tablet Take 1.5 tablets (75 mg total) by mouth daily.     nitroglycerin (NITROSTAT) 0.4 MG SL tablet Place 0.4 mg under the tongue as needed for chest pain.     polyethylene glycol (MIRALAX) 17 gram packet Take 17 g by mouth daily as needed.     potassium chloride (KLOR-CON) 10 MEQ CR tablet Take 10  mEq by mouth daily.      SENEXON-S 8.6-50 mg tablet 2 TABS BY MOUTH EVERY BEDTIME     senna (SENOKOT) 8.6 mg tablet Take 2 tablets by mouth at bedtime.      trolamine salicylate (ASPERCREME) 10 % cream Apply 1 application topically every 6 (six) hours as needed.            warfarin (COUMADIN) 2.5 MG tablet Take 0.5-1 tablets (1.25-2.5 mg total) by mouth daily. 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 Followed by [facility].       Vitamin B12 deficiency      Vitiligo            PHYSICAL EXAMINATION  There were no vitals filed for this visit.    Today on physical exam:     GENERAL: Awake, Alert, not in any form of acute distress, answers questions appropriately, follows simple commands, conversant, pleasant  HEENT: Head is normocephalic with normal hair distribution. No evidence of trauma. Ears: No acute purulent discharge. Eyes: Conjunctivae pink with no scleral jaundice. Nose: Normal mucosa and septum. NECK: Supple with no cervical or supraclavicular lymphadenopathy. Trachea is midline. Glasses, hearing aids, [place]  CHEST: No tenderness or deformity, no crepitus  LUNG: dim with few crackles to bilateral bases to auscultation with good chest expansion. There are no crackles or wheezes, normal AP diameter. No cough noted, shortness of breath noted at rest, per staff is shortness of breath with any activity  BACK: chronic lumbar pain but denies today, no radiation, no numb/tingling, ,moderate kyphosis  CVS: irregularly irregular rhythm, systolic murmur,  2+ pulses symmetric in all  extremities.  ABDOMEN: Rounded and soft, nontender to palpation, non distended, no masses, no organomegaly, good bowel sounds, no rebound or guarding, no peritoneal signs.   EXTREMITIES: darin discoloration ble, 2+ ble pitting edema  SKIN: Warm and dry,   NEUROLOGICAL: The patient is oriented to person, place and time. Confused at times Forgetful at times, no numb/tingling ble.  More flat affect than baseline            LABS:   Recent Results (from the past 168 hour(s))   Basic Metabolic Panel   Result Value Ref Range    Sodium 142 136 - 145 mmol/L    Potassium 3.9 3.5 - 5.0 mmol/L    Chloride 107 98 - 107 mmol/L    CO2 25 22 - 31 mmol/L    Anion Gap, Calculation 10 5 - 18 mmol/L    Glucose 68 (L) 70 - 125 mg/dL    Calcium 9.4 8.5 - 10.5 mg/dL    BUN 32 (H) 8 - 28 mg/dL    Creatinine 1.34 (H) 0.70 - 1.30 mg/dL    GFR MDRD Af Amer 60 (L) >60 mL/min/1.73m2    GFR MDRD Non Af Amer 50 (L) >60 mL/min/1.73m2     Results for orders placed or performed in visit on 06/24/20   Basic Metabolic Panel   Result Value Ref Range    Sodium 142 136 - 145 mmol/L    Potassium 3.9 3.5 - 5.0 mmol/L    Chloride 107 98 - 107 mmol/L    CO2 25 22 - 31 mmol/L    Anion Gap, Calculation 10 5 - 18 mmol/L    Glucose 68 (L) 70 - 125 mg/dL    Calcium 9.4 8.5 - 10.5 mg/dL    BUN 32 (H) 8 - 28 mg/dL    Creatinine 1.34 (H) 0.70 - 1.30 mg/dL    GFR MDRD Af Amer 60 (L) >60 mL/min/1.73m2    GFR MDRD Non Af Amer 50 (L) >60 mL/min/1.73m2         Lab Results   Component Value Date    WBC 8.6 05/29/2019    HGB 11.2 (L) 05/29/2019    HCT 35.3 (L) 05/29/2019     05/29/2019     05/29/2019       Lab Results   Component Value Date    [id number] 796 10/02/2018     No results found for: HGBA1C  Lab Results   Component Value Date    INR 2.38 (H) 06/17/2020    INR 2.20 (H) 06/04/2020    INR 3.38 (H) 05/20/2020     No results found for: [id number]  Lab Results   Component Value Date    TSH 2.08 08/04/2015           ASSESSMENT/PLAN:    Fluid overload,  dependent edema, Dyspnea, Acute on chronic CHF: 2+ ble pitting edema, h/o pulmonary htn, intermittent dyspnea. shortness of breath with exertion, weekly [id number], also staff had reported a 171lbs at one point. Unclear that these are accurate weights. Continue weekly weights, try  To use same scale, weigh patient same time of day with same amount of clothing. Edema continues to remain worse than baseline, noncompliant with elevation and compression stockings. Will order lymphedema wrapping per therapy. Bmp 6/24 stable, on lasix daily. Will add metolazone 5mg x 3 days prior to lasix. Monitor for weight loss. Ordered Echocardiogram as last done in October 2017 with no notes of heart failure noted. Appears to have developed acute on chronic CHF. Repeat bmp  On 7/1.   Atrial fibrillation:. No chest pain or palpitations. On metoprolol, warfarin. Anticoagulation clinic manages warfarin. stable  HTN: SBP. On metoprolol. No recent concerns.   S/p pacemaker: Follows with cardiology, [facility]. No recent concerns.   Cognitive impairment, vascular dementia:  on namenda 7mg xr. Slow progressive decline, per staff more confusion noted since wife passed away.   H/o prostate cancer: No recent issues. On flomax. Radiation seeding in 2004. Last follow up in December. No concerns today. Dc flomax risk v benefit. Med reductions.  Lumbar stenosis, chronic back pain: No radiation, no numb/tingling. Tylenol prn, aspercreme prn. Sees chiropractor regularly in facility. No recent pain or concerns.   Macular degeneration, intraocular hemorrhage: Follows with Dr. Bill Figueroa. On atropine and prednisolone gtts. No concerns today.   H/o CVA: On warfarin, no recent concerns.   Constipation: On colace prn and taking prune juice. senna daily prn. Stable recently.   Weights: [id number] but also has a 142lbs weight documented this month.  Weigh weekly, notify for weight change of 5 lbs.   CKD stage 2: cr 1.24, GFR 50 on 6/24.  Previously 1.32, gfr 51.     Electronically signed by: Delmi Regan NP

## 2021-06-09 NOTE — TELEPHONE ENCOUNTER
INR result is: 2.04 7/15  INR   Date Value Ref Range Status   07/15/2020 2.04 (H) 0.90 - 1.10 Final       Will the patient be seen, or did they already see, MD or CNP today? No    Most Recent Warfarin dose day/week  Sunday Monday Tuesday Wednesday Thursday Friday Saturday       1.25 mg 1.25 mg 2.5 mg     Sunday Monday Tuesday Wednesday Thursday Friday Saturday   1.25 mg 1.25 mg 1.25 mg 1.25 mg          Has the patient missed any doses of Coumadin, Warfarin, Jantoven in the past 7 days? No    Has the patients medications changed since the last visit? No    Has the patient experienced any bleeding recently? No    Has the patient experienced any injuries or illness recently? No    Has the patient experienced any 'new' shortness of breath, severe headaches, or changes in vision recently? No    Has the patient had any changes in their diet, or alcohol consumption? No    Is the patient here today to prepare for any type of upcoming surgery, procedure, or for a cardioversion procedure? No    What phone number can we reach the patient at today? [phone] [place]

## 2021-06-09 NOTE — PROGRESS NOTES
[facility]    DATE: 2020    NAME:  Raymond Zheng             :  1925  MRN: [id number]  CODE STATUS:  POLST on file    VISIT TYPE: Problem Visit (fluid overload, edema)     FACILITY:  [facility] [[id number]]       CHIEF COMPLAIN/REASON FOR VISIT:    Chief Complaint   Patient presents with     Problem Visit     fluid overload, edema               HISTORY OF PRESENT ILLNESS: Raymond Zheng is a 89 y.o. male who is a resident of [facility]. He has PMH of A fib, CVA, prostate cancer, HTN, knee osteoarthritis, low back pain, lumbar stenosis, pacemaker, vit b12 deficiency, memory issues, mild pulmonary HTN, dependent edema, dyspnea.     Today Mr. Zheng is seen for concerns of persistent leg edema and fluid overload. He is seen sitting in a chair today. His legs are dependent position. He was noted to be ambulating in the hallways around the [facility] facility with  His walker today. Staff also report he missed his echo that was scheduled for yesterday  Because he went out of the building with friends. Staff were not aware he was leaving until after he left. Echo has been rescheduled for next week. He did have labs drawn this morning. He has not been noted to be as short of breath when walking but his legs continue to remain very swollen. He continues to be noncompliant with  Compression stockings and nursing was not sure why therapy has not started lymphedema treatments yet. He did complete recent metolazone burst. He says today that he feels fine. He doesn't think he is short of breath or having any  Issues with breathing. He does say his legs are still swollen but that they might be better than they  Were. He is not having any bowel concerns and urinating fine. He denies any Issues sleeping. He does like to walk and has been trying to do this more. He has been going out at times and visiting friends. He denies any sore throat or  cough.     REVIEW OF SYSTEMS:  PROBLEMS AND REVIEW OF SYSTEMS:   Review of Systems  Today on ROS:   Currently, no fever, chills, or rigors. Decreased vision and hearing. Denies any chest pain, headaches, palpitations, lightheadedness, dizziness. Appetite is good. Denies any GERD symptoms. Denies any difficulty with swallowing, nausea, or vomiting.  Denies any abdominal pain, diarrhea or constipation. Denies any urinary symptoms. No insomnia.  Positive for forgetfulness, ambulates with walker,  no recent back pain, nursing manages and administers meds, leg swelling, confusion, denies shortness of breath but appears shortness of breath with exertion (improved from previous visit though)      Allergies   Allergen Reactions     Ciprofloxacin      Erythromycin Base      Oxycodone-Acetaminophen      Sulfa (Sulfonamide Antibiotics)      Current Outpatient Medications   Medication Sig     acetaminophen (TYLENOL) 500 MG tablet Take 1,000 mg by mouth 3 (three) times a day as needed for pain.      furosemide (LASIX) 20 MG tablet Take 40 mg by mouth daily.      memantine (NAMENDA XR) 7 mg CSpX 1 CAP BY MOUTH EVERY BEDTIME (DX: MEMORY LOSS)     metoprolol succinate (TOPROL XL) 50 MG 24 hr tablet Take 1.5 tablets (75 mg total) by mouth daily.     nitroglycerin (NITROSTAT) 0.4 MG SL tablet Place 0.4 mg under the tongue as needed for chest pain.     polyethylene glycol (MIRALAX) 17 gram packet Take 17 g by mouth daily as needed.     potassium chloride (KLOR-CON) 10 MEQ CR tablet Take 20 mEq by mouth daily.      SENEXON-S 8.6-50 mg tablet 2 TABS BY MOUTH EVERY BEDTIME     senna (SENOKOT) 8.6 mg tablet Take 2 tablets by mouth at bedtime.      trolamine salicylate (ASPERCREME) 10 % cream Apply 1 application topically every 6 (six) hours as needed.            warfarin (COUMADIN) 2.5 MG tablet Take 0.5-1 tablets (1.25-2.5 mg total) by mouth daily. 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 Followed by [facility].       Vitamin B12 deficiency      Vitiligo            PHYSICAL EXAMINATION  Vitals:    07/01/20 0700   BP: 129/77   Pulse: 94   Resp: 20   Temp: 98  F (36.7  C)   SpO2: 94%   Weight: 148 lb (67.1 kg)       Today on physical exam:     GENERAL: Awake, Alert, not in any form of acute distress, answers questions appropriately, follows simple commands, conversant, pleasant  HEENT: Head is normocephalic with normal hair distribution. No evidence of trauma. Ears: No acute purulent discharge. Eyes: Conjunctivae pink with no scleral jaundice. Nose: Normal mucosa and septum. NECK: Supple with no cervical or supraclavicular lymphadenopathy. Trachea is midline. Glasses, hearing aids, [place]  CHEST: No tenderness or deformity, no crepitus  LUNG: dim but clear to bilateral bases to auscultation with good chest expansion. There are no crackles or wheezes, normal AP diameter. No cough noted, no shortness of breath at rest, some with  Ambulation but improved from previous visit  BACK: chronic lumbar pain but denies today, no radiation, no numb/tingling, ,moderate kyphosis  CVS: irregularly irregular rhythm, systolic murmur,  2+ pulses symmetric in all extremities.  ABDOMEN: Rounded and soft, nontender to palpation, non distended, no masses, no organomegaly, good bowel sounds, no rebound or guarding, no peritoneal signs.   EXTREMITIES: darin discoloration ble, 2+ ble pitting edema  SKIN: Warm and dry,   NEUROLOGICAL: The patient is oriented to person, place and time. Confused at times Forgetful at times, no numb/tingling ble.  More  pleasant and  today            LABS:   Recent Results (from the past 168 hour(s))   Basic Metabolic Panel   Result Value Ref Range    Sodium 138 136 - 145 mmol/L    Potassium 4.0 3.5 - 5.0 mmol/L    Chloride 103 98 - 107 mmol/L    CO2 26 22 - 31 mmol/L    Anion Gap, Calculation 9 5 - 18 mmol/L    Glucose 69 (L) 70 - 125 mg/dL    Calcium 9.6 8.5 - 10.5 mg/dL    BUN 35 (H) 8 - 28 mg/dL    Creatinine 1.43 (H) 0.70 - 1.30 mg/dL    GFR MDRD Af Amer 56 (L) >60 mL/min/1.73m2    GFR MDRD Non Af Amer 46 (L) >60 mL/min/1.73m2     Results for orders placed or performed in visit on 07/01/20   Basic Metabolic Panel   Result Value Ref Range    Sodium 138 136 - 145 mmol/L    Potassium 4.0 3.5 - 5.0 mmol/L    Chloride 103 98 - 107 mmol/L    CO2 26 22 - 31 mmol/L    Anion Gap, Calculation 9 5 - 18 mmol/L    Glucose 69 (L) 70 - 125 mg/dL    Calcium 9.6 8.5 - 10.5 mg/dL    BUN 35 (H) 8 - 28 mg/dL    Creatinine 1.43 (H) 0.70 - 1.30 mg/dL    GFR MDRD Af Amer 56 (L) >60 mL/min/1.73m2    GFR MDRD Non Af Amer 46 (L) >60 mL/min/1.73m2         Lab Results   Component Value Date    WBC 8.6 05/29/2019    HGB 11.2 (L) 05/29/2019    HCT 35.3 (L) 05/29/2019     05/29/2019     05/29/2019       Lab Results   Component Value Date    [id number] 796 10/02/2018     No results found for: HGBA1C  Lab Results   Component Value Date    INR 2.38 (H) 06/17/2020    INR 2.20 (H) 06/04/2020    INR 3.38 (H) 05/20/2020     No results found for: [id number]  Lab Results   Component Value Date    TSH 2.08 08/04/2015           ASSESSMENT/PLAN:    Fluid overload, dependent edema, Dyspnea, Acute on chronic CHF: 2+ ble pitting edema, h/o pulmonary htn, intermittent dyspnea. shortness of breath with exertion althougt improved from previous visit, weekly [id number], feel 169 and possibly 171  was inaccurate.  Unclear that these are accurate weights. Continue weekly weights,noncompliant with compression stockings. Lymphedema treatments  ordered last week per therapy but have not started-staff to check on this today. Bmp today cr 1.43, previously  Completed metolazone burst. appears mildly  Improved today. Continue to educate on importance of low sodium intake, elevation. Unfortunately  Missed echo appointment for yesterday, rescheduled for next week. Will increase daily lasix to 40mg. Ordered Echocardiogram as last done in October 2017 with no notes of heart failure noted. Appears to have developed acute on chronic CHF.   Atrial fibrillation:. No chest pain or palpitations. On metoprolol, warfarin. Anticoagulation clinic manages warfarin. Stable recently.   HTN: SBP. On metoprolol. No recent concerns.   S/p pacemaker: Follows with cardiology, [facility] No recent concerns.   Cognitive impairment, vascular dementia:  on namenda 7mg xr. Slow progressive decline, per staff more confusion noted since wife passed away.   H/o prostate cancer: No recent issues. On flomax. Radiation seeding in 2004. Last follow up in December. No concerns today. Dc flomax risk v benefit. Med reductions.  Lumbar stenosis, chronic back pain: No radiation, no numb/tingling. Tylenol prn, aspercreme prn. Sees chiropractor regularly in facility. No recent pain or concerns.   Macular degeneration, intraocular hemorrhage: Follows with Dr. Bill Figueroa. On atropine and prednisolone gtts. No concerns today.   H/o CVA: On warfarin, no recent concerns.   Constipation: On colace prn and taking prune juice. senna daily prn. Stable recently.   Weights: [id number] but also has a 142lbs weight documented this month.  Weigh weekly, notify for weight change of 5 lbs.   CKD stage 2: cr 1.24, GFR 50 on 6/24. Previously 1.32, gfr 51.     Electronically signed by: Delmi Regan NP

## 2021-06-10 NOTE — PROGRESS NOTES
ASSESSMENT: Toe ulcers under calluses, hammertoe    PLAN: Calluses were debrided down to reveal ulcerations at the end of the right fourth toe and on the medial side of the left second toe.  Partial-thickness skin debridement done today.  No acute infection today.  We talked about padding and protecting these areas to facilitate healing.  Follow-up here in 1 month.      SUBJECTIVE: New patient visit at the [facility] regarding calluses on his toes.  He has advanced hammertoe deformities which are leading to friction between the toes and distal irritation with weightbearing.  There is a person who cuts his toenails once a month, and she had recommended that he see a podiatrist.  He has tried padding between the toes just recently.  He has not seen bleeding or drainage.  He denies fever or chills.  He is not diabetic.    PHYSICAL EXAM:  General: Pleasant 89 y.o. male in no acute distress.  Vascular: DP pulses are diminished. PT pulses are absent. Pedal hair is absent. Feet are warm to the touch.  Cardiac: Pulse is regular.  Lymphatic: No edema at the ankles.  Neuro: Sensation in the feet is grossly intact to light touch.  Derm: Hyperkeratosis at the end of multiple toes.  Right down.  Small ulcerations noted on the left second toe on the medial side of the proximal interphalangeal joint and at the end of the right fourth toe.  No exposed tendon or bone here.  Musculoskeletal: Hammertoes bilaterally with pressure between the first and second toes.    Past Medical History:   Diagnosis Date     Atrial fibrillation      CVA (cerebral infarction)      DNR (do not resuscitate) 10/18/2016     Former smoker      HTN (hypertension)      Knee osteoarthritis      Pacemaker      Prostate cancer      Vitamin B12 deficiency      Vitiligo         has a past surgical history that includes Appendectomy; Hernia repair; Partial gastrectomy; and Laminectomy.    Allergies   Allergen Reactions     Ciprofloxacin      Erythromycin Base       Oxycodone-Acetaminophen      Sulfa (Sulfonamide Antibiotics)        Current Outpatient Prescriptions   Medication Sig Dispense Refill     cyanocobalamin (VITAMIN B-12) 1000 MCG tablet Take 1,000 mcg by mouth daily.       digoxin (LANOXIN) 125 mcg tablet TAKE ONE TABLET BY MOUTH DAILY 90 tablet 3     diltiazem (CARDIZEM CD) 180 MG 24 hr capsule TAKE ONE CAPSULE BY MOUTH ONE TIME DAILY 90 capsule 3     FOLIC ACID/MV,FE,MIN (CENTRUM ORAL) Take 1 tablet by mouth daily.        gabapentin (NEURONTIN) 100 MG capsule Take 100 mg by mouth at bedtime as needed.  3     metoprolol succinate (TOPROL-XL) 25 MG Take 1 tablet (25 mg total) by mouth daily. 90 tablet 3     nitroglycerin (NITROSTAT) 0.4 MG SL tablet Place 0.4 mg under the tongue as needed for chest pain.       tamsulosin (FLOMAX) 0.4 mg Cp24 Take 1 capsule (0.4 mg total) by mouth daily. 30 capsule 5     warfarin (COUMADIN) 2.5 MG tablet Take one-half to 1 tablet (1.25mg-2.5mg ) by mouth daily as directed. Adjust dose as directed per INR results. 30 tablet 3     No current facility-administered medications for this visit.        Family History:  family history includes Cancer in his mother; Heart disease in his brother and sister; Kidney disease in his father; Lung cancer in his brother; Stroke in his sister.    Social History:  Reviewed, and he reports that he has quit smoking. He does not have any smokeless tobacco history on file. He reports that he drinks about 1.5 oz of alcohol per week     Review of Systems:  A 12 point comprehensive review of systems was negative except as noted.

## 2021-06-10 NOTE — PROGRESS NOTES
[facility]    DATE: 2020    NAME:  Raymond Zheng             :  1925  MRN: [id number]  CODE STATUS:  POLST on file    VISIT TYPE: Problem Visit (eye drainage)     FACILITY:  [facility] [[id number]]       CHIEF COMPLAIN/REASON FOR VISIT:    Chief Complaint   Patient presents with     Problem Visit     eye drainage               HISTORY OF PRESENT ILLNESS: Raymond Zheng is a 89 y.o. male who is a resident of [facility]. He has PMH of A fib, CVA, prostate cancer, HTN, knee osteoarthritis, low back pain, lumbar stenosis, pacemaker, vit b12 deficiency, memory issues, mild pulmonary HTN, dependent edema, dyspnea.     Today Mr. Zheng is seen for concerns of eye drainage and redness. The  reported he had large amount of purulent drainage from both of his eyes this morning. He is seen in his apartment today alone. He reports he is fine and does not recall having issues with his eyes this morning. He denies any itching, pain, discomfort from his eyes. He is noted to have significant redness and inflammation on his eyelids. His sclera is clear but small amount of purulent drainage noted. Per RA there was a large amount of purulent drainage from eyes this morning.     REVIEW OF SYSTEMS:  PROBLEMS AND REVIEW OF SYSTEMS:   Review of Systems  Today on ROS:   Currently, no fever, chills, or rigors. Decreased vision and hearing. Denies any chest pain, headaches, palpitations, lightheadedness, dizziness. Appetite is good. Denies any GERD symptoms. Denies any difficulty with swallowing, nausea, or vomiting.  Denies any abdominal pain, diarrhea or constipation. Denies any urinary symptoms. No insomnia.  Positive for forgetfulness, ambulates with walker,  no recent back pain, nursing manages and administers meds, confusion, shortness of breath improved recently, edema improved recently, eye redness and drainage      Allergies   Allergen  Reactions     Ciprofloxacin      Erythromycin Base      Oxycodone-Acetaminophen      Sulfa (Sulfonamide Antibiotics)      Current Outpatient Medications   Medication Sig     acetaminophen (TYLENOL) 500 MG tablet Take 1,000 mg by mouth 3 (three) times a day as needed for pain.      furosemide (LASIX) 20 MG tablet Take 40 mg by mouth daily.      furosemide (LASIX) 40 MG tablet 1 TAB BY MOUTH DAILY (DX: EDEMA)     memantine (NAMENDA XR) 7 mg CSpX 1 CAP BY MOUTH EVERY BEDTIME (DX: MEMORY LOSS)     metoprolol succinate (TOPROL XL) 50 MG 24 hr tablet Take 1.5 tablets (75 mg total) by mouth daily.     nitroglycerin (NITROSTAT) 0.4 MG SL tablet Place 0.4 mg under the tongue as needed for chest pain.     polyethylene glycol (MIRALAX) 17 gram packet Take 17 g by mouth daily as needed.     potassium chloride (K-DUR,KLOR-CON) 20 MEQ tablet 1 TAB BY MOUTH DAILY (DX: HYPOKALEMIA)     potassium chloride (KLOR-CON) 10 MEQ CR tablet Take 20 mEq by mouth daily.      SENEXON-S 8.6-50 mg tablet 2 TABS BY MOUTH EVERY BEDTIME     senna (SENOKOT) 8.6 mg tablet Take 2 tablets by mouth at bedtime.      trolamine salicylate (ASPERCREME) 10 % cream Apply 1 application topically every 6 (six) hours as needed.            warfarin (COUMADIN) 2.5 MG tablet Take 0.5-1 tablets (1.25-2.5 mg total) by mouth daily. 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 Followed by [facility].       Vitamin B12 deficiency      Vitiligo            PHYSICAL EXAMINATION  There were no vitals filed for this visit.    Today on physical exam:      GENERAL: Awake, Alert, not in any form of acute distress, answers questions appropriately, follows simple commands, conversant, pleasant  HEENT: Head is normocephalic with normal hair distribution. No evidence of trauma. Ears: No acute purulent discharge. Eyes: Conjunctivae pink with no scleral jaundice. Nose: Normal mucosa and septum. NECK: Supple with no cervical or supraclavicular lymphadenopathy. Trachea is midline. Glasses, hearing aids, [place], purulent drainage from eyes noted by staff, erythema, edema on bilateral eye lids  CHEST: No tenderness or deformity, no crepitus  LUNG: dim but clear to bilateral bases to auscultation with good chest expansion. There are no crackles or wheezes, normal AP diameter. No cough noted, no shortness of breath at rest, some with  Ambulation but improved from previous visit  BACK: chronic lumbar pain but denies today, no radiation, no numb/tingling, moderate kyphosis  CVS: irregularly irregular rhythm, systolic murmur,  2+ pulses symmetric in all extremities.  ABDOMEN: Rounded and soft, nontender to palpation, non distended, no masses, no organomegaly, good bowel sounds, no rebound or guarding, no peritoneal signs.   EXTREMITIES: darin discoloration ble, 1-2+ ble pitting edema  SKIN: Warm and dry,   NEUROLOGICAL: The patient is oriented to person, place and time. Confused at times Forgetful at times, no numb/tingling ble.  Pleasantly confused            LABS:   No results found for this or any previous visit (from the past 168 hour(s)).  Results for orders placed or performed in visit on 07/01/20   Basic Metabolic Panel   Result Value Ref Range    Sodium 138 136 - 145 mmol/L    Potassium 4.0 3.5 - 5.0 mmol/L    Chloride 103 98 - 107 mmol/L    CO2 26 22 - 31 mmol/L    Anion Gap, Calculation 9 5 - 18 mmol/L    Glucose 69 (L) 70 - 125 mg/dL    Calcium 9.6 8.5 - 10.5 mg/dL    BUN 35 (H) 8 - 28 mg/dL    Creatinine 1.43 (H) 0.70 - 1.30 mg/dL    GFR MDRD Af Amer 56 (L) >60  mL/min/1.73m2    GFR MDRD Non Af Amer 46 (L) >60 mL/min/1.73m2         Lab Results   Component Value Date    WBC 8.6 05/29/2019    HGB 11.2 (L) 05/29/2019    HCT 35.3 (L) 05/29/2019     05/29/2019     05/29/2019       Lab Results   Component Value Date    [id number] 796 10/02/2018     No results found for: HGBA1C  Lab Results   Component Value Date    INR 2.04 (H) 07/15/2020    INR 2.38 (H) 06/17/2020    INR 2.20 (H) 06/04/2020     No results found for: [id number]  Lab Results   Component Value Date    TSH 2.08 08/04/2015           ASSESSMENT/PLAN:    Bilateral blepharitis v ocular inflammation: will order cipro gtts three times a day x 7 days. Warm compresses q4h prn. Notify if not improving in few days.   Fluid overload, dependent edema, Dyspnea, Acute on chronic CHF: 1-2+ ble pitting edema, h/o pulmonary htn, intermittent dyspnea. shortness of breath with exertion improved recently, weekly [id number],  Completed metolazone burst. Echo was ordered, unclear if resulted yet. On lasix daily. Stable at this time.   Atrial fibrillation:. No chest pain or palpitations. On metoprolol, warfarin. Anticoagulation clinic manages warfarin. Stable recently.   HTN: SBP. On metoprolol. No recent concerns.   S/p pacemaker: Follows with cardiology, [facility]. No recent concerns.   Cognitive impairment, vascular dementia:  on namenda 7mg xr. Slow progressive decline, per staff more confusion noted since wife passed away.   H/o prostate cancer: No recent issues. On flomax. Radiation seeding in 2004. Last follow up in December. No concerns today. Dc flomax risk v benefit. Med reductions.  Lumbar stenosis, chronic back pain: No radiation, no numb/tingling. Tylenol prn, aspercreme prn. Sees chiropractor regularly in facility. No recent pain or concerns.   Macular degeneration, intraocular hemorrhage: Follows with Dr. Bill Figueroa. On atropine and prednisolone gtts. No concerns today.   H/o CVA: On  warfarin, no recent concerns.   Constipation: On colace prn and taking prune juice. senna daily prn. Stable recently.   Weights: [id number] but also has a 142lbs weight documented this month.  Weigh weekly, notify for weight change of 5 lbs.   CKD stage 2: cr 1.24, GFR 50 on 6/24. Previously 1.32, gfr 51.     Electronically signed by: Delmi Regan NP

## 2021-06-10 NOTE — PROGRESS NOTES
[facility] Progress Note    Assessment:  1.  Chronic atrial fibrillation without associated symptoms.  He did have higher heart rates in fluid retention a few years ago but with the current combination of medications he is done well.  He remains on low-dose digoxin, diltiazem and metoprolol as higher doses resulted in a tendency towards lower blood pressure.  Digoxin level October 2016 was 1.0.  Additional laboratory studies are reviewed to the INRs that have been stable.  He is reminded to avoid nonsteroidal anti-laboratory medicines with his warfarin.  We will plan to follow-up in 6 months or sooner if there is any specific questions that were to arise.  Babs profile from December 2016 reveals stable BUN and creatinine.        Plan: Follow-up 6 months    1. Pacemaker     2. Chronic atrial fibrillation  digoxin (LANOXIN) 125 mcg tablet         An After Visit Summary was printed and given to the patient.    Subjective:    Raymond Zheng is a 89 y.o. male who returned for a planned  follow up visit.  He is here for his pacemaker check today that demonstrates chronic atrial fibrillation with stable pacemaker characteristics.  He has had brief episodes of higher heart rate they are asymptomatic.  He denies chest discomfort, shortness of breath, lower extremity edema, dizziness or lightheadedness.  He continues to dance 1-2 times per week and states that he steady on his feet.  I reviewed issues related to warfarin and compared them to the newer anticoagulant agents and his preference is to remain on warfarin.  He had an echocardiogram in 2012 that demonstrated normal left ventricular systolic function without significant valve abnormality.  Number of years ago he was noting some lower extremity edema associated with higher ventricular rates but with the current combination of medications this is been well controlled.  We discussed whether we would repeat an echocardiogram given that has been  approximately 5 years with underlying atrial fibrillation but his preference was not to have an echocardiogram especially since he is feeling well.    Review of Systems:   General: WNL  Eyes: WNL  Ears/Nose/Throat: WNL  Lungs: Cough  Heart: WNL  Stomach: WNL  Bladder: Frequent Urination at Night  Muscle/Joints: WNL  Skin: WNL  Nervous System: WNL  Mental Health: WNL     Blood: WNL      Problem List:    Patient Active Problem List   Diagnosis     Prostate cancer - 2002 - [place] [facility] - Treated with radiation brachytherapy. Followed by [facility].     Pacemaker     Atrial fibrillation     Vitamin B12 deficiency     Knee osteoarthritis     CVA (cerebral infarction)     HTN (hypertension)     Former smoker     DNR (do not resuscitate)       Social History     Social History     Marital status:      Spouse name: Sydni     Number of children: 0     Years of education: N/A     Occupational History      Retired     Social History Main Topics     Smoking status: Former Smoker     Smokeless tobacco: Not on file     Alcohol use 1.5 oz/week     3 drink(s) per week     Drug use: Not on file     Sexual activity: Not on file     Other Topics Concern     Not on file     Social History Narrative       Family History   Problem Relation Age of Onset     Cancer Mother      Kidney disease Father      Heart disease Brother      Lung cancer Brother      Stroke Sister      Heart disease Sister        Current Outpatient Prescriptions   Medication Sig Dispense Refill     cyanocobalamin (VITAMIN B-12) 1000 MCG tablet Take 1,000 mcg by mouth daily.       digoxin (LANOXIN) 125 mcg tablet TAKE ONE TABLET BY MOUTH DAILY 90 tablet 3     diltiazem (CARDIZEM CD) 180 MG 24 hr capsule TAKE ONE CAPSULE BY MOUTH ONE TIME DAILY 90 capsule 3     FOLIC ACID/MV,FE,MIN (CENTRUM ORAL) Take 1 tablet by mouth daily.        gabapentin (NEURONTIN) 100 MG capsule Take 100 mg by mouth at bedtime as needed.  3     metoprolol succinate (TOPROL-XL) 25  "MG Take 1 tablet (25 mg total) by mouth daily. 90 tablet 3     nitroglycerin (NITROSTAT) 0.4 MG SL tablet Place 0.4 mg under the tongue as needed for chest pain.       tamsulosin (FLOMAX) 0.4 mg Cp24 Take 1 capsule (0.4 mg total) by mouth daily. 30 capsule 5     warfarin (COUMADIN) 2.5 MG tablet Take one-half to 1 tablet (1.25mg-2.5mg ) by mouth daily as directed. Adjust dose as directed per INR results. 30 tablet 3     No current facility-administered medications for this visit.        Objective:     /84 (Patient Site: Left Arm, Patient Position: Sitting, Cuff Size: Adult Regular)  Pulse 60  Resp 16  Ht 5' 10\" (1.778 m)  Wt 162 lb (73.5 kg)  BMI 23.24 kg/m2  162 lb (73.5 kg)   Repeat blood pressure 134/70    Physical Exam:    GENERAL APPEARANCE: alert, no apparent distress  HEENT: no scleral icterus or xanthelasma  NECK: jugular venous pressure within normal limits  CHEST: symmetric, the lungs are clear to auscultation  CARDIOVASCULAR: Irregular rhythm without significant murmur; no carotid bruits  Abdomen: No Organomegaly, masses, bruits, or tenderness. Bowels sounds are present      EXTREMITIES: no cyanosis, clubbing trace edema    Cardiac Testing:      Lab Results:    Lab Results   Component Value Date     12/29/2016    K 4.4 12/29/2016     12/29/2016    CO2 23 12/29/2016    BUN 23 12/29/2016    CREATININE 1.22 12/29/2016    CALCIUM 9.8 12/29/2016     Lab Results   Component Value Date    CHOL 148 06/28/2011    TRIG 46 06/28/2011    HDL 64 06/28/2011     BNP (pg/mL)   Date Value   11/29/2013 314 (H)     Creatinine (mg/dL)   Date Value   12/29/2016 1.22   11/30/2016 1.11   10/17/2016 1.02   06/07/2016 1.15     LDL Calculated (mg/dL)   Date Value   06/28/2011 74.8   02/09/2010 96.6     Lab Results   Component Value Date    WBC 9.9 12/29/2016    WBC 8.6 08/04/2015    HGB 13.1 (L) 12/29/2016    HCT 38.8 (L) 12/29/2016    MCV 96 12/29/2016     12/29/2016               This note has been " dictated using voice recognition software. Any grammatical or context distortions are unintentional and inherent to the software.      William Corbett M.D., F.A.C.C.  [facility]  [phone]

## 2021-06-10 NOTE — TELEPHONE ENCOUNTER
[facility] Nurse Triage Telephone Note      Provider: ANABELA Contreras  Facility: [facility]    Facility Type: [facility]    Caller: Jeremy- [facility]  [phone]  Call Back Number:  [facility] [phone]    Allergies: Ciprofloxacin; Erythromycin base; Oxycodone-acetaminophen; and Sulfa (sulfonamide antibiotics)    Reason for call: Cipro ophthalmic drops ordered, pt is allergic to Cipro.     Verbal Order/Direction given by Provider: DC Cipro. Start Tobradex .3/.1 % 2 drops three times a day OU x 7days.    Provider giving order: ANABELA Contreras    Verbal order given to: nurse Nelsy Scott RN

## 2021-06-10 NOTE — TELEPHONE ENCOUNTER
Received a faxed INR result for Raymond Zheng  From [facility] lab - [facility]  INR result dated 8/26/2020 is 2.05

## 2021-06-10 NOTE — TELEPHONE ENCOUNTER
ANTICOAGULATION  MANAGEMENT    Assessment     Today's INR result of 2.05 is Therapeutic (goal INR of 2.0-3.0)        Previous INR was Subtherapeutic    Missed doses may be affecting INR- unsure which day.    No new health/diet changes affecting INR    No new medication/supplements affecting INR    Continues to tolerate warfarin with no reported s/s of bleeding or thromboembolism       Plan:     Warfarin Dosing Orders:  Continue current warfarin dose    2.5 mg every Sat; 1.25 mg all other days         Next INR: 2 weeks.     Telephone orders given to nurseSeema.  Orders read back correctly.     Boo Quesada RN    Subjective/Objective:      Raymond Zheng, a 89 y.o. male is on warfarin. Facility nurse reports for Raymond:    Other anticoagulants: No    Medication changes: No     Missed warfarin doses since last INR: Yes, one dose.     Abnormal bleeding since last INR: No    New symptoms, injury or illness: No     Upcoming surgery, procedure or cardioversion: No    Recent INR Results:    Lab Results   Component Value Date    INR 2.05 (H) 08/26/2020    INR 1.61 (H) 08/12/2020    INR 2.04 (H) 07/15/2020       Anticoagulation Episode Summary     Current INR goal:   2.0-3.0   TTR:   75.3 % (1 y)   Next INR check:   9/9/2020   INR from last check:   2.05 (8/26/2020)   Weekly max warfarin dose:      Target end date:      INR check location:      Preferred lab:      Send INR reminders to:   [facility] (TCU/LTC/prison)    Indications    A-fib (H) (Resolved) [I48.91]           Comments:            Anticoagulation Care Providers     Provider Role Specialty Phone number    Delmi Regan NP Responsible Nurse Practitioner [phone]

## 2021-06-10 NOTE — PROGRESS NOTES
[facility]    DATE: 2020    NAME:  Raymond Zheng             :  1925  MRN: [id number]  CODE STATUS:  POLST on file    VISIT TYPE: Review Of Multiple Medical Conditions (chf, cognitive decline)     FACILITY:  [facility] [[id number]]       CHIEF COMPLAIN/REASON FOR VISIT:    Chief Complaint   Patient presents with     Review Of Multiple Medical Conditions     chf, cognitive decline               HISTORY OF PRESENT ILLNESS: Raymond Zheng is a 89 y.o. male who is a resident of [facility]. He has PMH of A fib, CVA, prostate cancer, HTN, knee osteoarthritis, low back pain, lumbar stenosis, pacemaker, vit b12 deficiency, memory issues, mild pulmonary HTN, dependent edema, dyspnea.     Today Mr. Zheng is seen for follow up on CHF and cognitive decline. Staff report he has been declining cognitive more recently. He seems to be more and more confused. He does wander on occasion. He is not as short of breath as he was before. His leg swelling is improving. His vitals have been stable and weights are down. He did have his echo on  and this showed mild LVH, severe left atrial and right atrial dilation, mild RV dilation, aortic valve sclerosis, moderate tricuspid regurg, mild pulmonary htn. He is seen in his apartment today. He had just come back from walking the small with his walker. He is noted to be hunched over his walker when he ambulates. He says he has been doing fine lately. He does not feel short of breath and did not look as short of breath with ambulation today. He thinks his leg swelling is better today. He denies any  Pain or other concerns.     REVIEW OF SYSTEMS:  PROBLEMS AND REVIEW OF SYSTEMS:   Review of Systems  Today on ROS:   Currently, no fever, chills, or rigors. Decreased vision and hearing. Denies any chest pain, headaches, palpitations, lightheadedness, dizziness. Appetite is good. Denies any GERD symptoms. Denies any  difficulty with swallowing, nausea, or vomiting.  Denies any abdominal pain, diarrhea or constipation. Denies any urinary symptoms. No insomnia.  Positive for forgetfulness, ambulates with walker,  no recent back pain, nursing manages and administers meds, confusion, shortness of breath improved, edema improving, worsening confusion, cognitive decline      Allergies   Allergen Reactions     Ciprofloxacin      Erythromycin Base      Oxycodone-Acetaminophen      Sulfa (Sulfonamide Antibiotics)      Current Outpatient Medications   Medication Sig     acetaminophen (TYLENOL) 500 MG tablet Take 1,000 mg by mouth 3 (three) times a day as needed for pain.      furosemide (LASIX) 40 MG tablet 1 TAB BY MOUTH DAILY (DX: EDEMA)     memantine (NAMENDA XR) 7 mg CSpX 1 CAP BY MOUTH EVERY BEDTIME (DX: MEMORY LOSS)     metoprolol succinate (TOPROL XL) 50 MG 24 hr tablet Take 1.5 tablets (75 mg total) by mouth daily.     nitroglycerin (NITROSTAT) 0.4 MG SL tablet Place 0.4 mg under the tongue as needed for chest pain.     polyethylene glycol (MIRALAX) 17 gram packet Take 17 g by mouth daily as needed.     potassium chloride (K-DUR,KLOR-CON) 20 MEQ tablet 1 TAB BY MOUTH DAILY (DX: HYPOKALEMIA)     SENEXON-S 8.6-50 mg tablet 2 TABS BY MOUTH EVERY BEDTIME     trolamine salicylate (ASPERCREME) 10 % cream Apply 1 application topically every 6 (six) hours as needed.            warfarin (COUMADIN) 2.5 MG tablet Take 0.5-1 tablets (1.25-2.5 mg total) by mouth daily. 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  Followed by [facility].       Vitamin B12 deficiency      Vitiligo            PHYSICAL EXAMINATION  Vitals:    08/05/20 0700   BP: (!) 137/96   Pulse: 73   Resp: 18   Temp: (!) 96.2  F (35.7  C)   SpO2: 90%   Weight: 143 lb (64.9 kg)       Today on physical exam:     GENERAL: Awake, Alert, not in any form of acute distress, answers questions appropriately, follows simple commands, conversant, pleasant  HEENT: Head is normocephalic with normal hair distribution. No evidence of trauma. Ears: No acute purulent discharge. Eyes: Conjunctivae pink with no scleral jaundice. Nose: Normal mucosa and septum. NECK: Supple with no cervical or supraclavicular lymphadenopathy. Trachea is midline. Glasses, hearing aids, [place]  CHEST: No tenderness or deformity, no crepitus  LUNG: dim but clear to bilateral bases to auscultation with good chest expansion. There are no crackles or wheezes, normal AP diameter. No cough noted, no shortness of breath noted today  BACK: chronic lumbar pain but denies today, no radiation, no numb/tingling, moderate kyphosis  CVS: irregularly irregular rhythm, systolic murmur,  2+ pulses symmetric in all extremities.  ABDOMEN: Rounded and soft, nontender to palpation, non distended, no masses, no organomegaly, good bowel sounds, no rebound or guarding, no peritoneal signs.   EXTREMITIES: darin discoloration ble, 1+ ble nonpitting edema  SKIN: Warm and dry,   NEUROLOGICAL: The patient is oriented to person, place and time. Confused at times Forgetful at times, no numb/tingling ble.  Pleasantly confused            LABS:   No results found for this or any previous visit (from the past 168 hour(s)).  Results for orders placed or performed in visit on 07/01/20   Basic Metabolic Panel   Result Value Ref Range    Sodium 138 136 - 145 mmol/L    Potassium 4.0 3.5 - 5.0 mmol/L    Chloride 103 98 - 107 mmol/L    CO2 26 22 - 31 mmol/L    Anion Gap, Calculation 9 5 - 18 mmol/L    Glucose 69 (L) 70 - 125 mg/dL     Calcium 9.6 8.5 - 10.5 mg/dL    BUN 35 (H) 8 - 28 mg/dL    Creatinine 1.43 (H) 0.70 - 1.30 mg/dL    GFR MDRD Af Amer 56 (L) >60 mL/min/1.73m2    GFR MDRD Non Af Amer 46 (L) >60 mL/min/1.73m2         Lab Results   Component Value Date    WBC 8.6 05/29/2019    HGB 11.2 (L) 05/29/2019    HCT 35.3 (L) 05/29/2019     05/29/2019     05/29/2019       Lab Results   Component Value Date    [id number] 796 10/02/2018     No results found for: HGBA1C  Lab Results   Component Value Date    INR 2.04 (H) 07/15/2020    INR 2.38 (H) 06/17/2020    INR 2.20 (H) 06/04/2020     No results found for: [id number]  Lab Results   Component Value Date    TSH 2.08 08/04/2015           ASSESSMENT/PLAN:    Fluid overload, dependent edema, Dyspnea, Acute on chronic CHF: 1+ ble non pitting edema, h/o pulmonary htn, intermittent dyspnea. shortness of breath with exertion improved recently, weekly [id number],  Completed metolazone, much improved. Weights back to baseline. Echo showed diastolic dysfunction, mild pulmonary htn, EF 60-65%, severe LA and RA dilation. Stable at this time. Continue lasix 40mg daily.   Atrial fibrillation:. No chest pain or palpitations. On metoprolol, warfarin. Anticoagulation clinic manages warfarin.   HTN: SBP 130s. On metoprolol. No recent concerns.   S/p pacemaker: Follows with cardiology, [employer]. No recent concerns.   Cognitive impairment, vascular dementia:  on namenda, slow progressive decline, more pronounced recently. Continue to provide safe environment.   H/o prostate cancer: No recent issues. On flomax. Radiation seeding in 2004. Last follow up in December. No concerns today. Dc flomax risk v benefit. Med reductions.  Lumbar stenosis, chronic back pain: No radiation, no numb/tingling. Tylenol prn, aspercreme prn. No recent concerns.   Macular degeneration, intraocular hemorrhage: Follows with Dr. Bill Figueroa. On atropine and prednisolone gtts. No concerns today.   H/o  CVA: On warfarin, no recent concerns.   Constipation: On colace prn and taking prune juice. senna daily prn. Stable recently.   CKD stage 2: cr 1.24, GFR 50 on 6/24. Previously 1.32, gfr 51.     Electronically signed by: Delmi Regan NP

## 2021-06-10 NOTE — TELEPHONE ENCOUNTER
ANTICOAGULATION  MANAGEMENT    Assessment     Today's INR result of 1.61 is Subtherapeutic (goal INR of 2.0-3.0)        Previous INR was Therapeutic    Missed doses may be affecting INR- missed on 8/9    No new health/diet changes affecting INR    No new medication/supplements affecting INR    Continues to tolerate warfarin with no reported s/s of bleeding or thromboembolism       Plan:     Warfarin Dosing Orders:  Give booster dose of 2.5 mg today only then continue current warfarin dose    2.5 mg every Sat; 1.25 mg all other days         Next INR: 2 weeks.    Telephone orders given to nurseAlicja.  Orders read back correctly.     Boo Quesada RN    Subjective/Objective:      Raymond Zheng, a 89 y.o. male is on warfarin. Facility nurse reports for Raymond:    Other anticoagulants: No    Medication changes: No     Missed warfarin doses since last INR: Yes, missed on 8/9.     Abnormal bleeding since last INR: No    New symptoms, injury or illness: No     Upcoming surgery, procedure or cardioversion: No    Recent INR Results:    Lab Results   Component Value Date    INR 1.61 (H) 08/12/2020    INR 2.04 (H) 07/15/2020    INR 2.38 (H) 06/17/2020       Anticoagulation Episode Summary     Current INR goal:   2.0-3.0   TTR:   77.6 % (1 y)   Next INR check:   8/26/2020   INR from last check:   1.61! (8/12/2020)   Weekly max warfarin dose:      Target end date:      INR check location:      Preferred lab:      Send INR reminders to:   [facility] (TCU/LTC/[facility])    Indications    A-fib (H) (Resolved) [I48.91]           Comments:            Anticoagulation Care Providers     Provider Role Specialty Phone number    Delmi Regan NP Responsible Nurse Practitioner [phone]

## 2021-06-11 NOTE — TELEPHONE ENCOUNTER
[facility] Nurse Triage Telephone Note      Provider: ANABELA Contreras  Facility: [facility]    Facility Type: [facility]    Caller: Bety  Call Back Number:  [phone]    Allergies: Ciprofloxacin; Erythromycin base; Oxycodone-acetaminophen; and Sulfa (sulfonamide antibiotics)    Reason for call: Nurse calling to report Heme 1, CMP, TSH, and HgbA1c results.  Notable meds:  Lasix 20mg daily, Potassium 10meq daily, Metoprolol ER 25mg daily.       Verbal Order/Direction given by Provider: No new orders at this time.      Provider giving order: VLAD Ca    Verbal order given to: Bety Beth RN

## 2021-06-11 NOTE — TELEPHONE ENCOUNTER
[facility] Nurse Triage Telephone Note      Provider: ANABELA Contreras  Facility: [facility]    Facility Type: [facility]    Caller: Bety  Call Back Number:  [phone]    Allergies: Ciprofloxacin; Erythromycin base; Oxycodone-acetaminophen; and Sulfa (sulfonamide antibiotics)    Reason for call: Nurse reporting that patient's right eye is red and itchy with some cream colored drainage.  Patient was treated in July to both eyes with Tobradex eye drops.       Verbal Order/Direction given by Provider: Tobradex 0.3% suspension, 2 drops to both eyes three times a day x 7 days.      Provider giving order: ANABELA Contreras    Verbal order given to: Bety Beth RN

## 2021-06-11 NOTE — PROGRESS NOTES
ASSESSMENT: Toe ulcers under calluses, hammertoe    PLAN: Calluses were debrided down to reveal ulcerations at the end of the right fourth toe and on the medial side of the left second toe.  Partial-thickness skin debridement done today.  No acute infection today.  These look better than last month, but are still present.  Toenails were debrided down today as well.  We talked about padding and protecting these areas to facilitate healing.  Follow-up here in 1 month.      SUBJECTIVE: He returns to the [facility] regarding calluses on his toes.  He has advanced hammertoe deformities which are leading to friction between the toes and distal irritation with weightbearing.  He has tried padding between the toes just recently. He has not seen bleeding or drainage.  He denies fever or chills.  He is not diabetic.    PHYSICAL EXAM:  General: Pleasant 89 y.o. male in no acute distress.  Vascular: DP pulses are diminished. PT pulses are absent. Pedal hair is absent. Feet are warm to the touch.  Cardiac: Pulse is regular.  Lymphatic: No edema at the ankles.  Neuro: Sensation in the feet is grossly intact to light touch.  Derm: Hyperkeratosis at the end of multiple toes.  These were debrided down.  Small ulcerations noted on the left second toe on the medial side of the proximal interphalangeal joint, and at the end of the right fourth toe.  No exposed tendon or bone.  Musculoskeletal: Hammertoes bilaterally with pressure between the first and second toes.

## 2021-06-11 NOTE — TELEPHONE ENCOUNTER
ANTICOAGULATION  MANAGEMENT    Assessment     Today's INR result of 2.43 is Therapeutic (goal INR of 2.0-3.0)        Previous INR was Therapeutic    Warfarin given as previously instructed    No new health/diet changes affecting INR    No new medication/supplements affecting INR    Continues to tolerate warfarin with no reported s/s of bleeding or thromboembolism       Plan:     Warfarin Dosing Orders:  Continue current warfarin dose    2.5 mg every Sat; 1.25 mg all other days         Next INR: 4 weeks.     Telephone orders given to nurseSeema.  Orders read back correctly.     Boo Quesada RN    Subjective/Objective:      Raymond Zheng, a 89 y.o. male is on warfarin. Facility nurse reports for Raymond:    Other anticoagulants: No    Medication changes: No     Missed warfarin doses since last INR: No     Abnormal bleeding since last INR: No    New symptoms, injury or illness: No     Upcoming surgery, procedure or cardioversion: No    Recent INR Results:    Lab Results   Component Value Date    INR 2.43 (H) 09/09/2020    INR 2.05 (H) 08/26/2020    INR 1.61 (H) 08/12/2020       Anticoagulation Episode Summary     Current INR goal:   2.0-3.0   TTR:   75.3 % (1 y)   Next INR check:   10/7/2020   INR from last check:   2.43 (9/9/2020)   Weekly max warfarin dose:      Target end date:      INR check location:      Preferred lab:      Send INR reminders to:   [facility] (TCU/LTC/[facility])    Indications    A-fib (H) (Resolved) [I48.91]           Comments:            Anticoagulation Care Providers     Provider Role Specialty Phone number    Delmi Regan NP Responsible Nurse Practitioner [phone]

## 2021-06-11 NOTE — PROGRESS NOTES
[facility]    DATE: 2020    NAME:  Raymond Zheng             :  1925  MRN: [id number]  CODE STATUS:  POLST on file    VISIT TYPE: Review Of Multiple Medical Conditions (chf, dementia)     FACILITY:  [facility] [[id number]]       CHIEF COMPLAIN/REASON FOR VISIT:    Chief Complaint   Patient presents with     Review Of Multiple Medical Conditions     chf, dementia               HISTORY OF PRESENT ILLNESS: Raymond Zheng is a 89 y.o. male who is a resident of [facility]. He has PMH of A fib, CVA, prostate cancer, HTN, knee osteoarthritis, low back pain, lumbar stenosis, pacemaker, vit b12 deficiency, memory issues, mild pulmonary HTN, dependent edema, dyspnea.     Today Mr. Zheng is seen for review of systems today for chf and dementia. He is seen in his apartment alone today sitting in recliner. He says he has been doing very well. He denies any recent breathing concerns. He has no pain and thinks he has been sleeping well. He denies any swelling in his legs lately. He has been getting around fine. His appetite is good. He denies any nausea or stomach upset problems. He has not had any chest pain or pressure recently. He thinks he is doing very well lately. He denies any recent illnesses or specialty appointments. Per staff has been doing fine recently. His leg edema has been much improved. He is on weekly weights but most recent was 142lbs, otherwise no others recorded this month. Vitals are stable today.     REVIEW OF SYSTEMS:  PROBLEMS AND REVIEW OF SYSTEMS:   Review of Systems  Today on ROS:   Currently, no fever, chills, or rigors. Decreased vision and hearing. Denies any chest pain, headaches, palpitations, lightheadedness, dizziness. Appetite is good. Denies any GERD symptoms. Denies any difficulty with swallowing, nausea, or vomiting.  Denies any abdominal pain, diarrhea or constipation. Denies any urinary symptoms. No insomnia.   Positive for forgetfulness, ambulates with walker, no recent back pain, nursing manages and administers meds, confusion, shortness of breath improved, no leg swelling today, unable to obtain further ROS due to cognition      Allergies   Allergen Reactions     Ciprofloxacin      Erythromycin Base      Oxycodone-Acetaminophen      Sulfa (Sulfonamide Antibiotics)      Current Outpatient Medications   Medication Sig     acetaminophen (TYLENOL) 500 MG tablet Take 1,000 mg by mouth 3 (three) times a day as needed for pain.      furosemide (LASIX) 40 MG tablet 1 TAB BY MOUTH DAILY (DX: EDEMA) (Patient taking differently: Take 20 mg by mouth daily. )     memantine (NAMENDA XR) 7 mg CSpX 1 CAP BY MOUTH EVERY BEDTIME (DX: MEMORY LOSS)     metoprolol succinate (TOPROL XL) 50 MG 24 hr tablet Take 1.5 tablets (75 mg total) by mouth daily.     nitroglycerin (NITROSTAT) 0.4 MG SL tablet Place 0.4 mg under the tongue as needed for chest pain.     polyethylene glycol (MIRALAX) 17 gram packet Take 17 g by mouth daily as needed.     potassium chloride (K-DUR,KLOR-CON) 20 MEQ tablet 1 TAB BY MOUTH DAILY (DX: HYPOKALEMIA) (Patient taking differently: Take 10 mEq by mouth daily. )     SENEXON-S 8.6-50 mg tablet 2 TABS BY MOUTH EVERY BEDTIME     trolamine salicylate (ASPERCREME) 10 % cream Apply 1 application topically every 6 (six) hours as needed.            warfarin (COUMADIN) 2.5 MG tablet Take 0.5-1 tablets (1.25-2.5 mg total) by mouth daily. 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 Followed by [facility].       Vitamin B12 deficiency      Vitiligo            PHYSICAL EXAMINATION  Vitals:    09/09/20 0700   BP: 127/75   Pulse: 86   Resp: 18   Temp: 97  F (36.1  C)   SpO2: 96%   Weight: 142 lb (64.4 kg)       Today on physical exam:     GENERAL: Awake, Alert, not in any form of acute distress, answers questions appropriately, follows simple commands, conversant, pleasant  HEENT: Head is normocephalic with normal hair distribution. No evidence of trauma. Ears: No acute purulent discharge. Eyes: Conjunctivae pink with no scleral jaundice. Nose: Normal mucosa and septum. NECK: Supple with no cervical or supraclavicular lymphadenopathy. Trachea is midline. Glasses, hearing aids, [place]  CHEST: No tenderness or deformity, no crepitus  LUNG: dim but clear to bilateral bases to auscultation with good chest expansion. There are no crackles or wheezes, normal AP diameter. No cough noted, no shortness of breath today  BACK: no lumbar pain, no radiation, no numb/tingling, moderate kyphosis  CVS: irregularly irregular rhythm, systolic murmur,  2+ pulses symmetric in all extremities.  ABDOMEN: Rounded and soft, nontender to palpation, non distended, no masses, no organomegaly, good bowel sounds, no rebound or guarding, no peritoneal signs.   EXTREMITIES: darin discoloration ble, no ble edema  SKIN: Warm and dry,   NEUROLOGICAL: The patient is oriented to person, place and time. Confused at times Forgetful at times, no numb/tingling ble.  Pleasantly confused            LABS:   Recent Results (from the past 168 hour(s))   INR   Result Value Ref Range    INR 2.43 (H) 0.90 - 1.10     Results for orders placed or performed in visit on 07/01/20   Basic Metabolic Panel   Result Value Ref Range    Sodium 138 136 - 145 mmol/L    Potassium 4.0 3.5 - 5.0 mmol/L    Chloride 103 98 - 107 mmol/L    CO2 26 22 - 31 mmol/L    Anion Gap, Calculation 9 5 - 18 mmol/L    Glucose 69 (L) 70 - 125 mg/dL    Calcium 9.6 8.5 - 10.5 mg/dL    BUN 35 (H)  8 - 28 mg/dL    Creatinine 1.43 (H) 0.70 - 1.30 mg/dL    GFR MDRD Af Amer 56 (L) >60 mL/min/1.73m2    GFR MDRD Non Af Amer 46 (L) >60 mL/min/1.73m2         Lab Results   Component Value Date    WBC 8.6 05/29/2019    HGB 11.2 (L) 05/29/2019    HCT 35.3 (L) 05/29/2019     05/29/2019     05/29/2019       Lab Results   Component Value Date    [id number] 10/02/2018     No results found for: HGBA1C  Lab Results   Component Value Date    INR 2.43 (H) 09/09/2020    INR 2.05 (H) 08/26/2020    INR 1.61 (H) 08/12/2020     No results found for: [id number]  Lab Results   Component Value Date    TSH 2.08 08/04/2015           ASSESSMENT/PLAN:    Chronic CHF: no ble edema, h/o pulmonary htn, no shortness of breath today, weekly [id number]. Stable recently. Echo showed diastolic dysfunction, mild pulmonary htn, EF 60-65%, severe LA and RA dilation. No further exacerbations, reduce lasix to 20mg daily, decrease kcl to 10meq daily today. Much improved.   Atrial fibrillation:. No chest pain or palpitations. On metoprolol, warfarin. Anticoagulation clinic manages warfarin. INR today 2.43, stable.   HTN: SBP 120s. On metoprolol. No recent concerns.   S/p pacemaker: Follows with cardiology, [facility]. No recent concerns. Last device check 8/19.   Cognitive impairment, vascular dementia:  on namenda, slow progressive decline, more pronounced recently. Continue to provide safe environment.   H/o prostate cancer: Radiation seeding in 2004. No recent follow ups, stable at this time.   Lumbar stenosis, chronic back pain: No radiation, no numb/tingling. Tylenol prn, aspercreme prn. No recent complaints of pain or use of tylenol. Stable today.   Macular degeneration, intraocular hemorrhage: Follows with Dr. Bill Figueroa. On atropine and prednisolone gtts. No concerns today.   H/o CVA: On warfarin, no recent concerns.   Constipation: On colace prn and taking prune juice. senna daily prn. Stable  recently.   CKD stage 2: cr 1.24, GFR 50 on 6/24. Cr 1.43 on 7/1. Stable.     Electronically signed by: Delmi Regan NP

## 2021-06-11 NOTE — TELEPHONE ENCOUNTER
Received a faxed INR result for Raymond Zheng  From [facility] lab - [facility]  INR result dated 9/9/2020 is 2.43

## 2021-06-11 NOTE — PROGRESS NOTES
ASSESSMENT: Toe ulcers under calluses, hammertoe    PLAN: Calluses were again debrided down to reveal ulcerations at the end of the right fourth toe and on the medial side of the left second toe.  Partial-thickness skin debridement done today.  No acute infection today. Toenails were debrided down today as well.  We talked about padding and protecting these areas to facilitate healing.  Follow-up here in 1 month.      SUBJECTIVE: He returns to the [facility] regarding calluses on his toes.  He has advanced hammertoe deformities which are leading to friction between the toes and distal irritation with weightbearing. He has not seen bleeding or drainage.  He denies fever or chills.  He is not diabetic.  Previous callus debridement has revealed ulcerations.    PHYSICAL EXAM:  General: Pleasant 89 y.o. male in no acute distress.  Vascular: DP pulses are diminished. PT pulses are absent. Pedal hair is absent. Feet are warm to the touch.  Cardiac: Pulse is regular.  Lymphatic: No edema at the ankles.  Neuro: Sensation in the feet is grossly intact to light touch.  Derm: Hyperkeratosis at the end of the right fourth toe and the medial side of the left second toe.  These were debrided down.  Small ulcerations noted on the left second toe on the medial side of the proximal interphalangeal joint, and at the end of the right fourth toe.    Musculoskeletal: Hammertoes bilaterally with pressure between the first and second toes.

## 2021-06-12 NOTE — PROGRESS NOTES
"  [facility]   Telephone Visit      CHIEF COMPLAINT/REASON FOR VISIT:  Chief Complaint   Patient presents with     Problem Visit     abnormal labs     Raymond Zheng is a 89 y.o. male who is being evaluated via a billable telephone visit due to the restrictions of the Covid-19 pandemic.     The patient has been notified of the following:     \"This is a telephone visit call between you and your physician/provider. We have found that certain health care needs can be provided without the need for a physical exam.  This service lets us provide the care you need with a short phone conversation.  If a prescription is necessary we can send it to the facility team.  If lab work is needed we can place an order through the facility team to have that test done at a later time.    If during the course of the call the physician/provider feels a telephone visit is not appropriate, you will not be charged for this service.\"     Raymond Zheng complains of    Chief Complaint   Patient presents with     Problem Visit     abnormal labs       HPI:   Raymond is a 89 y.o. male who is a resident of [facility].     Today Raymond was noted by nursing to have abnormal labs. His tsh was 6.4 today. It was last checked on 9/16 and was high at that time at 5.45. Per on call provider did not order any levothyroxine or new orders at the time. Repeat level was ordered by me when viewed labs during rounds. Per nursing they have not noted any increase in fatigue, shortness of breath, worsening edema recently. He did throw his gaurav stockings away (even the 2nd pair that was ordered for him) so he does not wear these. Nursing administers meds but does not assist with other services. He has not complained of anything to nursing lately. Raymond is called on his apartment phone today [phone]. He says he is not having any fatigue, difficulty sleeping, or trouble with bowels. He does not feel less hungry or having any " other symptoms of low thyroid function. He thinks his legs are fine and has no pain. He says he is doing fine. He is pleasantly confused and hard of hearing.     I have reviewed and updated the patient's Past Medical History, Social History, Family History and Medication List.    ALLERGIES  Ciprofloxacin, Erythromycin base, Oxycodone-acetaminophen, and Sulfa (sulfonamide antibiotics)    Review of Systems   ROS negative other than what discussed in HPI    There were no vitals filed for this visit.      Labs:  Discussed tsh levels today    Additional provider notes: Discussed with  nursing    Assessment/Plan:    ICD-10-CM    1. Chronic diastolic congestive heart failure (H)  I50.32    2. Other specified hypothyroidism  E03.8    3. Atrial fibrillation, unspecified type (H)  I48.91    4. Essential hypertension  I10    5. Vascular dementia without behavioral disturbance (H)  F01.50       Reviewed meds and no identified cause of new onset hypothyroid, continue current regimen  Start Levothyroxine 25 mcg po daily dx hypothyroid  Repeat Tsh in 6 weeks  Continue encouraging elevation  Of legs, wearing gaurav hose, ambulation for management of edema  Continue monitoring weights and notify  For weight gain >5lbs in 7 days  Hypothyroid may be contributing to A fib, but rates have been controlled    Phone call duration:  15 minutes with additional 25 min spent on counseling and coordination of care. Facility EMR records reviewed. Counseling and coordination with  Nursing regarding lab results, management of hypothyroid and chf.     Delmi Regan NP

## 2021-06-12 NOTE — TELEPHONE ENCOUNTER
ANTICOAGULATION  MANAGEMENT    Assessment     Today's INR result of 2.57 is Therapeutic (goal INR of 2.0-3.0)        Previous INR was Therapeutic    Warfarin given as previously instructed    No new health/diet changes affecting INR    No new medication/supplements affecting INR    Continues to tolerate warfarin with no reported s/s of bleeding or thromboembolism       Plan:     Warfarin Dosing Orders:  Continue current warfarin dose    2.5 mg every Sat; 1.25 mg all other days         Next INR: 4 weeks.    Telephone orders given to nurseSeema.  Orders read back correctly.     Boo Quesada RN    Subjective/Objective:      Raymond Zheng, a 89 y.o. male is on warfarin. Facility nurse reports for Raymond:    Other anticoagulants: No    Medication changes: No     Missed warfarin doses since last INR: No     Abnormal bleeding since last INR: No    New symptoms, injury or illness: No     Upcoming surgery, procedure or cardioversion: No    Recent INR Results:    Lab Results   Component Value Date    INR 2.57 (H) 11/04/2020    INR 2.64 (H) 10/07/2020    INR 2.43 (H) 09/09/2020       Anticoagulation Episode Summary     Current INR goal:  2.0-3.0   TTR:  75.3 % (1 y)   Next INR check:  12/2/2020   INR from last check:  2.57 (11/4/2020)   Weekly max warfarin dose:     Target end date:     INR check location:     Preferred lab:     Send INR reminders to:  [facility] (TCU/LTC/[facility])    Indications    A-fib (H) (Resolved) [I48.91]           Comments:           Anticoagulation Care Providers     Provider Role Specialty Phone number    Delmi Regan NP Responsible Nurse Practitioner [phone]

## 2021-06-12 NOTE — PROGRESS NOTES
ASSESSMENT: Onychauxis, callus, hammertoes, foot pain    PLAN: Calluses were debrided x2 and toenails were debrided manually and mechanically ×10.  Follow-up here in 1 month as needed.      SUBJECTIVE: He returns to the [facility] regarding calluses and thick painful toenails.  He has had ulcers under the calluses in the past.  He has advanced hammertoe deformities which have led to friction between the toes and distal irritation with weightbearing. He has not seen bleeding or drainage.  He denies fever or chills.  He is not diabetic.     PHYSICAL EXAM:  General: Pleasant 89 y.o. male in no acute distress.  Vascular: DP pulses are diminished. PT pulses are absent. Pedal hair is absent. Feet are warm to the touch.  Cardiac: Pulse is regular.  Lymphatic: No edema at the ankles.  Neuro: Sensation in the feet is grossly intact to light touch.  Derm: Hyperkeratosis at the end of the right fourth toe and the medial side of the left second toe.  These were debrided down.  The toenails are elongated, thickened and dystrophic with discoloration and subungual debris.  Musculoskeletal: Hammertoes bilaterally with pressure between the first and second toes.

## 2021-06-12 NOTE — TELEPHONE ENCOUNTER
ANTICOAGULATION  MANAGEMENT    Assessment     Today's INR result of 2.64 is Therapeutic (goal INR of 2.0-3.0)        Previous INR was Therapeutic    Missed doses may be affecting INR- missed on 9/20/2020.    No new health/diet changes affecting INR    No new medication/supplements affecting INR    Continues to tolerate warfarin with no reported s/s of bleeding or thromboembolism       Plan:     Warfarin Dosing Orders:  Continue current warfarin dose    2.5 mg every Sat; 1.25 mg all other days         Next INR: 4 weeks.    Telephone orders given to nurseSeema.  Orders read back correctly.     Boo Quesada RN    Subjective/Objective:      Raymond Zheng, a 89 y.o. male is on warfarin. Facility nurse reports for Raymond:    Other anticoagulants: No    Medication changes: No     Missed warfarin doses since last INR: Yes, missed on 9/20.     Abnormal bleeding since last INR: No    New symptoms, injury or illness: No     Upcoming surgery, procedure or cardioversion: No    Recent INR Results:    Lab Results   Component Value Date    INR 2.64 (H) 10/07/2020    INR 2.43 (H) 09/09/2020    INR 2.05 (H) 08/26/2020       Anticoagulation Episode Summary     Current INR goal:  2.0-3.0   TTR:  75.3 % (1 y)   Next INR check:  11/4/2020   INR from last check:  2.64 (10/7/2020)   Weekly max warfarin dose:     Target end date:     INR check location:     Preferred lab:     Send INR reminders to:  [facility] (TCU/LTC/[facility])    Indications    A-fib (H) (Resolved) [I48.91]           Comments:           Anticoagulation Care Providers     Provider Role Specialty Phone number    Delmi eRgan NP Responsible Nurse Practitioner [phone]

## 2021-06-13 NOTE — PROGRESS NOTES
Assessment:     Diagnoses and all orders for this visit:    Chronic bilateral low back pain without sciatica  -     Ambulatory referral to PT/OT  -     gabapentin (NEURONTIN) 100 MG capsule; Take 100 mg by mouth 3 (three) times a day.  Dispense: 90 capsule; Refill: 3    Acute right-sided low back pain with right-sided sciatica  -     Ambulatory referral to PT/OT    Right lumbar radiculitis  -     Ambulatory referral to PT/OT  -     gabapentin (NEURONTIN) 100 MG capsule; Take 100 mg by mouth 3 (three) times a day.  Dispense: 90 capsule; Refill: 3    Foraminal stenosis of lumbar region  -     Ambulatory referral to PT/OT    Scoliosis  -     Ambulatory referral to PT/OT     Raymond Zheng is a 89 y.o. y.o. male with past medical history significant for atrial fibrillation with cardiac pacemaker and on Coumadin therapy, prostate cancer, vitamin 12 deficiency, hypertension, CVA, knee osteoarthritis who presents today for follow-up regarding ongoing chronic bilateral back pain that is tolerable and improved with physical therapy.  Patient reports new pain into the right low back L3 and L4 location that radiates in an L4 dermatomal pattern.    Patient is neurologically intact on exam, does have positive straight leg raise on the right indicating radicular involvement.     Plan:     A shared decision making plan was used. The patient's values and choices were respected. Prior medical records from 10/4/2017 were reviewed today. The following represents what was discussed and decided upon by the provider and the patient.        -DIAGNOSTIC TESTS: Images were personally reviewed and interpreted.   --No need for further imaging at this time however he does not get relief  ----Lumbar spine MRI reveals lumbar scoliosis with multiple degenerative changes including spondylolisthesis at L1-2, L2-3, L3-4, L5-S1. He has old osteoporotic compression fracture L4-5 and L5-S1.  Mild central canal stenosis at L4-5 and L2-3 as well as  multilevel foraminal stenosis.    Foraminal stenosis most significant on the right L3-4 and L4-5 due to marked disc space narrowing from scoliosis.  Severe facet arthropathy at L3-4, L4-5, L5-S1, moderate at L2-3.    -INTERVENTIONS: Discussed that if he is not getting relief with gabapentin and physical therapy could trial a Right L3-4 and L4-5 transforaminal epidural steroid injection to see if we get the best results at his right lumbar radiculitis.  Again he does have foraminal stenosis at both levels.    -MEDICATIONS: Increase gabapentin to 100 mg 1 tablet 3 times daily for radicular pain.  Discussed side effects of medications and proper use. Patient verbalized understanding.    -PHYSICAL THERAPY: Referral to physical therapy also sent to [facility] for nerve glide exercises on the right.  Discussed the importance of core strengthening, ROM, stretching exercises with the patient and how each of these entities is important in decreasing pain.  Explained to the patient that the purpose of physical therapy is to teach the patient a home exercise program.  These exercises need to be performed every day in order to decrease pain and prevent future occurrences of pain.        -PATIENT EDUCATION:  20 minutes of total visit time was spent face to face with the patient today, 60 % of the visit was spent on counseling, education, and coordinating care.   -5 minutes spent outside of visit time, non-face-to-face time, reviewing chart.    -FOLLOW UP: Follow-up if symptoms are not improving in 4 weeks however sooner if pain is worsening and he would like to trial injection with Dr. Moyer.  Advised to contact clinic if symptoms worsen or change.    Subjective:     Raymond Zheng is a 89 y.o. male who presents today for follow-up regarding ongoing chronic bilateral low back pain that has improved recently with physical therapy ×1 session 10/10/2017.  He does report that starting 2 days ago he has had more  significant right-sided low back pain centered at the L3 and L4 level that does radiate into the right anterior thigh and lateral thigh as well as lateral shin without any new injury or trauma.  Currently this pain is a 6/10, a 4 to its best but again feels different than his chronic low back pain.    She denies numbness or tingling, denies weakness, denies recent trips or falls or bowel or bladder dysfunction.    Evaluation to Date: Severe facet arthropathy L3-4, L4-5, L5-S1. Spondylolisthesis multilevel as well as multilevel foraminal stenosis. Mild central canal stenosis L4-5 and L2-3.   Prior lumbar laminectomy at L3-4 and L4-5, as well as L2-3.      Treatment to Date: Lumbar surgery ×2 in 1980s.  Right shoulder bursa injection with Dr. Sipple 7/10/2015.  Currently not taking any medications for his back pain.  Physical therapy ×2 sessions [facility] [place] 11/18/2016, patient reports still doing home exercise program daily.  --Physical therapy ×1 session 10/10/2017 for low back pain with relief.      Bilateral L3-4 and L4-5 facet joint steroid injection 10/19/2016 with 90% relief chronic low back pain.  Prescribed hydrocodone 11/29/2016 22 acute low back pain per Dr. Chase, however he reports it does not give him much relief.  Patient reports typically he does not like to take extra medications.  Bilateral L2, L3, L4 radiofrequency ablation 1/4/2017 with significant relief he reports that about 70% of his bilateral low back pain.  Relief ×9 months however.      **Patient is taking Coumadin daily.    Patient Active Problem List   Diagnosis     Prostate cancer - 2002 - [facility] - Treated with radiation brachytherapy. Followed by [facility].     Pacemaker     Atrial fibrillation     Vitamin B12 deficiency     Knee osteoarthritis     CVA (cerebral infarction)     HTN (hypertension)     Former smoker     DNR (do not resuscitate)       Current Outpatient Prescriptions on File Prior to Encounter    Medication Sig Dispense Refill     cyanocobalamin (VITAMIN B-12) 1000 MCG tablet Take 1,000 mcg by mouth daily.       diclofenac sodium (VOLTAREN) 1 % Gel Apply 1 application topically 3 (three) times a day. Each application should be 2-4 grams. 1 Tube 3     digoxin (LANOXIN) 125 mcg tablet TAKE ONE TABLET BY MOUTH DAILY 90 tablet 3     diltiazem (CARDIZEM CD) 180 MG 24 hr capsule TAKE ONE CAPSULE BY MOUTH ONE TIME DAILY 90 capsule 3     FOLIC ACID/MV,FE,MIN (CENTRUM ORAL) Take 1 tablet by mouth daily.        metoprolol succinate (TOPROL-XL) 25 MG Take 1 tablet (25 mg total) by mouth daily. 90 tablet 3     nitroglycerin (NITROSTAT) 0.4 MG SL tablet Place 0.4 mg under the tongue as needed for chest pain.       tamsulosin (FLOMAX) 0.4 mg Cp24 TAKE 1 CAPSULE BY MOUTH DAILY. 30 capsule 5     warfarin (COUMADIN) 2.5 MG tablet Take 1/2-1 tablet (1.25-2.5 mg) by mouth daily as directed. Adjust dose per INR results. 30 tablet 4     [DISCONTINUED] gabapentin (NEURONTIN) 100 MG capsule Take 100 mg by mouth at bedtime as needed.  3     No current facility-administered medications on file prior to encounter.        Allergies   Allergen Reactions     Ciprofloxacin      Erythromycin Base      Oxycodone-Acetaminophen      Sulfa (Sulfonamide Antibiotics)        Past Medical History:   Diagnosis Date     Atrial fibrillation      CVA (cerebral infarction)      DNR (do not resuscitate) 10/18/2016     Former smoker      HTN (hypertension)      Knee osteoarthritis      Pacemaker      Prostate cancer     Treated with radiation brachytherapy. Followed by [facility].       Vitamin B12 deficiency      Vitiligo         Review of Systems  ROS: Positive for numbness and tingling, weakness, balance changes.  Specifically negative for bowel/bladder dysfunction, balance changes, headache, dizziness, foot drop, fevers, chills, appetite changes, nausea/vomiting, unexplained weight loss. Otherwise 13 systems reviewed are negative. Please see  the patient's intake questionnaire from today for details.    Reviewed Social, Family, Past Medical and Past Surgical history with patient, no significant changes noted since prior visit.     Objective:     BP (!) 189/95 (Patient Site: Left Arm, Patient Position: Sitting)  Pulse 67  Wt 155 lb (70.3 kg)  SpO2 91%  BMI 23.22 kg/m2    PHYSICAL EXAMINATION:    --CONSTITUTIONAL: Well developed, well nourished, healthy appearing individual.  --PSYCHIATRIC: Appropriate mood and affect. No difficulty interacting due to temper, social withdrawal, or memory issues.  --SKIN: Lumbar region is dry and intact. Sensation to light touch is intact in the bilateral L4, L5, and S1 dermatomes.  --RESPIRATORY: Normal rhythm and effort. No abnormal accessory muscle breathing patterns noted.   --MUSCULOSKELETAL:  Normal lumbar lordosis noted, no lateral shift.  --GROSS MOTOR: Easily arises from a seated position.   --LUMBAR SPINE:  Inspection reveals no evidence of deformity. Range of motion is not limited in lumbar flexion, extension, or lateral rotation. No tenderness to palpation. Straight leg raising in the supine position is negative to radicular pain. Sciatic notch non-tender.   --LOWER EXTREMITY MOTOR TESTING:  Plantar flexion left 5/5, right 5/5   Dorsiflexion left 5/5, right 5/5   Great toe MTP extension left 5/5, right 5/5  Knee flexion left 5/5, right 5/5  Knee extension left 5/5, right 5/5   Hip flexion left 5/5, right 5/5  Hip abduction left 5/5, right 5/5  Hip adduction left 5/5, right 5/5   --HIPS: Full range of motion bilaterally. Negative FABERs on the involved lower extremity.   --NEUROLOGIC: Bilateral patellar and achilles reflexes are physiologic and symmetric. Lower extremities are intact to light touch.     RESULTS:   Imaging: Lumbar spine imaging was reviewed today. The images were shown to the patient and the findings were explained using a spine model.      Ct Abdomen Pelvis Without Oral With Iv  Contrast  Result Date: 11/30/2016  CT ABDOMEN PELVIS WO ORAL W IV CONTRAST 11/30/2016 4:27 PM INDICATION: abdominal pain and bloating TECHNIQUE: CT abdomen and pelvis. Multiplanar reformation images (MPR). Dose reduction techniques were used. IV CONTRAST: Iohexol (Omni) 75mL COMPARISON: None. FINDINGS: LUNG BASES: Lung bases are clear. There is mild cardiomegaly without cardiac pacemaker in place. ABDOMEN: There are multiple small stones dependently in the gallbladder lumen. No biliary dilatation. No common duct stones are identified. The spleen, adrenal glands are normal. There is pancreatic atrophy with fatty replacement of the pancreatic head. No abnormal pancreatic mass. There are bilateral renal cysts, left larger than right the largest left renal cyst measures 3.8 cm in diameter. There is mild prominence of the left extrarenal pelvis no left renal or ureteral stones or ureteral dilatation. No right renal stones or solid masses. There is no evidence for bowel obstruction or free intraperitoneal air. Patient is status post anterior abdominal wall repair with multiple surgical clips. No recurrent hernia is seen. There is calcification of the abdominal aorta and renal artery origins end of the superior mesenteric artery origin. No aneurysm. No evidence for appendicitis. There is no abdominal lymphadenopathy. PELVIS: There are brachial therapy seeds in the prostate. No evidence for diverticulitis or abscess. No bowel containing hernias no pelvic lymphadenopathy. MUSCULOSKELETAL: There is lumbar scoliosis convex to the left with multilevel degenerative disc disease. There is mild compression of superior endplates of L4 and L5 which appears chronic no acute fractures are identified. Findings were called to Dr. Gamez at 1643 hours on 11/30/2016.   CONCLUSION:   1. No evidence for bowel obstruction, appendicitis, diverticulitis or abscess.   2. Bilateral renal cysts.   3. Arterial calcification without aneurysm.   4.  Cholelithiasis.   5. The cause of the patient's symptoms is not evident on this examination.      XR LUMBAR SPINE FLEX AND EXT 2 OR 3 VWS  10/17/2016   INDICATION: Spondylolisthesis, evaluate stability.COMPARISON: CT 10/11/2016.  FINDINGS: Flexion-extension views shows mild retrolisthesis of L1 on L2, L2 on L3 and L3 on L4. This is in extension.   These slightly reduce to normal alignment at the L1-2 and L2-3 levels with flexion. The L3-L4 level does not change in flexion. Flexion-extension views otherwise unremarkable. Stable chronic compression fracture inferior endplate of L1.      Ct Lumbar Spine Without Contrast  Result Date: 10/11/2016  [facility] CT LUMBAR SPINE WO CONTRAST 10/11/2016, 12:03 PM INDICATION: Low back pain. TECHNIQUE: Helical thin-section CT scan of the spine was performed using bone reconstruction algorithm. From the axial source data, sagittal and coronal thin reformats. Dose reduction techniques were used. IV CONTRAST: None. COMPARISON: None. FINDINGS: Five lumbar-type vertebral bodies. Mild thoracolumbar kyphosis. Slightly exaggerated lumbar lordosis. Degenerative left convex scoliosis measuring 28 degrees between L2 and L5. No fracture or destructive bony change. Paraspinous muscle volume is normal. Moderate aortic calcification, normal caliber. No visible adenopathy. Moderate degenerative change SI joints.   T12-L1: Mild disc height loss. Ventral interbody spurring. Moderate facet arthropathy. No central canal or foraminal stenosis.   L1-L2: 3-mm retrolisthesis. 4-mm right subluxation. Moderate to marked disc height loss. Gas in the disc space. Moderate circumferential disc bulge and interbody spur. Moderate facet arthropathy. Facet diastases. No central canal stenosis. Mild to moderate right foraminal narrowing. Moderate left foraminal stenosis from retrolisthesis.   L2-L3: 3-mm retrolisthesis. 5-mm right subluxation. Mild disc height loss. Gas in the disc space. Mild to moderate  circumferential interbody spur and disc bulge, asymmetric to the right. Previous wide laminectomy. Moderately advanced facet arthropathy. Mild central canal stenosis at the superior aspect of the disc space. Moderate bilateral foraminal stenoses.   L3-L4: 4-mm retrolisthesis. Marked right-sided disc height loss. Old superior endplate osteoporotic impaction. Severe facet arthropathy. Wide laminectomy. No central canal stenosis. Moderately severe bilateral foraminal stenoses.   L4-L5: Osteoporotic superior endplate impaction, which is old. Ballooning of the disc. Moderate to marked right-sided disc space narrowing with some gas in the disc space. Marked right-sided osteophytic spurring. Moderate ventral spurring. Severe facet arthropathy with facet diastases. Wide laminectomy. Mild central canal stenosis. Moderate right foraminal stenosis. Mild to moderate left foraminal narrowing.   L5-S1: 3-mm anterolisthesis. 3-mm left subluxation. Moderate disc height loss with gas in the disc space. Circumferential interbody spurring, which is more prominent on the left. Severe facet arthropathy. Partial laminectomy. No central canal stenosis. Mild right foraminal narrowing. Moderately severe left foraminal stenosis.   CONCLUSION:   1. Old osteoporotic endplate depressions of superior L4 and L5 endplates. No definite acute fracture.   2. Advanced degenerative changes throughout the lumbar spine. Previous multilevel laminectomy.   3. Mild central canal stenosis at L4-L5 and L2-L3.   4. Multilevel foraminal stenoses, as described.

## 2021-06-13 NOTE — PROGRESS NOTES
Assessment:     Diagnoses and all orders for this visit:    Arthropathy of lumbar facet joint  -     Ambulatory referral to PT/OT  -     diclofenac sodium (VOLTAREN) 1 % Gel; Apply 1 application topically 3 (three) times a day. Each application should be 2-4 grams.  Dispense: 1 Tube; Refill: 3    Chronic bilateral low back pain without sciatica  -     Ambulatory referral to PT/OT  -     diclofenac sodium (VOLTAREN) 1 % Gel; Apply 1 application topically 3 (three) times a day. Each application should be 2-4 grams.  Dispense: 1 Tube; Refill: 3    History of lumbar surgery  -     Ambulatory referral to PT/OT     Raymond Zheng is a 89 y.o. y.o. male with past medical history significant for atrial fibrillation with cardiac pacemaker and on Coumadin therapy, prostate cancer, vitamin 12 deficiency, hypertension, CVA, knee osteoarthritis who presents today for follow-up regarding chronic bilateral low back pain at the L3-4 and L4-5 level in which previously he got significant relief from bilateral L2, L3, L4 radiofrequency ablation back in general 2017 up until the last couple of weeks and then his pain is returned.    Patient denies radicular pain, is neurologically intact on exam however does have positive facet loading bilaterally.     Plan:     A shared decision making plan was used. The patient's values and choices were respected. Prior medical records from 1/31/17 to current were reviewed today. The following represents what was discussed and decided upon by the provider and the patient.        -DIAGNOSTIC TESTS: Images were personally reviewed and interpreted.   --Lumbar spine MRI and he does have multiple degenerative changes including spondylolisthesis at L1-2, L2-3, L3-4, L5-S1. He has old osteoporotic compression fracture L4-5 and L5-S1.  Mild central canal stenosis at L4-5 and L2-3 as well as multilevel foraminal stenosis.  Severe facet arthropathy at L3-4, L4-5, L5-S1, moderate at L2-3.    -INTERVENTIONS: Did  discuss trialing a repeat bilateral joint steroid injection if his pain is not improving with physical therapy and medications.  Too soon to trial repeat ablation, however if no lasting relief with facet injection will discuss with Dr. Moyer given his age at 89.    -MEDICATIONS: Prescribed diclofenac gel that he can utilize 3 times daily to bilateral low back pain for lumbar facet arthropathy type pain.  -He is unable to take oral NSAIDs due to Coumadin therapy.  Discussed side effects of medications and proper use. Patient verbalized understanding.    -PHYSICAL THERAPY: Referral to physical therapy [facility] here at the spine center sent for core strengthening and recurrent pain.  Discussed the importance of core strengthening, ROM, stretching exercises with the patient and how each of these entities is important in decreasing pain.  Explained to the patient that the purpose of physical therapy is to teach the patient a home exercise program.  These exercises need to be performed every day in order to decrease pain and prevent future occurrences of pain.        -PATIENT EDUCATION:  20 minutes of total visit time was spent face to face with the patient today, 60 % of the visit was spent on counseling, education, and coordinating care.   -5 minutes spent outside of visit time, non-face-to-face time, reviewing chart.    -FOLLOW UP: Follow-up in 6 weeks, sooner pain is worsening or new symptoms arise.  Advised to contact clinic if symptoms worsen or change.    Subjective:     Raymond Zheng is a 89 y.o. male who presents today for follow-up regarding return of his bilateral low back pain centered at the L3-S1 level midline that is recurrent, currently a 0/10 with sitting but does become more moderate with twisting and bending as well as back bending.  He reports he previously got significant relief with facet joint steroid injection short-lived however longer relief with radiofrequency ablation for 9 months  however patient reports his pain is returned for the last couple of weeks.    Evaluation to Date: Severe facet arthropathy L3-4, L4-5, L5-S1. Spondylolisthesis multilevel as well as multilevel foraminal stenosis. Mild central canal stenosis L4-5 and L2-3.   Prior lumbar laminectomy at L3-4 and L4-5, as well as L2-3.      Treatment to Date: Lumbar surgery ×2 in 1980s.  Right shoulder bursa injection with Dr. Sipple 7/10/2015.  Currently not taking any medications for his back pain.  Physical therapy ×2 sessions [facility] 11/18/2016, patient reports still doing home exercise program daily.     Bilateral L3-4 and L4-5 facet joint steroid injection 10/19/2016 with 90% relief chronic low back pain.  Prescribed hydrocodone 11/29/2016 22 acute low back pain per Dr. Chase, however he reports it does not give him much relief.  Patient reports typically he does not like to take extra medications.  Bilateral L2, L3, L4 radiofrequency ablation 1/4/2017 with significant relief he reports that about 70% of his bilateral low back pain.  Relief ×9 months however.      **Patient is taking Coumadin daily.       Patient Active Problem List   Diagnosis     Prostate cancer - 2002 - [place] [facility] - Treated with radiation brachytherapy. Followed by [facility].     Pacemaker     Atrial fibrillation     Vitamin B12 deficiency     Knee osteoarthritis     CVA (cerebral infarction)     HTN (hypertension)     Former smoker     DNR (do not resuscitate)       Current Outpatient Prescriptions on File Prior to Encounter   Medication Sig Dispense Refill     cyanocobalamin (VITAMIN B-12) 1000 MCG tablet Take 1,000 mcg by mouth daily.       digoxin (LANOXIN) 125 mcg tablet TAKE ONE TABLET BY MOUTH DAILY 90 tablet 3     diltiazem (CARDIZEM CD) 180 MG 24 hr capsule TAKE ONE CAPSULE BY MOUTH ONE TIME DAILY 90 capsule 3     FOLIC ACID/MV,FE,MIN (CENTRUM ORAL) Take 1 tablet by mouth daily.        gabapentin (NEURONTIN) 100 MG capsule Take  100 mg by mouth at bedtime as needed.  3     metoprolol succinate (TOPROL-XL) 25 MG Take 1 tablet (25 mg total) by mouth daily. 90 tablet 3     nitroglycerin (NITROSTAT) 0.4 MG SL tablet Place 0.4 mg under the tongue as needed for chest pain.       tamsulosin (FLOMAX) 0.4 mg Cp24 TAKE 1 CAPSULE BY MOUTH DAILY. 30 capsule 5     warfarin (COUMADIN) 2.5 MG tablet Take one-half to 1 tablet (1.25mg-2.5mg ) by mouth daily as directed. Adjust dose as directed per INR results. 30 tablet 3     No current facility-administered medications on file prior to encounter.        Allergies   Allergen Reactions     Ciprofloxacin      Erythromycin Base      Oxycodone-Acetaminophen      Sulfa (Sulfonamide Antibiotics)        Past Medical History:   Diagnosis Date     Atrial fibrillation      CVA (cerebral infarction)      DNR (do not resuscitate) 10/18/2016     Former smoker      HTN (hypertension)      Knee osteoarthritis      Pacemaker      Prostate cancer     Treated with radiation brachytherapy. Followed by [facility].       Vitamin B12 deficiency      Vitiligo         Review of Systems  ROS: Positive for back pain.  Specifically negative for bowel/bladder dysfunction, balance changes, headache, dizziness, foot drop, fevers, chills, appetite changes, nausea/vomiting, unexplained weight loss. Otherwise 13 systems reviewed are negative. Please see the patient's intake questionnaire from today for details.    Reviewed Social, Family, Past Medical and Past Surgical history with patient, no significant changes noted since prior visit.     Objective:     /67 (Patient Site: Left Arm, Patient Position: Sitting)  Pulse 100  Wt 156 lb (70.8 kg)  SpO2 97%  BMI 22.38 kg/m2    PHYSICAL EXAMINATION:    --CONSTITUTIONAL: Well developed, well nourished, healthy appearing individual.  --PSYCHIATRIC: Appropriate mood and affect. No difficulty interacting due to temper, social withdrawal, or memory issues.  --SKIN: Lumbar region is dry  and intact. Sensation to light touch is intact in the bilateral L4, L5, and S1 dermatomes.  --RESPIRATORY: Normal rhythm and effort. No abnormal accessory muscle breathing patterns noted.   --MUSCULOSKELETAL:  Normal lumbar lordosis noted, no lateral shift.  --GROSS MOTOR: Easily arises from a seated position.   --LUMBAR SPINE:  Inspection reveals no evidence of deformity. Range of motion is not limited in lumbar flexion, does have increased pain with lumbar extension and lateral rotation simultaneously however.  No tenderness to palpation. Straight leg raising in the supine position is negative to radicular pain. Sciatic notch non-tender.   --LOWER EXTREMITY MOTOR TESTING:  Plantar flexion left 5/5, right 5/5   Dorsiflexion left 5/5, right 5/5   Great toe MTP extension left 5/5, right 5/5  Knee flexion left 5/5, right 5/5  Knee extension left 5/5, right 5/5   Hip flexion left 5/5, right 5/5  Hip abduction left 5/5, right 5/5  Hip adduction left 5/5, right 5/5   --HIPS: Full range of motion bilaterally. Negative FABERs on the involved lower extremity.   --NEUROLOGIC: Bilateral patellar and achilles reflexes are physiologic and symmetric. Lower extremities are intact to light touch.     RESULTS:   Imaging: MRI of the lumbar spine was reviewed today. The images were shown to the patient and the findings were explained using a spine model.      Ct Abdomen Pelvis Without Oral With Iv Contrast  Result Date: 11/30/2016  CT ABDOMEN PELVIS WO ORAL W IV CONTRAST 11/30/2016 4:27 PM INDICATION: abdominal pain and bloating TECHNIQUE: CT abdomen and pelvis. Multiplanar reformation images (MPR). Dose reduction techniques were used. IV CONTRAST: Iohexol (Omni) 75mL COMPARISON: None. FINDINGS: LUNG BASES: Lung bases are clear. There is mild cardiomegaly without cardiac pacemaker in place. ABDOMEN: There are multiple small stones dependently in the gallbladder lumen. No biliary dilatation. No common duct stones are identified. The  spleen, adrenal glands are normal. There is pancreatic atrophy with fatty replacement of the pancreatic head. No abnormal pancreatic mass. There are bilateral renal cysts, left larger than right the largest left renal cyst measures 3.8 cm in diameter. There is mild prominence of the left extrarenal pelvis no left renal or ureteral stones or ureteral dilatation. No right renal stones or solid masses. There is no evidence for bowel obstruction or free intraperitoneal air. Patient is status post anterior abdominal wall repair with multiple surgical clips. No recurrent hernia is seen. There is calcification of the abdominal aorta and renal artery origins end of the superior mesenteric artery origin. No aneurysm. No evidence for appendicitis. There is no abdominal lymphadenopathy. PELVIS: There are brachial therapy seeds in the prostate. No evidence for diverticulitis or abscess. No bowel containing hernias no pelvic lymphadenopathy. MUSCULOSKELETAL: There is lumbar scoliosis convex to the left with multilevel degenerative disc disease. There is mild compression of superior endplates of L4 and L5 which appears chronic no acute fractures are identified. Findings were called to Dr. Gamez at 1643 hours on 11/30/2016.   CONCLUSION:   1. No evidence for bowel obstruction, appendicitis, diverticulitis or abscess.   2. Bilateral renal cysts.   3. Arterial calcification without aneurysm.   4. Cholelithiasis.   5. The cause of the patient's symptoms is not evident on this examination.      XR LUMBAR SPINE FLEX AND EXT 2 OR 3 VWS  10/17/2016   INDICATION: Spondylolisthesis, evaluate stability.COMPARISON: CT 10/11/2016.  FINDINGS: Flexion-extension views shows mild retrolisthesis of L1 on L2, L2 on L3 and L3 on L4. This is in extension.   These slightly reduce to normal alignment at the L1-2 and L2-3 levels with flexion. The L3-L4 level does not change in flexion. Flexion-extension views otherwise unremarkable. Stable chronic  compression fracture inferior endplate of L1.      Ct Lumbar Spine Without Contrast  Result Date: 10/11/2016  [facility] CT LUMBAR SPINE WO CONTRAST 10/11/2016, 12:03 PM INDICATION: Low back pain. TECHNIQUE: Helical thin-section CT scan of the spine was performed using bone reconstruction algorithm. From the axial source data, sagittal and coronal thin reformats. Dose reduction techniques were used. IV CONTRAST: None. COMPARISON: None. FINDINGS: Five lumbar-type vertebral bodies. Mild thoracolumbar kyphosis. Slightly exaggerated lumbar lordosis. Degenerative left convex scoliosis measuring 28 degrees between L2 and L5. No fracture or destructive bony change. Paraspinous muscle volume is normal. Moderate aortic calcification, normal caliber. No visible adenopathy. Moderate degenerative change SI joints.   T12-L1: Mild disc height loss. Ventral interbody spurring. Moderate facet arthropathy. No central canal or foraminal stenosis. L1-L2: 3-mm retrolisthesis. 4-mm right subluxation. Moderate to marked disc height loss. Gas in the disc space. Moderate circumferential disc bulge and interbody spur. Moderate facet arthropathy. Facet diastases. No central canal stenosis. Mild to moderate right foraminal narrowing. Moderate left foraminal stenosis from retrolisthesis. L2-L3: 3-mm retrolisthesis. 5-mm right subluxation. Mild disc height loss. Gas in the disc space. Mild to moderate circumferential interbody spur and disc bulge, asymmetric to the right. Previous wide laminectomy. Moderately advanced facet arthropathy. Mild central canal stenosis at the superior aspect of the disc space. Moderate bilateral foraminal stenoses. L3-L4: 4-mm retrolisthesis. Marked right-sided disc height loss. Old superior endplate osteoporotic impaction. Severe facet arthropathy. Wide laminectomy. No central canal stenosis. Moderately severe bilateral foraminal stenoses. L4-L5: Osteoporotic superior endplate impaction, which is old.  Ballooning of the disc. Moderate to marked right-sided disc space narrowing with some gas in the disc space. Marked right-sided osteophytic spurring. Moderate ventral spurring. Severe facet arthropathy with facet diastases. Wide laminectomy. Mild central canal stenosis. Moderate right foraminal stenosis. Mild to moderate left foraminal narrowing. L5-S1: 3-mm anterolisthesis. 3-mm left subluxation. Moderate disc height loss with gas in the disc space. Circumferential interbody spurring, which is more prominent on the left. Severe facet arthropathy. Partial laminectomy. No central canal stenosis. Mild right foraminal narrowing. Moderately severe left foraminal stenosis.   CONCLUSION:   1. Old osteoporotic endplate depressions of superior L4 and L5 endplates. No definite acute fracture.   2. Advanced degenerative changes throughout the lumbar spine. Previous multilevel laminectomy.   3. Mild central canal stenosis at L4-L5 and L2-L3.   4. Multilevel foraminal stenoses, as described.

## 2021-06-13 NOTE — PROGRESS NOTES
[facility]    DATE: 2020    NAME:  Raymond Zheng             :  1925  MRN: [id number]  CODE STATUS:  POLST on file    VISIT TYPE: Review Of Multiple Medical Conditions (chf)     FACILITY:  [facility] [[id number]]       CHIEF COMPLAIN/REASON FOR VISIT:    Chief Complaint   Patient presents with     Review Of Multiple Medical Conditions     chf               HISTORY OF PRESENT ILLNESS: Raymond Zheng is a 89 y.o. male who is a resident of [facility]. He has PMH of A fib, CVA, prostate cancer, HTN, knee osteoarthritis, low back pain, lumbar stenosis, pacemaker, vit b12 deficiency, memory issues, mild pulmonary HTN, dependent edema, dyspnea.     Today Mr. Zheng is seen for review of systems for CHF. He is seen sitting in chair alone today. He says he has been doing well. He denies any shortness of breath and thinks his legs are not swollen today. He is not having any dizziness or recent falls. He says he is not having any pain today. He says he sleeps well and his appetite is good. He is not having any other recent concerns. Per staff weights have been stable at 153lbs. He has not appeared to be short of breath recently. He ambulates around the facility with his walker. He is independent for ADLs. He has not had any leg swelling recently.     REVIEW OF SYSTEMS:  PROBLEMS AND REVIEW OF SYSTEMS:   Review of Systems  Today on ROS:   Currently, no fever, chills, or rigors. Decreased vision and hearing. Denies any chest pain, headaches, palpitations, lightheadedness, dizziness. Appetite is good. Denies any GERD symptoms. Denies any difficulty with swallowing, nausea, or vomiting.  Denies any abdominal pain, diarrhea or constipation. Denies any urinary symptoms. No insomnia.  Positive for forgetfulness, ambulates with walker, no recent back pain, nursing manages and administers meds, confusion, no recent shortness of breath, no leg swelling  today, unable to obtain further ROS due to cognition      Allergies   Allergen Reactions     Ciprofloxacin      Erythromycin Base      Oxycodone-Acetaminophen      Sulfa (Sulfonamide Antibiotics)      Current Outpatient Medications   Medication Sig     acetaminophen (TYLENOL) 500 MG tablet Take 1,000 mg by mouth 3 (three) times a day as needed for pain.      furosemide (LASIX) 20 MG tablet 1 TAB BY MOUTH DAILY (DX: CHF)     furosemide (LASIX) 40 MG tablet 1 TAB BY MOUTH DAILY (DX: EDEMA) (Patient taking differently: Take 20 mg by mouth daily. )     levothyroxine (SYNTHROID, LEVOTHROID) 25 MCG tablet Take 1 tablet (25 mcg total) by mouth daily.     memantine (NAMENDA XR) 7 mg CSpX 1 CAP BY MOUTH EVERY BEDTIME (DX:MEMORY LOSS)     metoprolol succinate (TOPROL-XL) 50 MG 24 hr tablet TAKE 1.5 TABS (75 MG) ORALLY ONCE DAILY     nitroglycerin (NITROSTAT) 0.4 MG SL tablet Place 0.4 mg under the tongue as needed for chest pain.     polyethylene glycol (MIRALAX) 17 gram packet Take 17 g by mouth daily as needed.     potassium chloride (K-DUR,KLOR-CON) 10 MEQ tablet 1 TAB BY MOUTH DAILY (DX:HYPOKALEMIA)     potassium chloride (K-DUR,KLOR-CON) 20 MEQ tablet 1 TAB BY MOUTH DAILY (DX: HYPOKALEMIA) (Patient taking differently: Take 10 mEq by mouth daily. )     SENEXON-S 8.6-50 mg tablet 2 TABS BY MOUTH EVERY BEDTIME     trolamine salicylate (ASPERCREME) 10 % cream Apply 1 application topically every 6 (six) hours as needed.            warfarin (COUMADIN) 2.5 MG tablet Take 0.5-1 tablets (1.25-2.5 mg total) by mouth daily. Adjust dose per INR results as directed     Past Medical History:    Past Medical History:   Diagnosis Date     Anticoagulation goal of INR 2 to 3      Atrial fibrillation (H)      Bleeding diathesis (H) - Secondary to warfarin therapy      CVA (cerebral infarction)      DNR (do not resuscitate) 10/18/2016     Former smoker      HTN (hypertension)      Knee osteoarthritis      Onychomycosis      Pacemaker       Prostate cancer (H)     Treated with radiation brachytherapy. Followed by [facility].       Vitamin B12 deficiency      Vitiligo            PHYSICAL EXAMINATION  Vitals:    11/25/20 1049   Weight: 153 lb (69.4 kg)       Today on physical exam:     GENERAL: Awake, Alert, not in any form of acute distress, answers questions appropriately, follows simple commands, conversant, pleasant  HEENT: Head is normocephalic with normal hair distribution. No evidence of trauma. Ears: No acute purulent discharge. Eyes: Conjunctivae pink with no scleral jaundice. Nose: Normal mucosa and septum. NECK: Supple with no cervical or supraclavicular lymphadenopathy. Trachea is midline. Glasses, hearing aids, [place]  CHEST: No tenderness or deformity, no crepitus  LUNG: dim but clear to bilateral bases to auscultation with good chest expansion. There are no crackles or wheezes, normal AP diameter. No cough noted, no shortness of breath   BACK: no lumbar pain, no radiation, no numb/tingling, moderate kyphosis  CVS: irregularly irregular rhythm, systolic murmur,  2+ pulses symmetric in all extremities.  ABDOMEN: Rounded and soft, nontender to palpation, non distended, no masses, no organomegaly, good bowel sounds, no rebound or guarding, no peritoneal signs.   EXTREMITIES: darin discoloration ble, no ble edema  SKIN: Warm and dry,   NEUROLOGICAL: The patient is oriented to person, place and time. Confused at times Forgetful at times, no numb/tingling ble.  Pleasantly confused            LABS:   Recent Results (from the past 168 hour(s))   Thyroid Stimulating Hormone (TSH)   Result Value Ref Range    TSH 4.46 0.30 - 5.00 uIU/mL     Results for orders placed or performed in visit on 07/01/20   Basic Metabolic Panel   Result Value Ref Range    Sodium 138 136 - 145 mmol/L    Potassium 4.0 3.5 - 5.0 mmol/L    Chloride 103 98 - 107 mmol/L    CO2 26 22 - 31 mmol/L    Anion Gap, Calculation 9 5 - 18 mmol/L    Glucose 69 (L) 70 - 125 mg/dL    Calcium  9.6 8.5 - 10.5 mg/dL    BUN 35 (H) 8 - 28 mg/dL    Creatinine 1.43 (H) 0.70 - 1.30 mg/dL    GFR MDRD Af Amer 56 (L) >60 mL/min/1.73m2    GFR MDRD Non Af Amer 46 (L) >60 mL/min/1.73m2         Lab Results   Component Value Date    WBC 9.2 09/16/2020    HGB 11.2 (L) 09/16/2020    HCT 36.1 (L) 09/16/2020     (H) 09/16/2020     09/16/2020       Lab Results   Component Value Date    [id number] 10/02/2018     Lab Results   Component Value Date    HGBA1C 5.8 (H) 09/16/2020     Lab Results   Component Value Date    INR 2.57 (H) 11/04/2020    INR 2.64 (H) 10/07/2020    INR 2.43 (H) 09/09/2020     No results found for: [id number]  Lab Results   Component Value Date    TSH 4.46 11/25/2020           ASSESSMENT/PLAN:    Chronic CHF, pulmonary hypertension: no ble edema, no shortness of breath recently, weekly [id number]. Echo showed diastolic dysfunction, mild pulmonary htn, EF 60-65%, severe LA and RA dilation. On lasix 20mg daily, kcl to 10meq daily. No recent concerns. Appears stable today.   Atrial fibrillation:. No chest pain or palpitations. On metoprolol, warfarin. Anticoagulation clinic manages warfarin. INR 11/4 2.57. stable.   HTN: SBP 130s. On metoprolol. No recent concerns.   S/p pacemaker: Follows with cardiology, [employer]. No recent concerns. Last device check 11/18.   Cognitive impairment, vascular dementia:  on namenda, slow progressive decline. Continue to provide safe environment. No recent concerns.   H/o prostate cancer: Radiation seeding in 2004. No recent follow ups, stable at this time.   Lumbar stenosis, chronic back pain: Tylenol prn, aspercreme prn. No recent complaints of pain. Doing very well.   Macular degeneration, intraocular hemorrhage: Follows with Dr. Bill Figueroa. On atropine and prednisolone gtts. No recent concerns.   H/o CVA: On warfarin, no recent concerns.   Constipation: On colace prn, prune juice. senna daily prn. Stable recently.   CKD stage 3: cr  1.24, GFR 50 on 6/24. Cr 1.43 on 7/1. Cr 1.32 gfr 51 on 9/16.     Electronically signed by: Delmi Regan NP

## 2021-06-13 NOTE — PROGRESS NOTES
Anticoagulation Annual Referral Renewal Review    Raymond Zheng's chart reviewed for annual renewal of referral to anticoagulation monitoring.        Criteria for anticoagulation nurse and/or pharmacist renewal met   Warfarin indication: Atrial Fibrillation Yes, per indication   Current with INR monitoring/compliant Yes Yes   Date of last office visit 1/25/20 Yes, had office visit within last year   Time in Therapeutic Range (TTR) 75 % Yes, TTR > 60%       Raymond Zheng met all criteria for anticoagulation management program initiated renewal.  New INR standing orders and anticoagulation referral renewal placed.      Valentine Bettencourt RN  4:05 PM

## 2021-06-13 NOTE — PROGRESS NOTES
Pacemaker interrogation showed 584 high VR episodes  Review RS shows AF with elevated VR; often 155-175 BMP  Irregular ; did not look like VT  VR>120 about 10%  May need more VR control medications

## 2021-06-13 NOTE — PROGRESS NOTES
[facility] Progress Note    Assessment:  1.  Chronic atrial fibrillation without associated symptoms.  A number of years ago he did have some fluid retention and higher heart rates which have been well controlled on the current combination of digoxin, diltiazem and metoprolol.  He is tolerating this combination of medications.  He continues to be on warfarin and prefers warfarin over 1 of the newer anticoagulant agents or consideration of left atrial appendage occlusion device.  We are going to plan echocardiography as he did have some ventricular complexes reported on the recent pacemaker check.    2.  Hypertension.  Blood pressure today was mildly elevated.  Review of recent blood pressure at his primary care physician's office demonstrates a blood pressure has been reasonable.  He is asked to monitor his blood pressure periodically.  He will have an INR follow-up November 3, 2017 and his primary care physician's office.        Plan: As outlined above with follow-up in 6 months.    1. A-fib  Echo Complete   2. Pacemaker           An After Visit Summary was printed and given to the patient.    Subjective:    Raymond Zheng is a 89 y.o. male who returned for a planned  follow up visit.  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  He has had no specific complaints of chest pain, shortness of breath, dizziness or lightheadedness.  Recent pacemaker check demonstrated persistent atrial fibrillation.  Device check suggested some nonsustained ventricular tachycardia.  He has no symptoms.  He had an echocardiogram most recently 2012 that demonstrated normal systolic function.  We are going to plan follow-up echocardiogram.  He continues to have his INR checked regularly through Dr. Marie's office.  Review of Systems:   General: WNL  Eyes: WNL  Ears/Nose/Throat: WNL  Lungs: WNL  Heart: WNL  Stomach: Constipation  Bladder: Frequent Urination at Night  Muscle/Joints:  WNL  Skin: WNL  Nervous System: WNL  Mental Health: WNL     Blood: WNL      Problem List:    Patient Active Problem List   Diagnosis     Prostate cancer - 2002 - [place] [facility] - Treated with radiation brachytherapy. Followed by [facility].     Pacemaker     Atrial fibrillation     Vitamin B12 deficiency     Knee osteoarthritis     CVA (cerebral infarction)     HTN (hypertension)     Former smoker     DNR (do not resuscitate)       Social History     Social History     Marital status:      Spouse name: Sydni     Number of children: 0     Years of education: N/A     Occupational History      Retired     Social History Main Topics     Smoking status: Former Smoker     Smokeless tobacco: Never Used     Alcohol use 1.5 oz/week     3 Standard drinks or equivalent per week     Drug use: Not on file     Sexual activity: Not on file     Other Topics Concern     Not on file     Social History Narrative    .  Lives in home with wife.        Former .       Family History   Problem Relation Age of Onset     Cancer Mother      Kidney disease Father      Heart disease Brother      Lung cancer Brother      Stroke Sister      Heart disease Sister        Current Outpatient Prescriptions   Medication Sig Dispense Refill     cyanocobalamin (VITAMIN B-12) 1000 MCG tablet Take 1,000 mcg by mouth daily.       diclofenac sodium (VOLTAREN) 1 % Gel Apply 1 application topically 3 (three) times a day. Each application should be 2-4 grams. 1 Tube 3     digoxin (LANOXIN) 125 mcg tablet TAKE ONE TABLET BY MOUTH DAILY 90 tablet 3     diltiazem (CARDIZEM CD) 180 MG 24 hr capsule TAKE ONE CAPSULE BY MOUTH ONE TIME DAILY 90 capsule 3     FOLIC ACID/MV,FE,MIN (CENTRUM ORAL) Take 1 tablet by mouth daily.        gabapentin (NEURONTIN) 100 MG capsule Take 100 mg by mouth at bedtime as needed.  3     metoprolol succinate (TOPROL-XL) 25 MG Take 1 tablet (25 mg total) by mouth daily. 90 tablet 3     nitroglycerin (NITROSTAT)  "0.4 MG SL tablet Place 0.4 mg under the tongue as needed for chest pain.       tamsulosin (FLOMAX) 0.4 mg Cp24 TAKE 1 CAPSULE BY MOUTH DAILY. 30 capsule 5     warfarin (COUMADIN) 2.5 MG tablet Take 1/2-1 tablet (1.25-2.5 mg) by mouth daily as directed. Adjust dose per INR results. 30 tablet 4     No current facility-administered medications for this visit.        Objective:     /86 (Patient Site: Left Arm, Patient Position: Sitting, Cuff Size: Adult Regular)  Pulse 68  Resp 16  Ht 5' 8.5\" (1.74 m)  Wt 156 lb (70.8 kg)  BMI 23.37 kg/m2  156 lb (70.8 kg)       Physical Exam:    GENERAL APPEARANCE: alert, no apparent distress  HEENT: no scleral icterus or xanthelasma  NECK: jugular venous pressure within normal limits.  CHEST: symmetric, the lungs are clear to auscultation  CARDIOVASCULAR:Irregular rhythm, soft systolic murmur.; no carotid bruits  Abdomen: No Organomegaly, masses, bruits, or tenderness. Bowels sounds are present      EXTREMITIES: no cyanosis, clubbing or edema    Cardiac Testing:        Lab Results:    Lab Results   Component Value Date     (L) 08/25/2017    K 4.7 08/25/2017     08/25/2017    CO2 25 08/25/2017    BUN 15 08/25/2017    CREATININE 1.01 08/25/2017    CALCIUM 10.2 08/25/2017     Lab Results   Component Value Date    CHOL 148 06/28/2011    TRIG 46 06/28/2011    HDL 64 06/28/2011     BNP (pg/mL)   Date Value   11/29/2013 314 (H)     Creatinine (mg/dL)   Date Value   08/25/2017 1.01   12/29/2016 1.22   11/30/2016 1.11   10/17/2016 1.02     LDL Calculated (mg/dL)   Date Value   06/28/2011 74.8   02/09/2010 96.6     Lab Results   Component Value Date    WBC 8.3 08/25/2017    WBC 8.6 08/04/2015    HGB 13.3 (L) 08/25/2017    HCT 39.4 (L) 08/25/2017    MCV 95 08/25/2017     08/25/2017               This note has been dictated using voice recognition software. Any grammatical or context distortions are unintentional and inherent to the software.      William Corbett, " M.D., F.A.C..  [facility]  [phone]

## 2021-06-13 NOTE — PROGRESS NOTES
ASSESSMENT: Onychauxis, callus, hammertoes, foot pain    PLAN: Callus debrided x1 and toenails were debrided manually and mechanically ×10.  Follow-up here in 1 month as needed.      SUBJECTIVE: He returns to the [facility] regarding calluses and thick painful toenails.  He has had ulcers under the calluses in the past.  He has advanced hammertoe deformities which have led to friction between the toes and distal irritation with weightbearing. He has not seen bleeding or drainage.  He denies fever or chills.  He is not diabetic.     PHYSICAL EXAM:  General: Pleasant 89 y.o. male in no acute distress.  Vascular: DP pulses are diminished. PT pulses are absent. Pedal hair is absent. Feet are warm to the touch.  Cardiac: Pulse is regular.  Lymphatic: No edema at the ankles.  Neuro: Sensation in the feet is grossly intact to light touch.  Derm: Hyperkeratosis at the end of the right fourth toe. That was debrided down to intact skin.  The toenails are elongated, thickened and dystrophic with discoloration and subungual debris.  Musculoskeletal: Hammertoes bilaterally with pressure between the first and second toes.

## 2021-06-13 NOTE — PROGRESS NOTES
[facility]    DATE: 2020    NAME:  Raymond Zheng             :  1925  MRN: [id number]  CODE STATUS:  POLST on file    VISIT TYPE: Review Of Multiple Medical Conditions (chf, hypertension, cognitive impairment)     FACILITY:  [facility] [[id number]]       CHIEF COMPLAIN/REASON FOR VISIT:    Chief Complaint   Patient presents with     Review Of Multiple Medical Conditions     chf, hypertension, cognitive impairment               HISTORY OF PRESENT ILLNESS: Raymond Zheng is a 89 y.o. male who is a resident of [facility]. He has PMH of A fib, CVA, prostate cancer, HTN, knee osteoarthritis, low back pain, lumbar stenosis, pacemaker, vit b12 deficiency, memory issues, mild pulmonary HTN, dependent edema, dyspnea.     Today Mr. Zheng is seen for review of systems for CHF, hypertension, cognitive impairment. He is seen sitting in chair today. He has been ambulating around the facility with his walker. He denies any shortness of breath today and thinks his legs have not been very swollen. He says he has been feeling good. He is not having any dizziness and does not recall falling at all recently. He says he has not had any recent headaches or visual changes. He sleeps well and appetite is good. He denies any other issues recently. Nursing reports his cognition does appear to be slowly declining. He is overall doing pretty well though and weights are stable at 153lbs. He has not had any recent concerns.     REVIEW OF SYSTEMS:  PROBLEMS AND REVIEW OF SYSTEMS:   Review of Systems  Today on ROS:   Currently, no fever, chills, or rigors. Decreased vision and hearing. Denies any chest pain, headaches, palpitations, lightheadedness, dizziness. Appetite is good. Denies any GERD symptoms. Denies any difficulty with swallowing, nausea, or vomiting.  Denies any abdominal pain, diarrhea or constipation. Denies any urinary symptoms. No insomnia.  Positive  for forgetfulness, ambulates with walker, no recent back pain, nursing manages and administers meds, confusion, no recent shortness of breath, no leg swelling today, unable to obtain further ROS due to cognition      Allergies   Allergen Reactions     Ciprofloxacin      Erythromycin Base      Oxycodone-Acetaminophen      Sulfa (Sulfonamide Antibiotics)      Current Outpatient Medications   Medication Sig     acetaminophen (TYLENOL) 500 MG tablet Take 1,000 mg by mouth 3 (three) times a day as needed for pain.      furosemide (LASIX) 20 MG tablet 1 TAB BY MOUTH DAILY (DX: CHF)     furosemide (LASIX) 40 MG tablet 1 TAB BY MOUTH DAILY (DX: EDEMA) (Patient taking differently: Take 20 mg by mouth daily. )     levothyroxine (SYNTHROID, LEVOTHROID) 25 MCG tablet Take 1 tablet (25 mcg total) by mouth daily.     memantine (NAMENDA XR) 7 mg CSpX 1 CAP BY MOUTH EVERY BEDTIME (DX:MEMORY LOSS)     metoprolol succinate (TOPROL-XL) 50 MG 24 hr tablet TAKE 1.5 TABS (75 MG) ORALLY ONCE DAILY     nitroglycerin (NITROSTAT) 0.4 MG SL tablet Place 0.4 mg under the tongue as needed for chest pain.     polyethylene glycol (MIRALAX) 17 gram packet Take 17 g by mouth daily as needed.     potassium chloride (K-DUR,KLOR-CON) 10 MEQ tablet 1 TAB BY MOUTH DAILY (DX:HYPOKALEMIA)     potassium chloride (K-DUR,KLOR-CON) 20 MEQ tablet 1 TAB BY MOUTH DAILY (DX: HYPOKALEMIA) (Patient taking differently: Take 10 mEq by mouth daily. )     SENEXON-S 8.6-50 mg tablet 2 TABS BY MOUTH EVERY BEDTIME     trolamine salicylate (ASPERCREME) 10 % cream Apply 1 application topically every 6 (six) hours as needed.            warfarin (COUMADIN) 2.5 MG tablet Take 0.5-1 tablets (1.25-2.5 mg total) by mouth daily. Adjust dose per INR results as directed     Past Medical History:    Past Medical History:   Diagnosis Date     Anticoagulation goal of INR 2 to 3      Atrial fibrillation (H)      Bleeding diathesis (H) - Secondary to warfarin therapy      CVA (cerebral  infarction)      DNR (do not resuscitate) 10/18/2016     Former smoker      HTN (hypertension)      Knee osteoarthritis      Onychomycosis      Pacemaker      Prostate cancer (H)     Treated with radiation brachytherapy. Followed by [facility].       Vitamin B12 deficiency      Vitiligo            PHYSICAL EXAMINATION  Vitals:    12/02/20 0700   Weight: 153 lb (69.4 kg)       Today on physical exam:     GENERAL: Awake, Alert, not in any form of acute distress, answers questions appropriately, follows simple commands, conversant, pleasant  HEENT: Head is normocephalic with normal hair distribution. No evidence of trauma. Ears: No acute purulent discharge. Eyes: Conjunctivae pink with no scleral jaundice. Nose: Normal mucosa and septum. NECK: Supple with no cervical or supraclavicular lymphadenopathy. Trachea is midline. Glasses, hearing aids, [place]  CHEST: No tenderness or deformity, no crepitus  LUNG: dim but clear to bilateral bases to auscultation with good chest expansion. There are no crackles or wheezes, normal AP diameter. No cough noted, no shortness of breath   BACK: no lumbar pain, no radiation, no numb/tingling, moderate kyphosis  CVS: irregularly irregular rhythm, systolic murmur,  2+ pulses symmetric in all extremities.  ABDOMEN: Rounded and soft, nontender to palpation, non distended, no masses, no organomegaly, good bowel sounds, no rebound or guarding, no peritoneal signs.   EXTREMITIES: darin discoloration ble, no ble edema  SKIN: Warm and dry,   NEUROLOGICAL: The patient is oriented to person, place and time. Confused at times Forgetful at times, no numb/tingling ble.  Pleasantly confused            LABS:   Recent Results (from the past 168 hour(s))   INR   Result Value Ref Range    INR 2.35 (H) 0.90 - 1.10     Results for orders placed or performed in visit on 07/01/20   Basic Metabolic Panel   Result Value Ref Range    Sodium 138 136 - 145 mmol/L    Potassium 4.0 3.5 - 5.0 mmol/L    Chloride 103  98 - 107 mmol/L    CO2 26 22 - 31 mmol/L    Anion Gap, Calculation 9 5 - 18 mmol/L    Glucose 69 (L) 70 - 125 mg/dL    Calcium 9.6 8.5 - 10.5 mg/dL    BUN 35 (H) 8 - 28 mg/dL    Creatinine 1.43 (H) 0.70 - 1.30 mg/dL    GFR MDRD Af Amer 56 (L) >60 mL/min/1.73m2    GFR MDRD Non Af Amer 46 (L) >60 mL/min/1.73m2         Lab Results   Component Value Date    WBC 9.2 09/16/2020    HGB 11.2 (L) 09/16/2020    HCT 36.1 (L) 09/16/2020     (H) 09/16/2020     09/16/2020       Lab Results   Component Value Date    [id number] 796 10/02/2018     Lab Results   Component Value Date    HGBA1C 5.8 (H) 09/16/2020     Lab Results   Component Value Date    INR 2.35 (H) 12/02/2020    INR 2.57 (H) 11/04/2020    INR 2.64 (H) 10/07/2020     No results found for: [id number]  Lab Results   Component Value Date    TSH 4.46 11/25/2020           ASSESSMENT/PLAN:    Chronic CHF, pulmonary hypertension: no ble edema, no shortness of breath recently, weekly [id number]. Echo showed diastolic dysfunction, mild pulmonary htn, EF 60-65%, severe LA and RA dilation. On lasix 20mg daily, kcl to 10meq daily. Weights appear to continue to be stable. No recent signs of fluid overload or exacerbation. Will continue current med regimen.   Atrial fibrillation:. No chest pain or palpitations. On metoprolol, warfarin. Anticoagulation clinic manages warfarin. INR has been stable.   HTN: SBP 140s. On metoprolol. No recent concerns.   S/p pacemaker: Follows with cardiology, [employer]. Last device check 11/18.   Cognitive impairment, vascular dementia:  on namenda, slow progressive decline. Continue to provide safe environment. No recent concerns.   H/o prostate cancer: Radiation seeding in 2004. No recent follow ups, stable at this time.   Lumbar stenosis, chronic back pain: Tylenol prn, aspercreme prn. No recent complaints of pain. No recent concerns.   Macular degeneration, intraocular hemorrhage: Follows with Dr. Bill Figueroa.  On atropine and prednisolone gtts. No recent concerns.   H/o CVA: On warfarin, no recent concerns.   Constipation: On colace prn, prune juice. senna daily prn. Stable recently.   CKD stage 3: cr 1.24, GFR 50 on 6/24. Cr 1.43 on 7/1. Cr 1.32 gfr 51 on 9/16.     Electronically signed by: Delmi Regan NP

## 2021-06-13 NOTE — TELEPHONE ENCOUNTER
ANTICOAGULATION  MANAGEMENT    Assessment     Today's INR result of 2.3 is Therapeutic (goal INR of 2.0-3.0)        Previous INR was Therapeutic    Warfarin given as previously instructed    No new health/diet changes affecting INR    No new medication/supplements affecting INR    Continues to tolerate warfarin with no reported s/s of bleeding or thromboembolism       Plan:     Warfarin Dosing Orders:  Continue current warfarin dose 2.5 mg daily on sat; and 1.25 mg daily rest of week  (0 % change)    Next INR: 4 weeks    Telephone orders given to nurseSeema.  Orders read back correctly.     Maxwell Florence RN    Subjective/Objective:      Raymond Zheng, a 89 y.o. male is on warfarin. Facility nurse reports for Raymond:    Other anticoagulants: No    Medication changes: No     Missed warfarin doses since last INR: No     Abnormal bleeding since last INR: No    New symptoms, injury or illness: No     Upcoming surgery, procedure or cardioversion: No    Recent INR Results:    Lab Results   Component Value Date    INR 2.35 (H) 12/02/2020    INR 2.57 (H) 11/04/2020    INR 2.64 (H) 10/07/2020       Anticoagulation Episode Summary     Current INR goal:  2.0-3.0   TTR:  75.3 % (1 y)   Next INR check:  12/30/2020   INR from last check:  2.35 (12/2/2020)   Weekly max warfarin dose:     Target end date:     INR check location:     Preferred lab:     Send INR reminders to:  [facility] ([facility])    Indications    A-fib (H) (Resolved) [I48.91]           Comments:           Anticoagulation Care Providers     Provider Role Specialty Phone number    Delmi Regan NP Responsible Nurse Practitioner [phone]

## 2021-06-13 NOTE — PROGRESS NOTES
F/U  --C/O right hip pain radiates down the lateral/anterior thigh to the knee x couple days  --Rates pain 6/10  --PT x 1 sessions [facility], 10/10/17 for lumbar pain     Medication  --Diclofenac gel PRN  --Gabapentin 100 mg 1 cap QHS PRN

## 2021-06-13 NOTE — TELEPHONE ENCOUNTER
[facility] Nurse Triage Telephone Note      Provider: ANABELA Contreras  Facility: [facility]    Facility Type: [facility]    Caller: Alicja   Call Back Number:  [phone]    Allergies: Ciprofloxacin, Erythromycin base, Oxycodone-acetaminophen, and Sulfa (sulfonamide antibiotics)    Reason for call: TSH 4.46 last 6.4 on levotheyroxine 25mcg daily     Verbal Order/Direction given by Provider: NNO    Provider giving order: ANABELA Contreras    Verbal order given to: Alicja Jules RN

## 2021-06-13 NOTE — PROGRESS NOTES
F/U   --Last seen 1/31/17  --C/O midline low back pain, no change  --Worse with twisting for bending forward per pt  --Rates pain 4/10  --PT x 2 sessions [facility] 11/18/14 for back     Medication  --Gabapentin 100 mg 1 cap QHS PRN

## 2021-06-13 NOTE — PROGRESS NOTES
[facility]   [facility]-Pelvic Initial Evaluation    Patient Name: Raymond Zheng  Date of evaluation: 10/10/2017  Referral Diagnosis: Arthropathy of lumbar facet joint  Referring provider: Isaura Pugh C*  Visit Diagnosis:     ICD-10-CM    1. Chronic bilateral low back pain without sciatica M54.5     G89.29    2. Poor posture R29.3        Assessment:    Patient presents with very mild onset of chronic LBP secondary to postural weakness. Per report, patient is noting pain after he has done forward bending for 30 minutes. He underwent PT about a year ago with this therapist and continues with the home exercises from that previous POC. Upon evaluation, patient was demonstrating minor LE and core weakness but otherwise negative exam. Patient prefers to work on the exercises independently at home. PT revised home program and review but instructed the patient to call therapist if he has any questions or if his pain worsens.     Pt. is appropriate for skilled PT intervention as outlined in the Plan of Care (POC).  Pt. is a good candidate for skilled PT services to improve pain levels and function.    Goals:  Pt. will be independent with home exercise program in : Comment  Comment:: one visit  Pt will: reduce MARGARITA by 30% in 8 weeks    Patient's expectations/goals are realistic.    Barriers to Learning or Achieving Goals:  Age.        Plan / Patient Instructions:        Plan of Care:   Authorization / Certification Start Date: 10/10/17  Authorization / Certification End Date: 01/10/18  Authorization / Certification Number of Visits: 12  Communication with: Referral Source  Patient Related Instruction: Nature of Condition;Posture;Treatment plan and rationale  Times per Week: 1  Number of Weeks: 12  Number of Visits: 12  Precautions / Restrictions : Patient has pacemake no e-stim  Therapeutic Exercise: Strengthening;Stretching;ROM  Neuromuscular Reeducation: posture  Manual Therapy: myofascial release;soft tissue  mobilization  Modalities: TENS    Plan for next visit: No further follow-up planned at this point but will hold chart open for on month     Subjective:           History of Present Illness:    Raymond is a 89 y.o. male who presents to therapy today with complaints of Chronic LBP that has worsened in the last week or so. He notes no cause for the recent increase in his symptoms. He notes increased pain with 30 minutes of forward bending but otherwise pain free. He denies numbness or tingling.    Pertinent medical history: CVA, pacemaker, OA, prostate CA    Worse with: forward bending    Pain Ratin  Pain rating at best: 0  Pain rating at worst: 2-3/10  Pain description: pain    Functional limitations are described as occurring with:   bending  repetitive movements    Patient reports benefit from:  rest           Objective:      Note: Items left blank indicates the item was not performed or not indicated at the time of the evaluation.    Patient Outcome Measures :    Modified Oswestry Low Back Pain Disablity Questionnaire  in %: 38   Scores range from 0-100%, where a score of 0% represents minimal pain and maximal function. The minimal clinically important difference is a score reduction of 12%.    Examination  1. Chronic bilateral low back pain without sciatica     2. Poor posture       Involved side: Bilateral  Posture Observation:      General sitting posture is  Fair to poor.  General standing posture is poor.  Lumbopelvic complex: Moderately decreased lumbar lordosis    Lumbar ROM:    Date: 10/10/17     *Indicate scale AROM AROM AROM   Lumbar Flexion 32 cm finger tip to floor, before bending knee but able to get close to the ground     Lumbar Extension Major loss       Right Left Right Left Right Left   Lumbar Sidebending Moderate loss but no pain  Moderate loss but no pain       Lumbar Rotation         Thoracic Flexion      Thoracic Extension      Thoracic Sidebending         Thoracic Rotation           Lower  Extremity Strength:     Date: 10/10/17     LE strength/5 Right Left Right Left Right Left   Hip Flexion (L1-3) 4 4       Hip Extension (L5-S1)         Hip Abduction (L4-5)         Hip Adduction (L2-3)         Hip External Rotation         Hip Internal Rotation         Knee Extension (L3-4)         Knee Flexion         Ankle Dorsiflexion (L4-5)         Great Toe Extension (L5)         Ankle Plantar flexion (S1)         Abdominals        Sensation    Equal and normal B       Reflex Testing  Lumbar Dermatomes Right Left UE Reflexes Right Left   Iliac Crest and Groin (L1)   Biceps (C5-6)     Anterior Medial Thigh (L2)   Brachioradialis (C5-6)     Anterior Thigh, Medial Epicondyle Femur (L3)   Triceps (C7-8)     Lateral Thigh, Anterior Knee, Medial Leg/Malleolus (L4)   Patsy s test     Lateral Leg, Dorsal Foot (L5)   LE Reflexes     Lateral Foot (S1)   Patellar (L3-4)     Posterior Leg (S2)   Achilles (S1-2)     Other:   Babinski Response       Palpation: no tenderness    Lumbar Special Tests:     Lumbar Special Tests Right Left SI Tests Right  Left   Quadrant test   SI Compression     Straight leg raise 70- 70- SI Distraction     Crossover response - - POSH Test     Slump - - Sacral Thrust     Sit-up test  FADIR     Trunk extensor endurance test  Resisted Abduction     Prone instability test  Other:     Pubic shotgun  Other:       Repeated Motion Testing:  Does not peripheralize    Passive Mobility - Joint Integrity:  Hypomobile      Treatment Today     TREATMENT MINUTES COMMENTS   Evaluation 20    Self-care/ Home management     Manual therapy     Neuromuscular Re-education     Therapeutic Activity     Therapeutic Exercises 20 Reviewed HEP, progressed home exercises and instructed the patient to call in case he feels he needs more visits.   Gait training     Modality__________________                Total 40    Blank areas are intentional and mean the treatment did not include these items.     PT Evaluation Code:  (Please list factors)  Patient History/Comorbidities: cardiac pacemaker, prostate CA, OA, CVA  Examination: lumbar  Clinical Presentation: stable  Clinical Decision Making: low    Patient History/  Comorbidities Examination  (body structures and functions, activity limitations, and/or participation restrictions) Clinical Presentation Clinical Decision Making (Complexity)   No documented Comorbidities or personal factors 1-2 Elements Stable and/or uncomplicated Low   1-2 documented comorbidities or personal factor 3 Elements Evolving clinical presentation with changing characteristics Moderate   3-4 documented comorbidities or personal factors 4 or more Unstable and unpredictable High                Ana Brand  10/10/2017  9:02 AM

## 2021-06-14 NOTE — PROGRESS NOTES
[facility]    DATE: 2021    NAME:  Raymond Zheng             :  1925  MRN: [id number]  CODE STATUS:  POLST on file    VISIT TYPE: Problem Visit (positive covid)     FACILITY:  [facility] [[id number]]       CHIEF COMPLAIN/REASON FOR VISIT:    Chief Complaint   Patient presents with     Problem Visit     positive covid               HISTORY OF PRESENT ILLNESS: Raymond Zheng is a 89 y.o. male who is a resident of [facility]. He has PMH of A fib, CVA, prostate cancer, HTN, knee osteoarthritis, low back pain, lumbar stenosis, pacemaker, vit b12 deficiency, memory issues, mild pulmonary HTN, dependent edema, dyspnea.     Today Mr. Zheng is seen for concerns of being positive for COVID 19. He is currently being isolated in his apartment. Nursing reports they believe he has been doing well and has not had symptoms. He is seen in his apartment today. He is dressed and appears well kempt. He denies any shortness of breath or cough today. He has not had a sore throat or runny nose. He denies any loose stools. He says he has been feeling good and denies any fevers, chills, dizziness, nausea, or other issues. He denies feeling fatigued or weaker than usual. On review of chart he has been afebrile and o2 sats have been stable. Nursing has been checking his temp and o2 sats twice daily.     REVIEW OF SYSTEMS:  PROBLEMS AND REVIEW OF SYSTEMS:   Review of Systems  Today on ROS:   Currently, no fever, chills, or rigors. Decreased vision and hearing. Denies any chest pain, headaches, palpitations, lightheadedness, dizziness. Appetite is good. Denies any GERD symptoms. Denies any difficulty with swallowing, nausea, or vomiting.  Denies any abdominal pain, diarrhea or constipation. Denies any urinary symptoms. No insomnia.  Positive for forgetfulness, ambulates with walker, no recent back pain, nursing manages and administers meds, no shortness of breath,  minimal leg swelling, no cough, unable to obtain further ROS due to cognition      Allergies   Allergen Reactions     Ciprofloxacin      Erythromycin Base      Oxycodone-Acetaminophen      Sulfa (Sulfonamide Antibiotics)      Current Outpatient Medications   Medication Sig     acetaminophen (TYLENOL) 500 MG tablet Take 1,000 mg by mouth 3 (three) times a day as needed for pain.      furosemide (LASIX) 20 MG tablet 1 TAB BY MOUTH DAILY (DX: CHF)     furosemide (LASIX) 40 MG tablet 1 TAB BY MOUTH DAILY (DX: EDEMA) (Patient taking differently: Take 20 mg by mouth daily. )     levothyroxine (SYNTHROID, LEVOTHROID) 25 MCG tablet Take 1 tablet (25 mcg total) by mouth daily.     memantine (NAMENDA XR) 7 mg CSpX 1 CAP BY MOUTH EVERY BEDTIME (DX:MEMORY LOSS)     metoprolol succinate (TOPROL-XL) 50 MG 24 hr tablet TAKE 1.5 TABS (75 MG) ORALLY ONCE DAILY     nitroglycerin (NITROSTAT) 0.4 MG SL tablet Place 0.4 mg under the tongue as needed for chest pain.     polyethylene glycol (MIRALAX) 17 gram packet Take 17 g by mouth daily as needed.     potassium chloride (K-DUR,KLOR-CON) 10 MEQ tablet 1 TAB BY MOUTH DAILY (DX:HYPOKALEMIA)     potassium chloride (K-DUR,KLOR-CON) 20 MEQ tablet 1 TAB BY MOUTH DAILY (DX: HYPOKALEMIA) (Patient taking differently: Take 10 mEq by mouth daily. )     SENEXON-S 8.6-50 mg tablet 2 TABS BY MOUTH EVERY BEDTIME     trolamine salicylate (ASPERCREME) 10 % cream Apply 1 application topically every 6 (six) hours as needed.            warfarin (COUMADIN) 2.5 MG tablet Take 0.5-1 tablets (1.25-2.5 mg total) by mouth daily. 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 Followed by [facility].       Vitamin B12 deficiency      Vitiligo            PHYSICAL EXAMINATION  Vitals:    01/08/21 0824   Temp: 97.4  F (36.3  C)   SpO2: 99%       Today on physical exam:     GENERAL: Awake, Alert, not in any form of acute distress, answers questions appropriately, follows simple commands, conversant, pleasant  HEENT: Head is normocephalic with normal hair distribution. No evidence of trauma. Ears: No acute purulent discharge. Eyes: Conjunctivae pink with no scleral jaundice. Nose: Normal mucosa and septum. NECK: Supple with no cervical or supraclavicular lymphadenopathy. Trachea is midline. Glasses, hearing aids, [place]  CHEST: No tenderness or deformity, no crepitus  LUNG: dim but clear to bilateral bases to auscultation with good chest expansion. There are no crackles or wheezes, normal AP diameter. No cough noted, no shortness of breath, unlabored breathing  BACK: no lumbar pain, no radiation, no numb/tingling, moderate kyphosis  CVS: irregularly irregular rhythm, systolic murmur,  2+ pulses symmetric in all extremities.  ABDOMEN: Rounded and soft, nontender to palpation, non distended, no masses, no organomegaly, good bowel sounds, no rebound or guarding, no peritoneal signs.   EXTREMITIES: darin discoloration ble, trace ble edema  SKIN: Warm and dry,   NEUROLOGICAL: The patient is oriented to person, place. Forgetful, no numb/tingling ble.  Pleasantly confused            LABS:   Recent Results (from the past 168 hour(s))   INR   Result Value Ref Range    INR 2.79 (H) 0.90 - 1.10     Results for orders placed or performed in visit on 07/01/20   Basic Metabolic Panel   Result Value Ref Range    Sodium 138 136 - 145 mmol/L    Potassium 4.0 3.5 - 5.0 mmol/L    Chloride 103 98 - 107 mmol/L    CO2 26 22 - 31 mmol/L    Anion Gap, Calculation 9 5 - 18 mmol/L    Glucose 69 (L) 70 - 125 mg/dL    Calcium  9.6 8.5 - 10.5 mg/dL    BUN 35 (H) 8 - 28 mg/dL    Creatinine 1.43 (H) 0.70 - 1.30 mg/dL    GFR MDRD Af Amer 56 (L) >60 mL/min/1.73m2    GFR MDRD Non Af Amer 46 (L) >60 mL/min/1.73m2         Lab Results   Component Value Date    WBC 9.2 09/16/2020    HGB 11.2 (L) 09/16/2020    HCT 36.1 (L) 09/16/2020     (H) 09/16/2020     09/16/2020       Lab Results   Component Value Date    [id number] 796 10/02/2018     Lab Results   Component Value Date    HGBA1C 5.8 (H) 09/16/2020     Lab Results   Component Value Date    INR 2.79 (H) 01/06/2021    INR 1.75 (H) 12/30/2020    INR 2.35 (H) 12/02/2020     No results found for: [id number]  Lab Results   Component Value Date    TSH 4.46 11/25/2020           ASSESSMENT/PLAN:    COVID 19 infection: Denies symptoms today, appears well on exam. Per staff no symptoms or concerns. In isolation per [facility] guidelines. Will order zinc, vitamin c, vitamin d daily x 14 days. Will order proair prn x 14 days. Notify if develops further symptoms or concerns. No need for further anticoagulation for VTE risk as is on warfarin. Continue monitoring o2 sats and temps. Does not qualify for any other treatments at this time.   Chronic CHF, pulmonary hypertension: no ble edema, no shortness of breath recently, weekly [id number]. Echo showed diastolic dysfunction, mild pulmonary htn, EF 60-65%, severe LA and RA dilation. On lasix 20mg daily, kcl to 10meq daily. No recent concerns or signs of fluid overload. Trace edema today. Appears stable on current regimen.   Atrial fibrillation:. No chest pain or palpitations. On metoprolol, warfarin. Anticoagulation clinic manages warfarin. INR 1/6 2.79. stable.   HTN: SBP 130s. On metoprolol. No recent concerns.   S/p pacemaker: Follows with cardiology, [facility]. Last device check 11/18. Stable.   Cognitive impairment, vascular dementia:  on namenda, slow progressive decline. Appears stable today. No recent concerns.   H/o  prostate cancer: Radiation seeding in 2004. No recent follow ups, stable at this time.   Lumbar stenosis, chronic back pain: Tylenol prn, aspercreme prn. No recent complaints of pain. No recent concerns.   Macular degeneration, intraocular hemorrhage: Follows with Dr. Bill Figueroa. On atropine and prednisolone gtts. Stable at this time.   H/o CVA: On warfarin, no recent concerns.   Constipation: On colace prn, prune juice. senna daily prn. Denies concerns recently.    CKD stage 3: cr 1.24, GFR 50 on 6/24. Cr 1.43 on 7/1. Cr 1.32 gfr 51 on 9/16.     Electronically signed by: Delmi Regan NP

## 2021-06-14 NOTE — TELEPHONE ENCOUNTER
ANTICOAGULATION  MANAGEMENT    Assessment     Today's INR result of 2.79 is Therapeutic (goal INR of 2.0-3.0)        Previous INR was Subtherapeutic    Warfarin given as previously instructed    Acute health changes, covid positive, may be affecting INR    No new medication/supplements affecting INR    Continues to tolerate warfarin with no reported s/s of bleeding or thromboembolism       Plan:     Warfarin Dosing Orders:  Continue current warfarin dose 2.5 mg daily on wed/sat; and 1.25 mg daily rest of week  (0 % change)    Next INR: one week    Telephone orders given to nurseSeema.  Orders read back correctly.     Maxwell Florence RN    Subjective/Objective:      Raymond Zheng, a 89 y.o. male is on warfarin. Facility nurse reports for Raymond:    Other anticoagulants: No    Medication changes: No     Missed warfarin doses since last INR: No     Abnormal bleeding since last INR: No    New symptoms, injury or illness: No     Upcoming surgery, procedure or cardioversion: No    Recent INR Results:    Lab Results   Component Value Date    INR 2.79 (H) 01/06/2021    INR 1.75 (H) 12/30/2020    INR 2.35 (H) 12/02/2020       Anticoagulation Episode Summary     Current INR goal:  2.0-3.0   TTR:  71.6 % (1 y)   Next INR check:  1/13/2021   INR from last check:  2.79 (1/6/2021)   Weekly max warfarin dose:     Target end date:     INR check location:     Preferred lab:     Send INR reminders to:  [facility] (TCU/LTC/[facility])    Indications    A-fib (H) (Resolved) [I48.91]           Comments:           Anticoagulation Care Providers     Provider Role Specialty Phone number    Delmi Regan NP Responsible Nurse Practitioner [phone]

## 2021-06-14 NOTE — PROGRESS NOTES
Assessment:     Diagnoses and all orders for this visit:    Chronic bilateral low back pain without sciatica    Acute right-sided low back pain with right-sided sciatica       Raymond Zheng is a 89 y.o. y.o. male with past medical history significant for atrial fibrillation with cardiac pacemaker and on Coumadin therapy, prostate cancer, vitamin 12 deficiency, hypertension, CVA, knee osteoarthritis who presents today for follow-up regarding chronic low back pain that is tolerable, with new right low back pain with L3 and L4 radicular pain that is also improving with physical therapy.    Patient is neurologically intact on exam, does have positive straight leg raise on the right indicating radicular involvement.     Plan:     A shared decision making plan was used. The patient's values and choices were respected. Prior medical records from 11/1/17 were reviewed today. The following represents what was discussed and decided upon by the provider and the patient.        -DIAGNOSTIC TESTS: Images were personally reviewed and interpreted.   --Lumbar spine MRI reveals lumbar scoliosis with multiple degenerative changes including spondylolisthesis at L1-2, L2-3, L3-4, L5-S1. He has old osteoporotic compression fracture L4-5 and L5-S1.  Mild central canal stenosis at L4-5 and L2-3 as well as multilevel foraminal stenosis.    Foraminal stenosis most significant on the right L3-4 and L4-5 due to marked disc space narrowing from scoliosis.  Severe facet arthropathy at L3-4, L4-5, L5-S1, moderate at L2-3.    -INTERVENTIONS: Symptoms worsen down the road could consider a right L3-4 and L4-5 transfemoral epidural steroid injection, however as symptoms are tolerable we will hold off on any type of injection.    -MEDICATIONS: Advised patient continue gabapentin 100 mg tablet 3 times daily which he only takes 1 tablet daily at this time.  -Discussed side effects of medications and proper use. Patient verbalized  understanding.    -PHYSICAL THERAPY: Patient continue home exercise program, if symptoms worsen again would recommend further physical therapy sessions with [facility].  Referral was placed previously.  Discussed the importance of core strengthening, ROM, stretching exercises with the patient and how each of these entities is important in decreasing pain.  Explained to the patient that the purpose of physical therapy is to teach the patient a home exercise program.  These exercises need to be performed every day in order to decrease pain and prevent future occurrences of pain.        -PATIENT EDUCATION:  15 minutes of total visit time was spent face to face with the patient today, 60 % of the visit was spent on counseling, education, and coordinating care.   -5 minutes spent outside of visit time, non-face-to-face time, reviewing chart.    -FOLLOW UP: Follow-up as needed if symptoms are not improving.  Advised to contact clinic if symptoms worsen or change.    Subjective:     Raymond Zheng is a 89 y.o. male who presents today for follow-up regarding ongoing chronic bilateral low back pain that has improved with physical therapy after 10/10/2017 session, was having more significant right-sided low back pain with pain into the right lateral thigh and lateral shin however that is also improved, currently he reports his pain is a 5/10 however he reports is tolerable.    He denies numbness or tingling, denies weakness, denies recent trips or falls or bowel or bladder dysfunction.     Evaluation to Date: Severe facet arthropathy L3-4, L4-5, L5-S1. Spondylolisthesis multilevel as well as multilevel foraminal stenosis. Mild central canal stenosis L4-5 and L2-3.   Prior lumbar laminectomy at L3-4 and L4-5, as well as L2-3.      Treatment to Date: Lumbar surgery ×2 in 1980s.  Right shoulder bursa injection with Dr. Sipple 7/10/2015.  Currently not taking any medications for his back pain.  Physical therapy ×2  sessions [facility] 11/18/2016, patient reports still doing home exercise program daily.  --Physical therapy ×1 session 10/10/2017 for low back pain with relief.      Bilateral L3-4 and L4-5 facet joint steroid injection 10/19/2016 with 90% relief chronic low back pain.  Prescribed hydrocodone 11/29/2016 22 acute low back pain per Dr. Chase, however he reports it does not give him much relief.  Patient reports typically he does not like to take extra medications.  Bilateral L2, L3, L4 radiofrequency ablation 1/4/2017 with significant relief he reports that about 70% of his bilateral low back pain.  Relief ×9 months however.      **Patient is taking Coumadin daily.    Patient Active Problem List   Diagnosis     Prostate cancer - 2002 - [facility] - Treated with radiation brachytherapy. Followed by [facility].     Pacemaker     Atrial fibrillation     Vitamin B12 deficiency     Knee osteoarthritis     CVA (cerebral infarction)     HTN (hypertension)     Former smoker     DNR (do not resuscitate)       Current Outpatient Prescriptions on File Prior to Encounter   Medication Sig Dispense Refill     cyanocobalamin (VITAMIN B-12) 1000 MCG tablet Take 1,000 mcg by mouth daily.       diclofenac sodium (VOLTAREN) 1 % Gel Apply 1 application topically 3 (three) times a day. Each application should be 2-4 grams. 1 Tube 3     digoxin (LANOXIN) 125 mcg tablet TAKE ONE TABLET BY MOUTH DAILY 90 tablet 3     diltiazem (CARDIZEM CD) 180 MG 24 hr capsule TAKE ONE CAPSULE BY MOUTH ONE TIME DAILY 90 capsule 3     FOLIC ACID/MV,FE,MIN (CENTRUM ORAL) Take 1 tablet by mouth daily.        gabapentin (NEURONTIN) 100 MG capsule Take 100 mg by mouth 3 (three) times a day. 90 capsule 3     metoprolol succinate (TOPROL-XL) 25 MG Take 1 tablet (25 mg total) by mouth daily. 90 tablet 3     nitroglycerin (NITROSTAT) 0.4 MG SL tablet Place 0.4 mg under the tongue as needed for chest pain.       tamsulosin (FLOMAX) 0.4 mg Cp24 TAKE  1 CAPSULE BY MOUTH DAILY. 30 capsule 5     warfarin (COUMADIN) 2.5 MG tablet Take 1/2-1 tablet (1.25-2.5 mg) by mouth daily as directed. Adjust dose per INR results. 30 tablet 4     No current facility-administered medications on file prior to encounter.        Allergies   Allergen Reactions     Ciprofloxacin      Erythromycin Base      Oxycodone-Acetaminophen      Sulfa (Sulfonamide Antibiotics)        Past Medical History:   Diagnosis Date     Atrial fibrillation      CVA (cerebral infarction)      DNR (do not resuscitate) 10/18/2016     Former smoker      HTN (hypertension)      Knee osteoarthritis      Pacemaker      Prostate cancer     Treated with radiation brachytherapy. Followed by [facility].       Vitamin B12 deficiency      Vitiligo         Review of Systems  ROS: Positive for chronic balance change.  Specifically negative for bowel/bladder dysfunction, balance changes, headache, dizziness, foot drop, fevers, chills, appetite changes, nausea/vomiting, unexplained weight loss. Otherwise 13 systems reviewed are negative. Please see the patient's intake questionnaire from today for details.    Reviewed Social, Family, Past Medical and Past Surgical history with patient, no significant changes noted since prior visit.     Objective:     /79 (Patient Site: Left Arm, Patient Position: Sitting)  Pulse 71  Wt 161 lb (73 kg)  BMI 24.12 kg/m2    PHYSICAL EXAMINATION:    --CONSTITUTIONAL: Well developed, well nourished, healthy appearing individual.  --PSYCHIATRIC: Appropriate mood and affect. No difficulty interacting due to temper, social withdrawal, or memory issues.  --SKIN: Lumbar region is dry and intact. Sensation to light touch is intact in the bilateral L4, L5, and S1 dermatomes.  --RESPIRATORY: Normal rhythm and effort. No abnormal accessory muscle breathing patterns noted.   --MUSCULOSKELETAL:  Normal lumbar lordosis noted, no lateral shift.  --GROSS MOTOR: Easily arises from a seated  position.   --LUMBAR SPINE:  Inspection reveals no evidence of deformity. Range of motion is not limited in lumbar flexion, extension, or lateral rotation. No tenderness to palpation. Straight leg raising in the seated position is negative to radicular pain on the left and positive on the right. Sciatic notch non-tender.   --LOWER EXTREMITY MOTOR TESTING:  Plantar flexion left 5/5, right 5/5   Dorsiflexion left 5/5, right 5/5   Great toe MTP extension left 5/5, right 5/5  Knee flexion left 5/5, right 5/5  Knee extension left 5/5, right 5/5   Hip flexion left 5/5, right 5/5  Hip abduction left 5/5, right 5/5  Hip adduction left 5/5, right 5/5 .   --NEUROLOGIC: Bilateral patellar reflexes are physiologic and symmetric. Lower extremities are intact to light touch.     RESULTS:   Imaging: Lumbar spine imaging was reviewed today. The images were shown to the patient and the findings were explained using a spine model.      Ct Abdomen Pelvis Without Oral With Iv Contrast  Result Date: 11/30/2016  CT ABDOMEN PELVIS WO ORAL W IV CONTRAST 11/30/2016 4:27 PM INDICATION: abdominal pain and bloating TECHNIQUE: CT abdomen and pelvis. Multiplanar reformation images (MPR). Dose reduction techniques were used. IV CONTRAST: Iohexol (Omni) 75mL COMPARISON: None. FINDINGS: LUNG BASES: Lung bases are clear. There is mild cardiomegaly without cardiac pacemaker in place. ABDOMEN: There are multiple small stones dependently in the gallbladder lumen. No biliary dilatation. No common duct stones are identified. The spleen, adrenal glands are normal. There is pancreatic atrophy with fatty replacement of the pancreatic head. No abnormal pancreatic mass. There are bilateral renal cysts, left larger than right the largest left renal cyst measures 3.8 cm in diameter. There is mild prominence of the left extrarenal pelvis no left renal or ureteral stones or ureteral dilatation. No right renal stones or solid masses. There is no evidence for bowel  obstruction or free intraperitoneal air. Patient is status post anterior abdominal wall repair with multiple surgical clips. No recurrent hernia is seen. There is calcification of the abdominal aorta and renal artery origins end of the superior mesenteric artery origin. No aneurysm. No evidence for appendicitis. There is no abdominal lymphadenopathy. PELVIS: There are brachial therapy seeds in the prostate. No evidence for diverticulitis or abscess. No bowel containing hernias no pelvic lymphadenopathy. MUSCULOSKELETAL: There is lumbar scoliosis convex to the left with multilevel degenerative disc disease. There is mild compression of superior endplates of L4 and L5 which appears chronic no acute fractures are identified. Findings were called to Dr. Gamez at 1643 hours on 11/30/2016.   CONCLUSION:   1. No evidence for bowel obstruction, appendicitis, diverticulitis or abscess.   2. Bilateral renal cysts.   3. Arterial calcification without aneurysm.   4. Cholelithiasis.   5. The cause of the patient's symptoms is not evident on this examination.        XR LUMBAR SPINE FLEX AND EXT 2 OR 3 VWS  10/17/2016   INDICATION: Spondylolisthesis, evaluate stability.COMPARISON: CT 10/11/2016.  FINDINGS: Flexion-extension views shows mild retrolisthesis of L1 on L2, L2 on L3 and L3 on L4. This is in extension.   These slightly reduce to normal alignment at the L1-2 and L2-3 levels with flexion. The L3-L4 level does not change in flexion. Flexion-extension views otherwise unremarkable. Stable chronic compression fracture inferior endplate of L1.        Ct Lumbar Spine Without Contrast  Result Date: 10/11/2016  [facility] CT LUMBAR SPINE WO CONTRAST 10/11/2016, 12:03 PM INDICATION: Low back pain. TECHNIQUE: Helical thin-section CT scan of the spine was performed using bone reconstruction algorithm. From the axial source data, sagittal and coronal thin reformats. Dose reduction techniques were used. IV CONTRAST: None.  COMPARISON: None. FINDINGS: Five lumbar-type vertebral bodies. Mild thoracolumbar kyphosis. Slightly exaggerated lumbar lordosis. Degenerative left convex scoliosis measuring 28 degrees between L2 and L5. No fracture or destructive bony change. Paraspinous muscle volume is normal. Moderate aortic calcification, normal caliber. No visible adenopathy. Moderate degenerative change SI joints.   T12-L1: Mild disc height loss. Ventral interbody spurring. Moderate facet arthropathy. No central canal or foraminal stenosis.   L1-L2: 3-mm retrolisthesis. 4-mm right subluxation. Moderate to marked disc height loss. Gas in the disc space. Moderate circumferential disc bulge and interbody spur. Moderate facet arthropathy. Facet diastases. No central canal stenosis. Mild to moderate right foraminal narrowing. Moderate left foraminal stenosis from retrolisthesis.   L2-L3: 3-mm retrolisthesis. 5-mm right subluxation. Mild disc height loss. Gas in the disc space. Mild to moderate circumferential interbody spur and disc bulge, asymmetric to the right. Previous wide laminectomy. Moderately advanced facet arthropathy. Mild central canal stenosis at the superior aspect of the disc space. Moderate bilateral foraminal stenoses.   L3-L4: 4-mm retrolisthesis. Marked right-sided disc height loss. Old superior endplate osteoporotic impaction. Severe facet arthropathy. Wide laminectomy. No central canal stenosis. Moderately severe bilateral foraminal stenoses.   L4-L5: Osteoporotic superior endplate impaction, which is old. Ballooning of the disc. Moderate to marked right-sided disc space narrowing with some gas in the disc space. Marked right-sided osteophytic spurring. Moderate ventral spurring. Severe facet arthropathy with facet diastases. Wide laminectomy. Mild central canal stenosis. Moderate right foraminal stenosis. Mild to moderate left foraminal narrowing.   L5-S1: 3-mm anterolisthesis. 3-mm left subluxation. Moderate disc height  loss with gas in the disc space. Circumferential interbody spurring, which is more prominent on the left. Severe facet arthropathy. Partial laminectomy. No central canal stenosis. Mild right foraminal narrowing. Moderately severe left foraminal stenosis.   CONCLUSION:   1. Old osteoporotic endplate depressions of superior L4 and L5 endplates. No definite acute fracture.   2. Advanced degenerative changes throughout the lumbar spine. Previous multilevel laminectomy.   3. Mild central canal stenosis at L4-L5 and L2-L3.   4. Multilevel foraminal stenoses, as described.

## 2021-06-14 NOTE — PROGRESS NOTES
F/U from 11/1/17  --C/O midline low back pain, radiates down the right buttock, right posterior leg to the calf, no change  --Rates pain 5/10  --PT x 1 session [facility], 10/10/17   --No new PT but doing stretches that Isaura gave him last time per patient    Medication  --Gabapentin 100 mg PRN only per pt  --Diclofenac gel 1% PRN

## 2021-06-14 NOTE — PROGRESS NOTES
ASSESSMENT: Onychauxis, callus, hammertoes, foot pain    PLAN: Callus debrided x1 and toenails were debrided manually and mechanically ×10.  Return to clinic in 9 weeks.      SUBJECTIVE: He returns to the [facility] regarding calluses and thick painful toenails.  He has had ulcers under the calluses in the past.  He has advanced hammertoe deformities which have led to friction between the toes and distal irritation with weightbearing. He has not seen bleeding or drainage.  He denies fever or chills.  He is not diabetic.  His last visit with me was October 16, 2017.    PHYSICAL EXAM:  General: Pleasant 89 y.o. male in no acute distress.  Vascular: DP pulses are diminished. PT pulses are absent. Pedal hair is absent. Feet are warm to the touch.  Cardiac: Pulse is regular.  Lymphatic: No edema at the ankles.  Neuro: Sensation in the feet is grossly intact to light touch.  Derm: Hyperkeratosis at the end of the right fourth toe. That was debrided down to intact skin.  The toenails are elongated, thickened and dystrophic with discoloration and subungual debris.  Musculoskeletal: Hammertoes bilaterally with pressure between the first and second toes.

## 2021-06-14 NOTE — PROGRESS NOTES
"[facility] Clinic Note  Patient Name: Raymond Zheng  Patient Age: 89 y.o.  YOB: 1925  MRN: [id number]  ?  Date of Visit: 11/14/2017  Reason for Office Visit:   Chief Complaint   Patient presents with     Cough     Cough started 2 weeks ago with laryngitis. Cough got better this weekend but then started again. Not coughing up anything, and can hear chest congestion. Some sob. No fever. Eating well.        HPI: Raymond Zheng 89 y.o. male who presents to clinic for cough x 2 weeks, started with sore throat, laryngitis, horse voice and then progressed into a 'full blown' cough over past 2 days. Last night was bad. Raspy cough but cannot seem to get anything up. No fevers/chills. Appetite good. \"I feel good otherwise\". No sob, bacon, chest pain, pleurisy. He has drainage and post nasal drip.     Review of Systems: As noted in HPI     Current Scheduled Meds:  Outpatient Encounter Prescriptions as of 11/14/2017   Medication Sig Dispense Refill     cyanocobalamin (VITAMIN B-12) 1000 MCG tablet Take 1,000 mcg by mouth daily.       diclofenac sodium (VOLTAREN) 1 % Gel Apply 1 application topically 3 (three) times a day. Each application should be 2-4 grams. 1 Tube 3     digoxin (LANOXIN) 125 mcg tablet TAKE ONE TABLET BY MOUTH DAILY 90 tablet 3     diltiazem (CARDIZEM CD) 180 MG 24 hr capsule TAKE ONE CAPSULE BY MOUTH ONE TIME DAILY 90 capsule 3     FOLIC ACID/MV,FE,MIN (CENTRUM ORAL) Take 1 tablet by mouth daily.        gabapentin (NEURONTIN) 100 MG capsule Take 100 mg by mouth 3 (three) times a day. 90 capsule 3     metoprolol succinate (TOPROL-XL) 25 MG Take 1 tablet (25 mg total) by mouth daily. 90 tablet 3     nitroglycerin (NITROSTAT) 0.4 MG SL tablet Place 0.4 mg under the tongue as needed for chest pain.       tamsulosin (FLOMAX) 0.4 mg Cp24 TAKE 1 CAPSULE BY MOUTH DAILY. 30 capsule 5     warfarin (COUMADIN) 2.5 MG tablet Take 1/2-1 tablet (1.25-2.5 mg) by mouth daily as directed. Adjust dose per " "INR results. 30 tablet 4     No facility-administered encounter medications on file as of 11/14/2017.        Objective / Physical Examination:  /70  Pulse 74  Resp 16  Ht 5' 8.5\" (1.74 m)  Wt 162 lb (73.5 kg)  SpO2 99%  BMI 24.27 kg/m2  Wt Readings from Last 3 Encounters:   11/14/17 162 lb (73.5 kg)   11/10/17 159 lb (72.1 kg)   11/01/17 156 lb (70.8 kg)     Body mass index is 24.27 kg/(m^2). (>25?)    General Appearance: Alert and oriented in no acute distress  Head: sinuses NT  Ears: Tympanic membrane clear with landmarks well visualized bilaterally  Eyes: Conjunctivae clear  Nose: Septum midline, no visible polyps, thick white nasal drainage  Throat: Lips and mucosa moist. pharynx without erythema or exudate  Neck: Supple, trachea midline. No cervical adenopathy.   Lungs: Clear to auscultation bilaterally. Normal inspiratory and expiratory effort. No w/r/r  Cardiovascular: RRR   Integumentary: Warm and dry  Neuro: Alert and oriented, follows commands appropriately    Assessment / Plan / Medical Decision Making:      Encounter Diagnoses   Name Primary?     Cough Yes        1. Cough  Lungs CTA. Hemodynamically stable  Suspect this is more post nasal cough   Recommended flonase 1 spray BID for the next week  Sinus rinses, fluids, supportive cares    If not improving or symptoms worsen return for reevaluation     Total time spent with patient was 15 minutes with >50% of time spent in face-to-face counseling regarding the above plan     Israel Adams MD  [facility]   "

## 2021-06-14 NOTE — TELEPHONE ENCOUNTER
ANTICOAGULATION  MANAGEMENT PROGRAM    Raymond Zheng is being discharged from the [facility] Anticoagulation Management Program (AC).    Reason for discharge: , per nurse Bety    ACM referral closed, anticoagulation episode resolved and INR standing order discontinued.       Maxwell Florence RN

## 2021-06-15 NOTE — PROGRESS NOTES
[facility] Clinic Note  Patient Name: Raymond Zheng  Patient Age: 89 y.o.  YOB: 1925  MRN: [id number]  ?  Date of Visit: 1/15/2018  Reason for Office Visit:   Chief Complaint   Patient presents with     Cough     x1 week dry cough. not coughing up mucus, no fever. little bit of chest congestion       HPI: Raymond Zheng 89 y.o. who presents to clinic for cough x 1 week, dry cough, no fever/chills. Night and day coughing. No chest pain, sob, bacon, congestion. No sinus or ear pain. He uses cough drops.     Review of Systems: As noted in HPI     Current Scheduled Meds:  Outpatient Encounter Prescriptions as of 1/15/2018   Medication Sig Dispense Refill     cyanocobalamin (VITAMIN B-12) 1000 MCG tablet Take 1,000 mcg by mouth daily.       diclofenac sodium (VOLTAREN) 1 % Gel Apply 1 application topically 3 (three) times a day. Each application should be 2-4 grams. 1 Tube 3     digoxin (LANOXIN) 125 mcg tablet TAKE ONE TABLET BY MOUTH DAILY 90 tablet 3     diltiazem (CARDIZEM CD) 180 MG 24 hr capsule TAKE ONE CAPSULE BY MOUTH ONE TIME DAILY 90 capsule 3     FOLIC ACID/MV,FE,MIN (CENTRUM ORAL) Take 1 tablet by mouth daily.        gabapentin (NEURONTIN) 100 MG capsule Take 100 mg by mouth 3 (three) times a day. 90 capsule 3     metoprolol succinate (TOPROL-XL) 25 MG Take 1 tablet (25 mg total) by mouth daily. 90 tablet 3     nitroglycerin (NITROSTAT) 0.4 MG SL tablet Place 0.4 mg under the tongue as needed for chest pain.       tamsulosin (FLOMAX) 0.4 mg Cp24 TAKE 1 CAPSULE BY MOUTH DAILY. 30 capsule 5     warfarin (COUMADIN) 2.5 MG tablet Take 1/2-1 tablet (1.25-2.5 mg) by mouth daily as directed. Adjust dose per INR results. 30 tablet 4     benzonatate (TESSALON PERLES) 100 MG capsule Take 1 capsule (100 mg total) by mouth every 6 (six) hours as needed for cough. 15 capsule 0     No facility-administered encounter medications on file as of 1/15/2018.        Objective / Physical Examination:  /80  (Patient Site: Right Arm, Patient Position: Sitting, Cuff Size: Adult Regular)  Pulse (!) 56  Temp 97.6  F (36.4  C) (Oral)   Wt 165 lb 4.8 oz (75 kg)  SpO2 97%  BMI 24.77 kg/m2  Wt Readings from Last 3 Encounters:   01/15/18 165 lb 4.8 oz (75 kg)   12/18/17 161 lb (73 kg)   11/15/17 161 lb (73 kg)     Body mass index is 24.77 kg/(m^2). (>25?)    General Appearance: Alert and oriented in no acute distress  Ears: Tympanic membrane clear with landmarks well visualized bilaterally  Eyes: Conjunctivae clear   Nose: mucosa moist and without drainage  Throat: pharynx without erythema or exudate  Neck: Supple, trachea midline. No Thyromegaly   Lungs: Clear to auscultation bilaterally. Normal inspiratory and expiratory effort. No w/r/r  Cardiovascular: RRR  Integumentary: Warm and dry  Neuro: Gait normal     Assessment / Plan / Medical Decision Making:      Encounter Diagnoses   Name Primary?     Cough Yes        1. Cough    Dry cough x 1 week. Lungs CTA. Vitals stable. Likely viral, recommend supportive cares. Will send script for tessalon to try and reduce cough. If worsening or not improving return for reevaluation    - benzonatate (TESSALON PERLES) 100 MG capsule; Take 1 capsule (100 mg total) by mouth every 6 (six) hours as needed for cough.  Dispense: 15 capsule; Refill: 0      No Follow-up on file. earlier if needed.    Total time spent with patient was 15 minutes with >50% of time spent in face-to-face counseling regarding the above plan     Israel Adams MD  [facility]

## 2021-06-15 NOTE — PROGRESS NOTES
[facility] Discharge Summary  Patient Name: Raymond Cortez  Date: 1/23/2018  Referral Diagnosis: Chronic LBP  Referring provider: Isaura Pugh C*  Visit Diagnosis:   1. Chronic bilateral low back pain without sciatica     2. Poor posture         Goals:  See below    Patient was seen for 1 visit on 10/10/17.  The patient met goals and has demonstrated understanding of and independence in the home program for self-care, and progression to next steps.  He will initiate contact if questions or concerns arise.    Therapy will be discontinued at this time.  The patient will need a new referral to resume.    Thank you for your referral.  Ana Brand  1/23/2018  7:40 AM

## 2021-06-15 NOTE — PROGRESS NOTES
[employer] Medicare Disenrollment    Member was disenrolled from [employer] effective: 1-11-21  Reason for disenrollment: death

## 2021-06-16 PROBLEM — H35.30 MACULAR DEGENERATION: Status: ACTIVE | Noted: 2019-03-10

## 2021-06-16 PROBLEM — R41.3 MEMORY DIFFICULTIES: Status: ACTIVE | Noted: 2018-12-11

## 2021-06-16 PROBLEM — M54.50 LOW BACK PAIN: Status: ACTIVE | Noted: 2018-05-22

## 2021-06-16 PROBLEM — M48.00 MULTILEVEL FORAMINAL STENOSIS: Status: ACTIVE | Noted: 2018-05-22

## 2021-06-16 PROBLEM — Z95.0 CARDIAC PACEMAKER IN SITU: Status: ACTIVE | Noted: 2020-01-01

## 2021-06-16 PROBLEM — F34.1 DYSTHYMIA: Status: ACTIVE | Noted: 2020-01-01

## 2021-06-16 PROBLEM — E03.8 OTHER SPECIFIED HYPOTHYROIDISM: Status: ACTIVE | Noted: 2020-01-01

## 2021-06-16 PROBLEM — R60.9 DEPENDENT EDEMA: Status: ACTIVE | Noted: 2019-03-10

## 2021-06-16 PROBLEM — F01.50 VASCULAR DEMENTIA (H): Status: ACTIVE | Noted: 2019-09-07

## 2021-06-16 PROBLEM — N18.30 CHRONIC KIDNEY DISEASE, STAGE 3 (H): Status: ACTIVE | Noted: 2020-01-01

## 2021-06-16 PROBLEM — I50.32 CHRONIC DIASTOLIC CONGESTIVE HEART FAILURE (H): Status: ACTIVE | Noted: 2020-01-01

## 2021-06-16 PROBLEM — I27.20 MILD PULMONARY HYPERTENSION (H): Status: ACTIVE | Noted: 2018-12-11

## 2021-06-16 PROBLEM — K59.00 CONSTIPATION: Status: ACTIVE | Noted: 2019-03-10

## 2021-06-16 PROBLEM — U07.1 2019 NOVEL CORONAVIRUS DISEASE (COVID-19): Status: ACTIVE | Noted: 2021-01-01

## 2021-06-16 PROBLEM — R41.89 COGNITIVE DECLINE: Status: ACTIVE | Noted: 2020-01-01

## 2021-06-16 NOTE — TELEPHONE ENCOUNTER
Telephone Encounter by Boo Quesada RN at 3/1/2019  3:27 PM     Author: Boo Quesada RN Service: -- Author Type: RN, Care Manager    Filed: 3/1/2019  3:29 PM Encounter Date: 3/1/2019 Status: Attested    : Boo Quesada RN (RN, Care Manager) Cosigner: Janes Marie MD at 3/1/2019  3:30 PM    Attestation signed by Janes Marie MD at 3/1/2019  3:30 PM    Anticoagulation care reviewed.  I agree with the plan of care.    Janes Marie MD  [facility]  3/1/2019, 3:30 PM                   ANTICOAGULATION  MANAGEMENT- Home Care/Care Facility Result    Assessment     Today's INR result of 2.46 is Therapeutic (goal INR of 2.0-3.0)        Warfarin taken differently than instructed, but no impact to total weekly dose    No new diet changes affecting INR    No new medication/supplements affecting INR    Continues to tolerate warfarin with no reported s/s of bleeding or thromboembolism     Previous INR was Therapeutic    Plan:     Spoke with Mariel ALEJANDRE nurse Tomy discussed INR result and instructed:     Warfarin Dosing Instructions: Continue current warfarin dose    1.25 mg every Sun, Wed; 2.5 mg all other days         Next INR to be drawn: 4 weeks.     Education provided: importance of taking warfarin as instructed    Tomy verbalizes understanding and agrees to warfarin dosing plan.   ?   Boo Quesada RN    Subjective/Objective:      Raymond Zheng, a 89 y.o. male is established on warfarin.     Home care/care facility RN's report of Raymond INR, recent warfarin dosing, diet changes, medication changes, and symptoms is documented below.    Additional findings: verbally confirmed home dose with Tomy and updated on anticoagulation calendar    Anticoagulation Episode Summary     Current INR goal:   2.0-3.0   TTR:   86.9 % (4.2 y)   Next INR check:   3/29/2019   INR from last check:   2.46 (3/1/2019)   Weekly max warfarin dose:      Target end date:      INR  check location:      Preferred lab:      Send INR reminders to:   ANTICOAGULATION POOL B (MPW,HUG,STW,RVL,OAK,RLN)    Indications    A-fib (H) (Resolved) [I48.91]           Comments:            Anticoagulation Care Providers     Provider Role Specialty Phone number    Ciro Meyer MD Referring Family Medicine [phone]

## 2021-06-16 NOTE — TELEPHONE ENCOUNTER
Telephone Encounter by Vanita Ashton RN at 1/9/2019  1:34 PM     Author: Vanita Ashton RN Service: -- Author Type: Registered Nurse    Filed: 1/9/2019  1:39 PM Encounter Date: 1/9/2019 Status: Attested    : Vanita Ashton RN (Registered Nurse) Cosigner: Janes Marie MD at 1/9/2019  3:25 PM    Attestation signed by Janes Marie MD at 1/9/2019  3:25 PM    Anticoagulation care reviewed.  I agree with the plan of care.    Janes Marie MD  [facility]  1/9/2019, 3:25 PM                   ANTICOAGULATION  MANAGEMENT    Assessment     Today's INR result of 2.3 is Therapeutic (goal INR of 2.0-3.0)        Warfarin taken as previously instructed    No new diet changes affecting INR    No new medication/supplements affecting INR    Continues to tolerate warfarin with no reported s/s of bleeding or thromboembolism     Previous INR was Therapeutic    Plan:     Spoke with Sydni, spouse, regarding INR result and instructed:     Warfarin Dosing Instructions:  Continue current warfarin dose 1.25 mg daily on Sun, Thu; and 2.5 mg daily rest of week  (0 % change)    Instructed patient to follow up no later than: 4 weeks (will check at 2/1 OV)    Education provided: target INR goal and significance of current INR result and importance of notifying clinic for changes in medications    Sydni verbalizes understanding and agrees to warfarin dosing plan.    Instructed to call the [facility] for any changes, questions or concerns. (#[phone])   ?   Vanita Ashton RN    Subjective/Objective:      Raymond Zheng, a 89 y.o. male is on warfarin.     Raymond reports:     Home warfarin dose: template incorrect; verbally confirmed home dose with Sydni and updated on anticoagulation calendar     Missed doses: No     Medication changes:  No     S/S of bleeding or thromboembolism:  No     New Injury or illness:  No     Changes in diet or alcohol consumption:  No     Upcoming surgery, procedure or  cardioversion:  No    Anticoagulation Episode Summary     Current INR goal:   2.0-3.0   TTR:   86.5 % (4 y)   Next INR check:   2/6/2019   INR from last check:   2.30 (1/9/2019)   Weekly max warfarin dose:      Target end date:      INR check location:      Preferred lab:      Send INR reminders to:   ANTICOAGULATION POOL B (MPW,HUG,STW,RVL,OAK,RLN)    Indications    A-fib (H) (Resolved) [I48.91]           Comments:            Anticoagulation Care Providers     Provider Role Specialty Phone number    Ciro Meyer MD Referring Family Medicine [phone]

## 2021-06-16 NOTE — TELEPHONE ENCOUNTER
Telephone Encounter by Vanita Ashton RN at 2/1/2019 12:01 PM     Author: Vanita Ashton RN Service: -- Author Type: Registered Nurse    Filed: 2/1/2019 12:09 PM Encounter Date: 2/1/2019 Status: Attested    : Vanita Ashton RN (Registered Nurse) Cosigner: Janes Marie MD at 2/1/2019 12:15 PM    Attestation signed by Janes Marie MD at 2/1/2019 12:15 PM    Anticoagulation care reviewed.  I agree with the plan of care.    Janes Marie MD  [facility]  2/1/2019, 12:15 PM                    ANTICOAGULATION  MANAGEMENT    Assessment     Today's INR result of 2.7 is Therapeutic (goal INR of 2.0-3.0)        Warfarin taken as previously instructed    No new diet changes affecting INR    No new medication/supplements affecting INR    Continues to tolerate warfarin with no reported s/s of bleeding or thromboembolism     Previous INR was Therapeutic    Plan:     Met with Lavell in clinic regarding INR result and instructed:     Warfarin Dosing Instructions:  Continue current warfarin dose 1.25 mg daily on Sun, Thu; and 2.5 mg daily rest of week  (0 % change)    Instructed patient to follow up no later than: 4 weeks    Education provided: target INR goal and significance of current INR result, importance of following up for INR monitoring at instructed interval, importance of taking warfarin as instructed and written instructions provided    Rafa verbalizes understanding and agrees to warfarin dosing plan.    Instructed to call the [facility] for any changes, questions or concerns. (#[phone])   ?   Vanita Ashton RN    Subjective/Objective:      Raymond Zheng, a 89 y.o. male is on warfarin.     Raymond reports:     Home warfarin dose: verbally confirmed home dose with Sydni and updated on anticoagulation calendar     Missed doses: No     Medication changes:  No     S/S of bleeding or thromboembolism:  No     New Injury or illness:  No     Changes in diet or  alcohol consumption:  No     Upcoming surgery, procedure or cardioversion:  No    Anticoagulation Episode Summary     Current INR goal:   2.0-3.0   TTR:   86.7 % (4.1 y)   Next INR check:   3/1/2019   INR from last check:   2.70 (2/1/2019)   Weekly max warfarin dose:      Target end date:      INR check location:      Preferred lab:      Send INR reminders to:   ANTICOAGULATION POOL B (MPW,HUG,STW,RVL,OAK,RLN)    Indications    A-fib (H) (Resolved) [I48.91]           Comments:            Anticoagulation Care Providers     Provider Role Specialty Phone number    Ciro Meyer MD Referring Family Medicine [phone]

## 2021-06-16 NOTE — PROGRESS NOTES
ASSESSMENT: Onychauxis, hammertoes, foot pain    PLAN: Toenails were debrided manually and mechanically ×10.  He will place a cotton ball between the left first and second toes to minimize pressure there.  Return to clinic in 9 weeks.      SUBJECTIVE: He returns to the [facility] clinic regarding calluses and thick painful toenails.  He has had ulcers under the calluses in the past.  He has advanced hammertoe deformities which have led to friction between the toes and distal irritation with weightbearing. He has not seen bleeding or drainage.  He denies fever or chills.  He is not diabetic.  His last visit with me was December 18, 2017.    PHYSICAL EXAM:  General: Pleasant 89 y.o. male in no acute distress.  Vascular: DP pulses are diminished. PT pulses are absent. Pedal hair is absent. Feet are warm to the touch.  Cardiac: Pulse is regular.  Lymphatic: No edema at the ankles.  Neuro: Sensation in the feet is grossly intact to light touch.  Derm: The toenails are elongated, thickened and dystrophic with discoloration and subungual debris.  Calluses scattered around the toes.  Musculoskeletal: Hammertoes bilaterally with pressure between the first and second toes.

## 2021-06-16 NOTE — TELEPHONE ENCOUNTER
Telephone Encounter by Kaitlin Fountain, Nela at 2/27/2019  3:55 PM     Author: Kaitlin Fountain, PharmJE Service: -- Author Type: Pharmacist    Filed: 2/27/2019  3:59 PM Encounter Date: 2/27/2019 Status: Attested    : Kaitlin Fountain PharmD (Pharmacist) Cosigner: Janes Marie MD at 2/27/2019 10:00 PM    Attestation signed by Janes Marie MD at 2/27/2019 10:00 PM    Anticoagulation care reviewed.  I agree with the plan of care.    Janes Marie MD  [facility]  2/27/2019, 10:00 PM                    Anticoagulation    Contacted by Jeannette at [facility].     Raymond and his wife recently moved into assisted living. They are requesting to do INR draws with [facility] in facility. Receiving medication set-up services. Maybe establishing care with medical care for seniors in facility as well.      Chart reviewed. Raymond due for INR on 3/1.  Gave Jeannette order for 3/1 INR check.    Kaitlin Fountain, PharmD

## 2021-06-17 NOTE — TELEPHONE ENCOUNTER
Telephone Encounter by Maxwell Florence RN at 12/30/2020 11:50 AM     Author: Maxwell Florence RN Service: -- Author Type: Registered Nurse    Filed: 12/30/2020  3:44 PM Encounter Date: 12/30/2020 Status: Signed    : Maxwell Florence RN (Registered Nurse)       ANTICOAGULATION  MANAGEMENT    Assessment     Today's INR result of 1.75 is Subtherapeutic (goal INR of 2.0-3.0)        Previous INR was Therapeutic    Warfarin given as previously instructed    No new health/diet changes affecting INR    No new medication/supplements affecting INR    Continues to tolerate warfarin with no reported s/s of bleeding or thromboembolism       Plan:     Warfarin Dosing Orders:  Change warfarin dose to 2.5 mg daily on wed/sat; and 1.25 mg daily rest of week  (12.5 % change)    Next INR: one week 1/6    Telephone orders given to nurseSeema.  Orders read back correctly.     Maxwell Florence RN    Subjective/Objective:      Raymond Zheng, a 89 y.o. male is on warfarin. Facility nurse reports for Raymond:    Other anticoagulants: No    Medication changes: No     Missed warfarin doses since last INR: No     Abnormal bleeding since last INR: No    New symptoms, injury or illness: No     Upcoming surgery, procedure or cardioversion: No    Recent INR Results:    Lab Results   Component Value Date    INR 1.75 (H) 12/30/2020    INR 2.35 (H) 12/02/2020    INR 2.57 (H) 11/04/2020       Anticoagulation Episode Summary     Current INR goal:  2.0-3.0   TTR:  72.1 % (1 y)   Next INR check:  1/6/2021   INR from last check:  1.75 (12/30/2020)   Weekly max warfarin dose:     Target end date:     INR check location:     Preferred lab:     Send INR reminders to:  [facility] (TCU/LTC/skilled nursing)    Indications    A-fib (H) (Resolved) [I48.91]           Comments:           Anticoagulation Care Providers     Provider Role Specialty Phone number    Delmi Regan NP Responsible Nurse Practitioner [phone]

## 2021-06-17 NOTE — PROGRESS NOTES
ANTICOAGULATION  MANAGEMENT    Reviewed records from recent Emergency Room Visit for hypertension.    No adjustment to anticoagulation plan needed    Vanita Ashton RN

## 2021-06-17 NOTE — PROGRESS NOTES
In clinic device check with Adis Wang RN CNP.  Please see link for full device report.  Patient was informed of results and next follow up during today's visit.

## 2021-06-17 NOTE — PROGRESS NOTES
Updating med list.  B12 is only supposed to be 500mcg daily per patient.  I find no documentation of this in chart.  Will defer to PCP.  -clyde

## 2021-06-17 NOTE — PROGRESS NOTES
Assessment/Plan:     1.  Permanent atrial fibrillation: Asymptomatic.  His device check today showed few occasional episodes of rapid ventricular response and one episode of 4 beats NSVT.  Patient denies any symptoms associated with this episodes.  The overall increased heart rate is about 5-10% for heart rate greater than 120.His NOX1IW-Niin score is 5 and he is on warfarin.  He denies having any bleeding issues.  He is on digoxin 125 MCG daily, metoprolol succinate 25 mg, and diltiazem 180 mg daily.    His occasional increase in heart rate could be related to the days he goes financing.  Increase metoprolol succinate to 50 mg daily.  Checking discharge level today.  encouraged to call if experiencing lightheaded or dizziness with excessive fatigue and low BP.    2. Hypertension: 165/80 and recheck 148/80.  His blood pressure was elevated during last heart care clinic visit in October 2017.  Increased metoprolol succinate to 50 mg daily.  Recommended to check blood pressure ×1 week and call with readings.-Agreed with plan.  We also discussed about limiting salt intake to less than 2400 mg per day and regular physical exercise as tolerated.    Follow-up with Dr. Corbett in 6 months or sooner if needed.  Follow-up with  as neededime   Subjective:     Raymond Zheng is a 89 y.o. years old  with a significant PMH of permanent atrial fibrillation, hypertension, CVA, status post pacemaker, and former smoker who is seen at [facility] for 6 months follow up per Dr. Corbett.  His device check today showed few episodes of rapid ventricular response heart rate in 130s-180s with one episode of 4 beats NSVT but patient denies any symptoms associated with this episodes.    He reports doing well since he was last seen by Dr. Corbett in October 2017.  He continues to go for dancing twice a week and has been tolerating the activity without having any symptoms.  He denies fatigue, lightheadedness, shortness of breath, dyspnea on  exertion, orthopnea, PND, palpitations, chest pain, abdominal fullness/bloating and lower extremity edema.  He is on chronic anticoagulant and denies having any bleeding issues.    Review of Systems:   General: WNL  Eyes: WNL  Ears/Nose/Throat: WNL  Lungs: WNL  Heart: WNL  Stomach: WNL  Bladder: WNL  Muscle/Joints: WNL  Skin: WNL  Nervous System: WNL  Mental Health: WNL     Blood: WNL     Patient Active Problem List   Diagnosis     Prostate cancer - 2002 - [facility] - Treated with radiation brachytherapy. Followed by [facility].     Pacemaker     Atrial fibrillation     Vitamin B12 deficiency     Knee osteoarthritis     CVA (cerebral infarction)     HTN (hypertension)     Former smoker     DNR (do not resuscitate)     Anticoagulation goal of INR 2 to 3       Past Medical History:   Diagnosis Date     Anticoagulation goal of INR 2 to 3      Atrial fibrillation      CVA (cerebral infarction)      DNR (do not resuscitate) 10/18/2016     Former smoker      HTN (hypertension)      Knee osteoarthritis      Onychomycosis      Pacemaker      Prostate cancer     Treated with radiation brachytherapy. Followed by [facility].       Vitamin B12 deficiency      Vitiligo        Past Surgical History:   Procedure Laterality Date     APPENDECTOMY            HERNIA REPAIR            LAMINECTOMY            PARTIAL GASTRECTOMY              Family History   Problem Relation Age of Onset     Cancer Mother      Kidney disease Father      Heart disease Brother      Lung cancer Brother      Stroke Sister      Heart disease Sister        Social History     Social History     Marital status:      Spouse name: Sydni     Number of children: 0     Years of education: N/A     Occupational History      Retired     Social History Main Topics     Smoking status: Former Smoker     Smokeless tobacco: Never Used     Alcohol use 1.5 oz/week     3 Standard drinks or equivalent per week     Drug use: Not on file     Sexual activity:  Not on file     Other Topics Concern     Not on file     Social History Narrative    .  Lives in home with wife.        Former .       Current Outpatient Prescriptions   Medication Sig Dispense Refill     cyanocobalamin (VITAMIN B-12) 1000 MCG tablet Take 1,000 mcg by mouth daily.       diclofenac sodium (VOLTAREN) 1 % Gel Apply 1 application topically 3 (three) times a day. Each application should be 2-4 grams. 1 Tube 3     digoxin (LANOXIN) 125 mcg tablet TAKE ONE TABLET BY MOUTH DAILY 90 tablet 3     diltiazem (CARDIZEM CD) 180 MG 24 hr capsule TAKE ONE CAPSULE BY MOUTH ONE TIME DAILY 90 capsule 3     FOLIC ACID/MV,FE,MIN (CENTRUM ORAL) Take 1 tablet by mouth daily.        gabapentin (NEURONTIN) 100 MG capsule Take 100 mg by mouth 3 (three) times a day. 90 capsule 3     nitroglycerin (NITROSTAT) 0.4 MG SL tablet Place 0.4 mg under the tongue as needed for chest pain.       tamsulosin (FLOMAX) 0.4 mg Cp24 TAKE 1 CAPSULE BY MOUTH DAILY. 90 capsule 1     warfarin (COUMADIN) 2.5 MG tablet Take 0.5-1 tablets (1.25-2.5 mg total) by mouth daily. Adjust dose per INR results as directed. 75 tablet 1     benzonatate (TESSALON PERLES) 100 MG capsule Take 1 capsule (100 mg total) by mouth every 6 (six) hours as needed for cough. 15 capsule 0     metoprolol succinate (TOPROL XL) 50 MG 24 hr tablet Take 1 tablet (50 mg total) by mouth daily. 90 tablet 3     No current facility-administered medications for this visit.        Allergies   Allergen Reactions     Ciprofloxacin      Erythromycin Base      Oxycodone-Acetaminophen      Sulfa (Sulfonamide Antibiotics)        Objective:     Vitals:    04/25/18 1530   BP: 148/80   Pulse:    Resp:      Body mass index is 24.72 kg/(m^2).    General Appearance:   Alert, cooperative and in no acute distress.   HEENT:  No scleral icterus; the mucous membranes were pink and moist. No JVD and negative HJR.   Chest: The spine was straight. The chest was symmetric.   Lungs:    Respirations unlabored; the lungs are clear to auscultation.   Cardiovascular:   S1 and S2 without murmur, clicks or rubs. Carotid pulses are intact and symmetrical.  No carotid/radial/pedal pulses are intact. CMS intact. No carotid bruits noted.   Abdomen:  Soft, nontender, nondistended, bowel sounds present   Extremities: No cyanosis, clubbing, except mild LE edema.   Skin: No xanthelasma.   Neurologic: Mood and affect are appropriate.             Lab Review   Lab Results   Component Value Date    CREATININE 0.84 11/07/2017    BUN 15 11/07/2017     (L) 11/07/2017    K 4.4 11/07/2017    CL 99 11/07/2017    CO2 24 11/07/2017     Creatinine (mg/dL)   Date Value   11/07/2017 0.84   08/25/2017 1.01   12/29/2016 1.22   11/30/2016 1.11     Device check on 4/25/2018  Type: In clinic every 6 month visit. Seen with Adis Wang RN, CNP.  Presenting Rhythm: Atrial fibrillation with VS and occassional  51bpm  Lead/Battery status: Stable  Arrhythmias: Presumed chronic AF (no RA lead). 452 NSVT and 2 tachy detections. Review of NSVT and tachy detections show 4bt NSVT and RVR  130-180bpm. Pt is asymptomatic and does go dancing twice a week with his wife.  Anticoagulant: Warfarin.  Comments: Normal device function. No changes made.    30  minutes were spent with the patient with greater than 50% spent on education and counseling.      Adis Wang CNP  [facility]

## 2021-06-18 NOTE — LETTER
Letter by Delmi Regan NP at      Author: Delmi Regan NP Service: -- Author Type: --    Filed:  Encounter Date: 3/6/2019 Status: (Other)         Patient: Raymond Zheng   MR Number: [id number]   YOB: 1925   Date of Visit: 3/6/2019                 [facility]    DATE: 3/6/2019    NAME:  Raymond Zheng             :  1925  MRN: [id number]  CODE STATUS:  FULL CODE    VISIT TYPE: H & P     FACILITY:  [facility] [[id number]]       CHIEF COMPLAIN/REASON FOR VISIT:    Chief Complaint   Patient presents with   ? H & P               HISTORY OF PRESENT ILLNESS: Raymond Zheng is a 89 y.o. male who is a new resident to [facility]. He was previously following with Dr. Janes Marie for primary care but has now chosen to follow with in house primary service. He is seen today with his wife to establish care. He has PMH of A fib, CVA, prostate cancer, HTN, knee osteoarthritis, low back pain, lumbar stenosis, pacemaker, vit b12 deficiency, memory issues, mild pulmonary HTN, dependent edema, dyspnea.     Today Mr. Zheng is seen in his apartment with wife Sydni at his side. They report that it has been increasingly difficult for them to get out for appointments anymore. They are no longer able to drive and rely on friends to take them. This is difficult as their friends are busy and they really don't have any family nearby to help out. They would like to switch to the in house providers for dentist and podiatry. They will need to continue with their eye doctors. Raymond also follows with urology for h/o of prostate cancer but he is only being seen about once a year now. He has been following with cardiology as well but they would like to cut back on that as much as possible. His wife reports he has not had a home monitor for his pacemaker, so they recently spoke to his cardiology office and [employer] and are waiting for them to send out a home  monitor.  This will help reduce the number of times they have to go to the clinic. He and his wife report they do have a scale and try to weigh somewhat regularly. He has been around 162lbs and has been steady recently. They do report they have been eating more since moving into assisted living as they are offered more food at meals. Raymond reports his appetite is good and he sleeps well generally. He denies any dizziness or headaches. He has not had any issues with nausea, vomiting, or stomach upset recently. He does have issues with constipation at times but takes prune juice when needed. His wife is requesting they be changed to a stronger stool softener. They were both using some generic one from pharmacy but not sure the name of it. After reviewing meds in medicine cupboard discussed it is colace and can change to senna. They both report they do not want to try miralax. He does report he had a fall shortly after they moved into assisted living. He was in the chair and trying to get up and scratched up his arm. His wife reports he had a skin tear but it is scabbed up now and healed. He has not had any further falls. He had bleeding in his eye a while back and follows with the eye doctor. He also has macular degeneration and does eye drops for this. He has a follow up on Monday again. He has not had any chest pain or swelling in his legs. He has no trouble urinating but takes medication and follows with the urologist every year. He did have radiation seeding in 2004 his wife reports. He had a stroke some time ago but no real deficits from this. His wife reports he is having short term memory issues and they seem to be progressing. She is wondering about medications to help slow this process. She would like to discuss options today. He has some shortness of breath with extreme exertion but he can walk down to the dining small without any issues. It would just be if he was doing stairs or walking fast. He uses the  walker sometimes. He does not have any pain today but his wife reports giving him some tylenol if needed, usually for back pain. He denies any other concerns recently. His wife reports they are now getting meds through in house pharmacy and nurses are setting up meds. Their meals are being provided but otherwise no services unless requesting.     REVIEW OF SYSTEMS:  PROBLEMS AND REVIEW OF SYSTEMS:   Review of Systems  Today on ROS:   Currently, no fever, chills, or rigors. Decreased vision and hearing. Denies any chest pain, headaches, palpitations, lightheadedness, dizziness. Appetite is good. Denies any GERD symptoms. Denies any difficulty with swallowing, nausea, or vomiting.  Denies any abdominal pain, diarrhea or constipation. Denies any urinary symptoms. No insomnia.  Positive for forgetfulness, short term memory issues, ambulates with walker, recent fall, constipation, swelling in legs resolved recently, shortness of breath with extreme exertion      Allergies   Allergen Reactions   ? Ciprofloxacin    ? Erythromycin Base    ? Oxycodone-Acetaminophen    ? Sulfa (Sulfonamide Antibiotics)      Current Outpatient Medications   Medication Sig   ? acetaminophen (TYLENOL) 500 MG tablet Take 500 mg by mouth every 6 (six) hours as needed for pain.   ? atropine 1 % ophthalmic solution 1 drop at bedtime.   ? cyanocobalamin (VITAMIN B-12) 1000 MCG tablet Take 500 mcg by mouth daily.          ? digoxin (LANOXIN) 125 mcg tablet TAKE ONE TABLET BY MOUTH DAILY   ? FOLIC ACID/MV,FE,MIN (CENTRUM ORAL) Take 1 tablet by mouth daily.    ? memantine (NAMENDA XR) 7 mg CSpX Take 7 mg by mouth at bedtime.   ? metoprolol succinate (TOPROL XL) 50 MG 24 hr tablet Take 1 tablet (50 mg total) by mouth daily.   ? nitroglycerin (NITROSTAT) 0.4 MG SL tablet Place 0.4 mg under the tongue as needed for chest pain.   ? prednisoLONE acetate (PRED-FORTE) 1 % ophthalmic suspension 1 drop every 3 (three) hours.   ? senna (SENOKOT) 8.6 mg tablet  Take 1 tablet by mouth daily as needed for constipation.   ? tamsulosin (FLOMAX) 0.4 mg Cp24 Take 1 capsule (0.4 mg total) by mouth daily.   ? torsemide (DEMADEX) 10 MG tablet Take 1 tablet (10 mg total) by mouth daily.   ? warfarin (COUMADIN) 2.5 MG tablet Take 0.5-1 tablets (1.25-2.5 mg total) by mouth daily. Adjust dose per INR results as directed     Past Medical History:    Past Medical History:   Diagnosis Date   ? Anticoagulation goal of INR 2 to 3    ? Atrial fibrillation (H)    ? 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 CVA (cerebral infarction)    ? DNR (do not resuscitate) 10/18/2016   ? Former smoker    ? HTN (hypertension)    ? Knee osteoarthritis    ? Onychomycosis    ? Pacemaker    ? Prostate cancer (H)     Treated with radiation brachytherapy. Followed by [facility].     ? Vitamin B12 deficiency    ? Vitiligo            PHYSICAL EXAMINATION  Vitals:    03/06/19 0700   BP: 145/78   Pulse: 77   Resp: 18   Temp: 96.7  F (35.9  C)   SpO2: 100%       Today on physical exam:     GENERAL: Awake, Alert, oriented x3, not in any form of acute distress, answers questions appropriately, follows simple commands, conversant  HEENT: Head is normocephalic with normal hair distribution. No evidence of trauma. Ears: No acute purulent discharge. Eyes: Conjunctivae pink with no scleral jaundice. Nose: Normal mucosa and septum. NECK: Supple with no cervical or supraclavicular lymphadenopathy. Trachea is midline. Glasses, hearing aids  CHEST: No tenderness or deformity, no crepitus  LUNG: dim to auscultation with good chest expansion. There are no crackles or wheezes, normal AP diameter. Shortness of breath with extreme exertion  BACK: No kyphosis of the thoracic spine. Symmetric, no curvature, ROM normal, no CVA tenderness, no spinal tenderness; lumbar pain at times, denies today  CVS: irregularly irregular rhythm, systolic murmur,  2+ pulses symmetric in all extremities.  ABDOMEN: Rounded and soft,  nontender to palpation, non distended, no masses, no organomegaly, good bowel sounds, no rebound or guarding, no peritoneal signs.   EXTREMITIES: No pedal edema, darin discoloration, left arm skin tear scabbed and healed  SKIN: Warm and dry,   NEUROLOGICAL: The patient is oriented to person, place and time. Forgetful at times, short term memory issues, no numb/tingling ble.            LABS:   No results found for this or any previous visit (from the past 168 hour(s)).  Results for orders placed or performed during the hospital encounter of 05/07/18   Basic Metabolic Panel   Result Value Ref Range    Sodium 134 (L) 136 - 145 mmol/L    Potassium 4.5 3.5 - 5.0 mmol/L    Chloride 99 98 - 107 mmol/L    CO2 23 22 - 31 mmol/L    Anion Gap, Calculation 12 5 - 18 mmol/L    Glucose 104 70 - 125 mg/dL    Calcium 10.0 8.5 - 10.5 mg/dL    BUN 18 8 - 28 mg/dL    Creatinine 1.12 0.70 - 1.30 mg/dL    GFR MDRD Af Amer >60 >60 mL/min/1.73m2    GFR MDRD Non Af Amer >60 >60 mL/min/1.73m2         Lab Results   Component Value Date    WBC 7.5 02/01/2019    HGB 13.1 (L) 02/01/2019    HCT 38.9 (L) 02/01/2019    MCV 95 02/01/2019     02/01/2019       Lab Results   Component Value Date    MZBQKPEG16 796 10/02/2018     No results found for: HGBA1C  Lab Results   Component Value Date    INR 2.46 (H) 03/01/2019    INR 2.70 (H) 02/01/2019    INR 2.30 (H) 01/09/2019     No results found for: [id number]  Lab Results   Component Value Date    TSH 2.08 08/04/2015           ASSESSMENT/PLAN:    1. Atrial fibrillation: Rate controlled in 70s. No chest pain or palpitations. On digoxin, metoprolol, warfarin. Anticoagulation clinic now managing warfarin in house. INR last on 3/1 2.46, stable and recheck in 4 weeks.   2. HTN: SBP 140s. On metoprolol. Appears stable.   3. S/p pacemaker: Follows with cardiology, [employer]. Recently requested home monitor so not have to go in for as frequent of checks.   4. Pulmonary hypertension, Dyspnea:  Only with extreme exertion. No chest pain or recent concerns. Follows with cardiology.   5. Dependent edema: No edema today. Per his report resolved after starting torsemide daily. Will continue for now. Recommended weighing frequently-162lbs recently and stable. Tries to elevate legs in recliner, does not feel gaurav hose are doable for him with back pain/stenosis.   6. H/o prostate cancer: No recent issues. On flomax. Radiation seeding in 2004. No recurrence. F/u with urology annually.   7. Lumbar stenosis, chronic back pain: Denies pain today. None recently but does take tylenol prn. No bowel or bladder incontinence, no numb/tingling ble.   8. Cognitive impairment, short term memory issues: Discussed in depth today options for treatment and risks v benefits. Will trial namenda 7mg xr at bedtime and monitor.   9. H/o CVA: No residual deficits but does have issues with memory which may or may not be related. Possibly vascular dementia component. CT head in may of 2018 showed increase in periventricular low attenuation when compared to 8/22/2009 suggesting progress of chronic microvascular ischemic changes. On warfarin. No benefit of statin given age and risk v benefit.   10. Macular degeneration, intraocular hemorrhage: Follows with Dr. Bill Figueroa. On atropine and prednisolone gtts. No injections as of yet.   11. Constipation: On colace prn and taking prune juice. Will order senna daily prn.   12. Vitamin b 12 deficiency: On vitamin b 12 daily.     Labs recently checked in February 2019, b12 level in October 2018. Will hold off on labs today and review at next visit in 3 months.     Electronically signed by: Delmi Regan NP    Total time spent with patient 60 minutes for admission history and physical, 40 min of which was spent on counseling and coordination of care. Discussed extensively diagnoses, medical history, medication list, lab results. Discussed current symptoms and transition to assisted living in  regards to treatments, specialty visits, and ongoing lab and imaging monitoring. Discussed memory issues and cognitive testing as well as treatment options. Coordinated care with director of nursing as well as home care nurses in regards to medication set up, changes in services, recent fall, and establishing care with in house providers.

## 2021-06-18 NOTE — PROGRESS NOTES
Wt Readings from Last 20 Encounters:   05/22/18 160 lb (72.6 kg)   05/15/18 159 lb (72.1 kg)   05/07/18 165 lb (74.8 kg)   04/25/18 165 lb (74.8 kg)   02/19/18 165 lb (74.8 kg)   01/15/18 165 lb 4.8 oz (75 kg)   12/18/17 161 lb (73 kg)   11/15/17 161 lb (73 kg)   11/14/17 162 lb (73.5 kg)   11/10/17 159 lb (72.1 kg)   11/01/17 156 lb (70.8 kg)   11/01/17 155 lb (70.3 kg)   10/27/17 156 lb (70.8 kg)   10/20/17 157 lb (71.2 kg)   10/16/17 153 lb (69.4 kg)   10/06/17 153 lb 9.6 oz (69.7 kg)   10/04/17 156 lb (70.8 kg)   08/25/17 155 lb (70.3 kg)   08/14/17 157 lb (71.2 kg)   07/19/17 157 lb (71.2 kg)

## 2021-06-19 NOTE — LETTER
Letter by Delmi Regan NP at      Author: Delmi Regan NP Service: -- Author Type: --    Filed:  Encounter Date: 2019 Status: (Other)         Patient: Raymond Zheng   MR Number: [id number]   YOB: 1925   Date of Visit: 2019                 [facility]    DATE: 2019    NAME:  Raymond Zheng             :  1925  MRN: [id number]  CODE STATUS:  POLST on file    VISIT TYPE: Review Of Multiple Medical Conditions     FACILITY:  [facility] [[id number]]       CHIEF COMPLAIN/REASON FOR VISIT:    Chief Complaint   Patient presents with   ? Review Of Multiple Medical Conditions               HISTORY OF PRESENT ILLNESS: Raymond Zheng is a 89 y.o. male who is a resident of [facility]. He has PMH of A fib, CVA, prostate cancer, HTN, knee osteoarthritis, low back pain, lumbar stenosis, pacemaker, vit b12 deficiency, memory issues, mild pulmonary HTN, dependent edema, dyspnea.     Today Mr. Zheng is seen for review of systems today. He reports that he has been doing well since his last visit in May. He reports no recent illnesses or sicknesses. He is not having any issues with seasonal allergies or colds. He denies any shortness of breath or cough. He has not felt dizzy or lightheaded at all and no recent falls. His meds seem fine and no concerns with them. Since last visit wife was noted to mixup their meds twice but has not happened for some time now. He is using his walker to get around and says this works well for him. He likes the food here and eats very well. He sleeps fine and his bowels are moving much better on the bowel regimen. He denies any recent nausea, vomiting, stomach upset. His back pain is controlled on current meds and seeing a chiropractor regularly. He denies nay trouble urinating and no recent incontinence. He has had the same glasses for some time but has an appt with the eye doctor in one week. He sees them every 3  months and is not doing any injections, just the eye drops. He denies any other recent specialty appointments. His wife asks about his INR and how this has been doing. Otherwise his wife feels he is doing well and she has no concerns with him today.     REVIEW OF SYSTEMS:  PROBLEMS AND REVIEW OF SYSTEMS:   Review of Systems  Today on ROS:   Currently, no fever, chills, or rigors. Decreased vision and hearing. Denies any chest pain, headaches, palpitations, lightheadedness, dizziness. Appetite is good. Denies any GERD symptoms. Denies any difficulty with swallowing, nausea, or vomiting.  Denies any abdominal pain, diarrhea or constipation. Denies any urinary symptoms. No insomnia.  Positive for forgetfulness, short term memory issues, ambulates with walker, no shortness of breath, back pain controlled, wife manages meds, dining small for meals      Allergies   Allergen Reactions   ? Ciprofloxacin    ? Erythromycin Base    ? Oxycodone-Acetaminophen    ? Sulfa (Sulfonamide Antibiotics)      Current Outpatient Medications   Medication Sig   ? acetaminophen (TYLENOL) 500 MG tablet Take 1,000 mg by mouth 3 (three) times a day.          ? atropine 1 % ophthalmic solution 1 drop at bedtime.   ? cyanocobalamin (VITAMIN B-12) 1000 MCG tablet Take 500 mcg by mouth daily.          ? FOLIC ACID/MV,FE,MIN (CENTRUM ORAL) Take 1 tablet by mouth daily.    ? memantine (NAMENDA XR) 7 mg CSpX Take 7 mg by mouth at bedtime.   ? metoprolol succinate (TOPROL XL) 50 MG 24 hr tablet Take 1 tablet (50 mg total) by mouth daily.   ? nitroglycerin (NITROSTAT) 0.4 MG SL tablet Place 0.4 mg under the tongue as needed for chest pain.   ? polyethylene glycol (MIRALAX) 17 gram packet Take 17 g by mouth daily as needed.   ? prednisoLONE acetate (PRED-FORTE) 1 % ophthalmic suspension 1 drop every 3 (three) hours.   ? senna (SENOKOT) 8.6 mg tablet Take 2 tablets by mouth daily.          ? tamsulosin (FLOMAX) 0.4 mg Cp24 Take 1 capsule (0.4 mg total) by  mouth daily.   ? trolamine salicylate (ASPERCREME) 10 % cream Apply 1 application topically every 6 (six) hours as needed.          ? warfarin (COUMADIN) 2.5 MG tablet Take 0.5-1 tablets (1.25-2.5 mg total) by mouth daily. Adjust dose per INR results as directed     Past Medical History:    Past Medical History:   Diagnosis Date   ? Anticoagulation goal of INR 2 to 3    ? Atrial fibrillation (H)    ? 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 CVA (cerebral infarction)    ? DNR (do not resuscitate) 10/18/2016   ? Former smoker    ? HTN (hypertension)    ? Knee osteoarthritis    ? Onychomycosis    ? Pacemaker    ? Prostate cancer (H)     Treated with radiation brachytherapy. Followed by [facility].     ? Vitamin B12 deficiency    ? Vitiligo            PHYSICAL EXAMINATION  Vitals:    09/04/19 0700   BP: 128/66   Pulse: 60   Resp: 18   Temp: 96.8  F (36  C)   SpO2: 94%       Today on physical exam:     GENERAL: Awake, Alert, oriented x3, not in any form of acute distress, answers questions appropriately, follows simple commands, conversant  HEENT: Head is normocephalic with normal hair distribution. No evidence of trauma. Ears: No acute purulent discharge. Eyes: Conjunctivae pink with no scleral jaundice. Nose: Normal mucosa and septum. NECK: Supple with no cervical or supraclavicular lymphadenopathy. Trachea is midline. Glasses, hearing aids  CHEST: No tenderness or deformity, no crepitus  LUNG: dim to auscultation with good chest expansion. There are no crackles or wheezes, normal AP diameter. No sob  BACK: chronic lumbar pain, localized no radiation, no numb/tingling  CVS: irregularly irregular rhythm, systolic murmur,  2+ pulses symmetric in all extremities.  ABDOMEN: Rounded and soft, nontender to palpation, non distended, no masses, no organomegaly, good bowel sounds, no rebound or guarding, no peritoneal signs.   EXTREMITIES: No pedal edema, darin discoloration ble  SKIN: Warm and dry,    NEUROLOGICAL: The patient is oriented to person, place and time. Forgetful at times, short term memory issues, no numb/tingling ble.            LABS:   Recent Results (from the past 168 hour(s))   INR   Result Value Ref Range    INR 2.71 (H) 0.90 - 1.10     Results for orders placed or performed during the hospital encounter of 05/07/18   Basic Metabolic Panel   Result Value Ref Range    Sodium 134 (L) 136 - 145 mmol/L    Potassium 4.5 3.5 - 5.0 mmol/L    Chloride 99 98 - 107 mmol/L    CO2 23 22 - 31 mmol/L    Anion Gap, Calculation 12 5 - 18 mmol/L    Glucose 104 70 - 125 mg/dL    Calcium 10.0 8.5 - 10.5 mg/dL    BUN 18 8 - 28 mg/dL    Creatinine 1.12 0.70 - 1.30 mg/dL    GFR MDRD Af Amer >60 >60 mL/min/1.73m2    GFR MDRD Non Af Amer >60 >60 mL/min/1.73m2         Lab Results   Component Value Date    WBC 8.6 05/29/2019    HGB 11.2 (L) 05/29/2019    HCT 35.3 (L) 05/29/2019     05/29/2019     05/29/2019       Lab Results   Component Value Date    [id number] 796 10/02/2018     No results found for: HGBA1C  Lab Results   Component Value Date    INR 2.71 (H) 09/06/2019    INR 3.04 (H) 08/23/2019    INR 2.58 (H) 07/26/2019     No results found for: [id number]  Lab Results   Component Value Date    TSH 2.08 08/04/2015           ASSESSMENT/PLAN:    1. Atrial fibrillation: Rate controlled 60s. No chest pain or palpitations. On metoprolol, warfarin. Anticoagulation clinic now managing warfarin in house. Stable recently. Last INR 3.04 on 8/23, recheck on 9/6.   2. HTN: SBP 120s. On metoprolol. No recent concerns.   3. S/p pacemaker: Follows with cardiology, [employer]. 7/29 device check-stable.   4. Pulmonary hypertension, Dyspnea: No recent shortness of breath. No chest pain or recent concerns. Follows with cardiology prn.   5. Cognitive impairment, vascular dementia:  on namenda 7mg xr. Stable recently. No further progression last few months.   6. H/o prostate cancer: No recent issues. On flomax.  Radiation seeding in 2004. No recurrence. F/u with urology prn-was following annually but no recent appointments. More difficult for him to get out.   7. Lumbar stenosis, chronic back pain: No radiation, no numb/tingling. On tylenol three times a day and aspercreme. Sees chiropractor regularly in house. No recent concerns and feels controlled.   8. Macular degeneration, intraocular hemorrhage: Follows with Dr. Bill Figueroa. On atropine and prednisolone gtts. appt next week.   9. H/o CVA: On warfarin, no recent concerns.   10. Vitamin b 12 deficiency: On vitamin b 12 daily.  11. Constipation: On colace prn and taking prune juice. senna daily prn.     Labs in May 2019    Electronically signed by: Delmi Regan NP

## 2021-06-19 NOTE — LETTER
Letter by Lily Jung RDCS at      Author: Lily Jung RDCS Service: -- Author Type: --    Filed:  Encounter Date: 4/5/2019 Status: (Other)         Raymond Zheng  [address]  [place] [address]      April 5, 2019      Dear Mr. Zheng,    RE: Remote Results    We are writing to you regarding your recent Remote Pacemaker check from home. Your transmission was received successfully. Battery status is satisfactory at this time.     Your results are within normal limits.    Your next device appointment will be a remote check on July 9, 2019; this will occur automatically.    To schedule or reschedule, please call [phone] and press 1.    NOTE: If you would like to do an extra transmission, please call [phone] and press 3 to speak to a nurse BEFORE transmitting. This ensures that the Device Clinic staff is aware of the reason you are sending a transmission, and can follow-up with you after it has been reviewed.    We will be checking your implanted device from home (remotely) every three months unless otherwise instructed. We will need to see you in the clinic at least once a year. You may need to be seen in the clinic sooner depending on the results of your check.    Please be aware:    The follow-up schedule is like a Physician prescription.    Your remote monitor is paired to your specific implanted device.      Sincerely,    [facility]

## 2021-06-19 NOTE — LETTER
Letter by Delmi Regan NP at      Author: Delmi Regan NP Service: -- Author Type: --    Filed:  Encounter Date: 2019 Status: (Other)         Patient: Raymond Zheng   MR Number: [id number]   YOB: 1925   Date of Visit: 2019                 [facility]    DATE: 2019    NAME:  Raymond Zheng             :  1925  MRN: [id number]  CODE STATUS:  FULL CODE    VISIT TYPE: Problem Visit     FACILITY:  [facility] [[id number]]       CHIEF COMPLAIN/REASON FOR VISIT:    Chief Complaint   Patient presents with   ? Problem Visit               HISTORY OF PRESENT ILLNESS: Raymond Zheng is a 89 y.o. male who is a resident of [facility]. He has PMH of A fib, CVA, prostate cancer, HTN, knee osteoarthritis, low back pain, lumbar stenosis, pacemaker, vit b12 deficiency, memory issues, mild pulmonary HTN, dependent edema, dyspnea.     Today Mr. Zheng is seen for follow up on fall last week, skin tear to left arm, elevated blood pressure and low heart rate on vitals last week. Today he reports he has been doing fine and no further falls. His skin tear is scabbed and healing. He is not having any dizziness or headaches. He denies any visual changes, chest pain, chest pressure, palpitations, or shortness of breath with exertion. He had some swelling in his ankles but they are much better now. He sleeps in the recliner most nights because of his pain but last night his wife reports he was able to sleep in the bed all night. His pain in back seems improved since making those med adjustments few weeks ago. His appetite is good and no issues with bowels or stomach upset. He was having issues with constipation but now improved. He denies any pain in legs the pain just stays in his low back. He has not had any recent illnesses and no cough or allergy symptoms. He is not using the aspercreme as he is controlled on the tylenol. He would like to use it  if needed though. His wife reports no other concerns and he is certainly not complaining about his back as much.     REVIEW OF SYSTEMS:  PROBLEMS AND REVIEW OF SYSTEMS:   Review of Systems  Today on ROS:   Currently, no fever, chills, or rigors. Decreased vision and hearing. Denies any chest pain, headaches, palpitations, lightheadedness, dizziness. Appetite is good. Denies any GERD symptoms. Denies any difficulty with swallowing, nausea, or vomiting.  Denies any abdominal pain, diarrhea or constipation. Denies any urinary symptoms. No insomnia.  Positive for forgetfulness, short term memory issues, ambulates with walker, no shortness of breath, back pain controlled, fall last week, skin tear to left arm scabbed      Allergies   Allergen Reactions   ? Ciprofloxacin    ? Erythromycin Base    ? Oxycodone-Acetaminophen    ? Sulfa (Sulfonamide Antibiotics)      Current Outpatient Medications   Medication Sig   ? acetaminophen (TYLENOL) 500 MG tablet Take 1,000 mg by mouth 3 (three) times a day.          ? atropine 1 % ophthalmic solution 1 drop at bedtime.   ? cyanocobalamin (VITAMIN B-12) 1000 MCG tablet Take 500 mcg by mouth daily.          ? FOLIC ACID/MV,FE,MIN (CENTRUM ORAL) Take 1 tablet by mouth daily.    ? memantine (NAMENDA XR) 7 mg CSpX Take 7 mg by mouth at bedtime.   ? metoprolol succinate (TOPROL XL) 50 MG 24 hr tablet Take 1 tablet (50 mg total) by mouth daily.   ? nitroglycerin (NITROSTAT) 0.4 MG SL tablet Place 0.4 mg under the tongue as needed for chest pain.   ? polyethylene glycol (MIRALAX) 17 gram packet Take 17 g by mouth daily as needed.   ? prednisoLONE acetate (PRED-FORTE) 1 % ophthalmic suspension 1 drop every 3 (three) hours.   ? senna (SENOKOT) 8.6 mg tablet Take 2 tablets by mouth daily.          ? tamsulosin (FLOMAX) 0.4 mg Cp24 Take 1 capsule (0.4 mg total) by mouth daily.   ? trolamine salicylate (ASPERCREME) 10 % cream Apply 1 application topically every 6 (six) hours as needed.           ? warfarin (COUMADIN) 2.5 MG tablet Take 0.5-1 tablets (1.25-2.5 mg total) by mouth daily. Adjust dose per INR results as directed     Past Medical History:    Past Medical History:   Diagnosis Date   ? Anticoagulation goal of INR 2 to 3    ? Atrial fibrillation (H)    ? 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 CVA (cerebral infarction)    ? DNR (do not resuscitate) 10/18/2016   ? Former smoker    ? HTN (hypertension)    ? Knee osteoarthritis    ? Onychomycosis    ? Pacemaker    ? Prostate cancer (H)     Treated with radiation brachytherapy. Followed by [facility].     ? Vitamin B12 deficiency    ? Vitiligo            PHYSICAL EXAMINATION  Vitals:    05/29/19 0700   BP: 159/89   Pulse: 82   Resp: 18   Temp: 97.7  F (36.5  C)   SpO2: 97%   Weight: 156 lb (70.8 kg)       Today on physical exam:     GENERAL: Awake, Alert, oriented x3, not in any form of acute distress, answers questions appropriately, follows simple commands, conversant  HEENT: Head is normocephalic with normal hair distribution. No evidence of trauma. Ears: No acute purulent discharge. Eyes: Conjunctivae pink with no scleral jaundice. Nose: Normal mucosa and septum. NECK: Supple with no cervical or supraclavicular lymphadenopathy. Trachea is midline. Glasses, hearing aids  CHEST: No tenderness or deformity, no crepitus  LUNG: dim to auscultation with good chest expansion. There are no crackles or wheezes, normal AP diameter. No sob  BACK: No kyphosis of the thoracic spine. Symmetric, no curvature, ROM normal, no CVA tenderness, no spinal tenderness; lumbar pain today, localized no radiation, no numb/tingling  CVS: irregularly irregular rhythm, systolic murmur,  2+ pulses symmetric in all extremities.  ABDOMEN: Rounded and soft, nontender to palpation, non distended, no masses, no organomegaly, good bowel sounds, no rebound or guarding, no peritoneal signs.   EXTREMITIES: No pedal edema, darin discoloration, left arm skin tear  healed  SKIN: Warm and dry,   NEUROLOGICAL: The patient is oriented to person, place and time. Forgetful at times, short term memory issues, no numb/tingling ble.            LABS:   Recent Results (from the past 168 hour(s))   Comprehensive Metabolic Panel   Result Value Ref Range    Sodium 138 136 - 145 mmol/L    Potassium 4.3 3.5 - 5.0 mmol/L    Chloride 106 98 - 107 mmol/L    CO2 24 22 - 31 mmol/L    Anion Gap, Calculation 8 5 - 18 mmol/L    Glucose 60 (L) 70 - 125 mg/dL    BUN 30 (H) 8 - 28 mg/dL    Creatinine 1.28 0.70 - 1.30 mg/dL    GFR MDRD Af Amer >60 >60 mL/min/1.73m2    GFR MDRD Non Af Amer 52 (L) >60 mL/min/1.73m2    Bilirubin, Total 0.9 0.0 - 1.0 mg/dL    Calcium 10.5 8.5 - 10.5 mg/dL    Protein, Total 7.5 6.0 - 8.0 g/dL    Albumin 3.8 3.5 - 5.0 g/dL    Alkaline Phosphatase 151 (H) 45 - 120 U/L    AST 28 0 - 40 U/L    ALT 16 0 - 45 U/L   HM1 (CBC with Diff)   Result Value Ref Range    WBC 8.9 4.0 - 11.0 thou/uL    RBC 3.43 (L) 4.40 - 6.20 mill/uL    Hemoglobin 11.0 (L) 14.0 - 18.0 g/dL    Hematocrit 35.0 (L) 40.0 - 54.0 %     (H) 80 - 100 fL    MCH 32.1 27.0 - 34.0 pg    MCHC 31.4 (L) 32.0 - 36.0 g/dL    RDW 14.8 (H) 11.0 - 14.5 %    Platelets 344 140 - 440 thou/uL    MPV 11.0 8.5 - 12.5 fL    Neutrophils % 77 (H) 50 - 70 %    Lymphocytes % 13 (L) 20 - 40 %    Monocytes % 8 2 - 10 %    Eosinophils % 1 0 - 6 %    Basophils % 0 0 - 2 %    Neutrophils Absolute 6.8 2.0 - 7.7 thou/uL    Lymphocytes Absolute 1.1 0.8 - 4.4 thou/uL    Monocytes Absolute 0.7 0.0 - 0.9 thou/uL    Eosinophils Absolute 0.1 0.0 - 0.4 thou/uL    Basophils Absolute 0.0 0.0 - 0.2 thou/uL   Comprehensive Metabolic Panel   Result Value Ref Range    Sodium 138 136 - 145 mmol/L    Potassium 4.3 3.5 - 5.0 mmol/L    Chloride 105 98 - 107 mmol/L    CO2 24 22 - 31 mmol/L    Anion Gap, Calculation 9 5 - 18 mmol/L    Glucose 77 70 - 125 mg/dL    BUN 27 8 - 28 mg/dL    Creatinine 1.15 0.70 - 1.30 mg/dL    GFR MDRD Af Amer >60 >60  mL/min/1.73m2    GFR MDRD Non Af Amer 59 (L) >60 mL/min/1.73m2    Bilirubin, Total 1.0 0.0 - 1.0 mg/dL    Calcium 10.3 8.5 - 10.5 mg/dL    Protein, Total 7.4 6.0 - 8.0 g/dL    Albumin 3.7 3.5 - 5.0 g/dL    Alkaline Phosphatase 151 (H) 45 - 120 U/L    AST 22 0 - 40 U/L    ALT 13 0 - 45 U/L   HM1 (CBC with Diff)   Result Value Ref Range    WBC 8.6 4.0 - 11.0 thou/uL    RBC 3.53 (L) 4.40 - 6.20 mill/uL    Hemoglobin 11.2 (L) 14.0 - 18.0 g/dL    Hematocrit 35.3 (L) 40.0 - 54.0 %     80 - 100 fL    MCH 31.7 27.0 - 34.0 pg    MCHC 31.7 (L) 32.0 - 36.0 g/dL    RDW 14.7 (H) 11.0 - 14.5 %    Platelets 355 140 - 440 thou/uL    MPV 10.9 8.5 - 12.5 fL    Neutrophils % 67 50 - 70 %    Lymphocytes % 23 20 - 40 %    Monocytes % 8 2 - 10 %    Eosinophils % 3 0 - 6 %    Basophils % 0 0 - 2 %    Neutrophils Absolute 5.7 2.0 - 7.7 thou/uL    Lymphocytes Absolute 1.9 0.8 - 4.4 thou/uL    Monocytes Absolute 0.7 0.0 - 0.9 thou/uL    Eosinophils Absolute 0.2 0.0 - 0.4 thou/uL    Basophils Absolute 0.0 0.0 - 0.2 thou/uL     Results for orders placed or performed during the hospital encounter of 05/07/18   Basic Metabolic Panel   Result Value Ref Range    Sodium 134 (L) 136 - 145 mmol/L    Potassium 4.5 3.5 - 5.0 mmol/L    Chloride 99 98 - 107 mmol/L    CO2 23 22 - 31 mmol/L    Anion Gap, Calculation 12 5 - 18 mmol/L    Glucose 104 70 - 125 mg/dL    Calcium 10.0 8.5 - 10.5 mg/dL    BUN 18 8 - 28 mg/dL    Creatinine 1.12 0.70 - 1.30 mg/dL    GFR MDRD Af Amer >60 >60 mL/min/1.73m2    GFR MDRD Non Af Amer >60 >60 mL/min/1.73m2         Lab Results   Component Value Date    WBC 8.6 05/29/2019    HGB 11.2 (L) 05/29/2019    HCT 35.3 (L) 05/29/2019     05/29/2019     05/29/2019       Lab Results   Component Value Date    [id number] 796 10/02/2018     No results found for: HGBA1C  Lab Results   Component Value Date    INR 2.03 (H) 05/17/2019    INR 2.09 (H) 05/10/2019    INR 1.84 (H) 05/03/2019     No results found for:  [id number]  Lab Results   Component Value Date    TSH 2.08 08/04/2015           ASSESSMENT/PLAN:    1. Atrial fibrillation: Rate controlled. No chest pain or palpitations. On metoprolol, warfarin. Anticoagulation clinic now managing warfarin in house. INR 2.03 on 5/17 recheck 2 weeks. Bradycardic last week in low 50s, stopped digoxin and now better controlled in 80s. No palpitations or heart racing, no chest pain or pressure. No dizziness.   2. HTN: SBP 150s. On metoprolol. Appears stable. Last week hypertensive 160s, mildly improved today.   3. S/p pacemaker: Follows with cardiology, [facility]. 7/9 device check.   4. Pulmonary hypertension, Dyspnea: No recent shortness of breath. No chest pain or recent concerns. Follows with cardiology.   5. Dependent edema: No edema today.  [id number]. Tries to elevate legs in recliner, does not feel gaurav hose are doable for him with back pain/stenosis. Off torsemide, no edema today and no shortness of breath. Discussed weight loss and feels this must be error because he is eating well. Only 2 weights available.   6. H/o prostate cancer: No recent issues. On flomax. Radiation seeding in 2004. No recurrence. F/u with urology annually.   7. Lumbar stenosis, chronic back pain: No radiation, no numb/tingling. On tylenol three times a day and aspercreme two times a day and prn. No bowel or bladder incontinence, no numb/tingling ble. Improved control on tylenol did not use aspercreme. Will change to q6h prn only.   8. Cognitive impairment, short term memory issues:  on namenda 7mg xr at bedtime and monitor.   9. H/o CVA: No residual deficits but does have issues with memory which may or may not be related. Possibly vascular dementia component. CT head in may of 2018 showed increase in periventricular low attenuation when compared to 8/22/2009 suggesting progress of chronic microvascular ischemic changes. On warfarin. No benefit of statin given age and risk v benefit.    10. Macular degeneration, intraocular hemorrhage: Follows with Dr. Bill Figueroa. On atropine and prednisolone gtts. No injections as of yet.   11. Constipation: On colace prn and taking prune juice. senna daily prn.   12. Vitamin b 12 deficiency: On vitamin b 12 daily.   13. Fall: left arm skin tear healed. No further falls. Monitor.     [facility], 1 on 5/29-stable, discussed results with patient and wife    Electronically signed by: Delmi Regan NP      Total floor/unit time spent 40 min with 25 min spent on counseling and coordination of care. Counseling regarding hypertensive management, bradycardia management, back pain management, fall, lab results, and need for further monitoring. Coordinated care with nursing for management of labs, back pain, fall, chf.

## 2021-06-19 NOTE — LETTER
Letter by Delmi Regan NP at      Author: Delmi Regan NP Service: -- Author Type: --    Filed:  Encounter Date: 2019 Status: (Other)         Patient: Raymond Zheng   MR Number: [id number]   YOB: 1925   Date of Visit: 2019                 [facility]    DATE: 2019    NAME:  Raymond Zheng             :  1925  MRN: [id number]  CODE STATUS:  FULL CODE    VISIT TYPE: Problem Visit (back pain)     FACILITY:  [facility] [[id number]]       CHIEF COMPLAIN/REASON FOR VISIT:    Chief Complaint   Patient presents with   ? Problem Visit     back pain               HISTORY OF PRESENT ILLNESS: Raymond Zheng is a 89 y.o. male who is a resident of [facility]. He has PMH of A fib, CVA, prostate cancer, HTN, knee osteoarthritis, low back pain, lumbar stenosis, pacemaker, vit b12 deficiency, memory issues, mild pulmonary HTN, dependent edema, dyspnea.     Today Mr. Zheng is seen with his wife at side per her request for increased back pain and questioning whether gabapentin would be appropriate. They are about to head down to lunch so do not have long to visit. Sydni reports that Raymond has been complaining of increased back pain recently but she is not sure if it radiates to his legs or not. She just noticed he seems to complain more and she is on gabapentin which helps her back and thought maybe it would be appropriate for him. He reports that his pain does not radiate to legs but stays in low back. He denies numbness and tingling. He denies any issues with his bowels or bladder. He is not having dizziness or other concerns. His gait seems the same and he uses his walker to get around. His wife says he has completed PT but is doing acupuncture and seeing the chiropractor for his back. She is wondering about tylenol or something else. Discussed pain management options and do not recommend gabapentin as does not seem to be nerve related  pain. Will try aspercreme two times a day and prn and tylenol three times a day. They will notify if his pain is not controlled to the nursing staff.      REVIEW OF SYSTEMS:  PROBLEMS AND REVIEW OF SYSTEMS:   Review of Systems  Today on ROS:   Currently, no fever, chills, or rigors. Decreased vision and hearing. Denies any chest pain, headaches, palpitations, lightheadedness, dizziness. Appetite is good. Denies any GERD symptoms. Denies any difficulty with swallowing, nausea, or vomiting.  Denies any abdominal pain, diarrhea or constipation. Denies any urinary symptoms. No insomnia.  Positive for forgetfulness, short term memory issues, ambulates with walker, shortness of breath with extreme exertion, increased back pain      Allergies   Allergen Reactions   ? Ciprofloxacin    ? Erythromycin Base    ? Oxycodone-Acetaminophen    ? Sulfa (Sulfonamide Antibiotics)      Current Outpatient Medications   Medication Sig   ? trolamine salicylate (ASPERCREME) 10 % cream Apply 1 application topically 2 (two) times a day.   ? acetaminophen (TYLENOL) 500 MG tablet Take 1,000 mg by mouth 3 (three) times a day.          ? atropine 1 % ophthalmic solution 1 drop at bedtime.   ? cyanocobalamin (VITAMIN B-12) 1000 MCG tablet Take 500 mcg by mouth daily.          ? digoxin (LANOXIN) 125 mcg tablet TAKE ONE TABLET BY MOUTH DAILY   ? FOLIC ACID/MV,FE,MIN (CENTRUM ORAL) Take 1 tablet by mouth daily.    ? memantine (NAMENDA XR) 7 mg CSpX Take 7 mg by mouth at bedtime.   ? metoprolol succinate (TOPROL XL) 50 MG 24 hr tablet Take 1 tablet (50 mg total) by mouth daily.   ? nitroglycerin (NITROSTAT) 0.4 MG SL tablet Place 0.4 mg under the tongue as needed for chest pain.   ? polyethylene glycol (MIRALAX) 17 gram packet Take 17 g by mouth daily as needed.   ? prednisoLONE acetate (PRED-FORTE) 1 % ophthalmic suspension 1 drop every 3 (three) hours.   ? senna (SENOKOT) 8.6 mg tablet Take 2 tablets by mouth daily.          ? tamsulosin (FLOMAX)  0.4 mg Cp24 Take 1 capsule (0.4 mg total) by mouth daily.   ? warfarin (COUMADIN) 2.5 MG tablet Take 0.5-1 tablets (1.25-2.5 mg total) by mouth daily. Adjust dose per INR results as directed     Past Medical History:    Past Medical History:   Diagnosis Date   ? Anticoagulation goal of INR 2 to 3    ? Atrial fibrillation (H)    ? 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 CVA (cerebral infarction)    ? DNR (do not resuscitate) 10/18/2016   ? Former smoker    ? HTN (hypertension)    ? Knee osteoarthritis    ? Onychomycosis    ? Pacemaker    ? Prostate cancer (H)     Treated with radiation brachytherapy. Followed by [facility].     ? Vitamin B12 deficiency    ? Vitiligo            PHYSICAL EXAMINATION  There were no vitals filed for this visit.    Today on physical exam:     GENERAL: Awake, Alert, oriented x3, not in any form of acute distress, answers questions appropriately, follows simple commands, conversant  HEENT: Head is normocephalic with normal hair distribution. No evidence of trauma. Ears: No acute purulent discharge. Eyes: Conjunctivae pink with no scleral jaundice. Nose: Normal mucosa and septum. NECK: Supple with no cervical or supraclavicular lymphadenopathy. Trachea is midline. Glasses, hearing aids  CHEST: No tenderness or deformity, no crepitus  LUNG: dim to auscultation with good chest expansion. There are no crackles or wheezes, normal AP diameter. Shortness of breath with extreme exertion  BACK: No kyphosis of the thoracic spine. Symmetric, no curvature, ROM normal, no CVA tenderness, no spinal tenderness; lumbar pain today, localized no radiation, no numb/tingling  CVS: irregularly irregular rhythm, systolic murmur,  2+ pulses symmetric in all extremities.  ABDOMEN: Rounded and soft, nontender to palpation, non distended, no masses, no organomegaly, good bowel sounds, no rebound or guarding, no peritoneal signs.   EXTREMITIES: No pedal edema, darin discoloration, left arm skin  tear scabbed and healed  SKIN: Warm and dry,   NEUROLOGICAL: The patient is oriented to person, place and time. Forgetful at times, short term memory issues, no numb/tingling ble.            LABS:   Recent Results (from the past 168 hour(s))   INR   Result Value Ref Range    INR 2.09 (H) 0.90 - 1.10     Results for orders placed or performed during the hospital encounter of 05/07/18   Basic Metabolic Panel   Result Value Ref Range    Sodium 134 (L) 136 - 145 mmol/L    Potassium 4.5 3.5 - 5.0 mmol/L    Chloride 99 98 - 107 mmol/L    CO2 23 22 - 31 mmol/L    Anion Gap, Calculation 12 5 - 18 mmol/L    Glucose 104 70 - 125 mg/dL    Calcium 10.0 8.5 - 10.5 mg/dL    BUN 18 8 - 28 mg/dL    Creatinine 1.12 0.70 - 1.30 mg/dL    GFR MDRD Af Amer >60 >60 mL/min/1.73m2    GFR MDRD Non Af Amer >60 >60 mL/min/1.73m2         Lab Results   Component Value Date    WBC 7.5 02/01/2019    HGB 13.1 (L) 02/01/2019    HCT 38.9 (L) 02/01/2019    MCV 95 02/01/2019     02/01/2019       Lab Results   Component Value Date    [id number] 796 10/02/2018     No results found for: HGBA1C  Lab Results   Component Value Date    INR 2.09 (H) 05/10/2019    INR 1.84 (H) 05/03/2019    INR 1.84 (H) 04/26/2019     No results found for: [id number]  Lab Results   Component Value Date    TSH 2.08 08/04/2015           ASSESSMENT/PLAN:    1. Atrial fibrillation: Rate controlled. No chest pain or palpitations. On digoxin, metoprolol, warfarin. Anticoagulation clinic now managing warfarin in house. .   2. HTN: SBP 140s. On metoprolol. Appears stable.   3. S/p pacemaker: Follows with cardiology, [facility]. Recently requested home monitor so not have to go in for as frequent of checks.   4. Pulmonary hypertension, Dyspnea: Only with extreme exertion. No chest pain or recent concerns. Follows with cardiology.   5. Dependent edema: No edema today. On torsemide.  weights-162lbs. Tries to elevate legs in recliner, does not feel gaurav hose are doable for  him with back pain/stenosis.   6. H/o prostate cancer: No recent issues. On flomax. Radiation seeding in 2004. No recurrence. F/u with urology annually.   7. Lumbar stenosis, chronic back pain: Complaining more recently, tylenol prn. No radiation, no numb/tingling. Will start tylenol three times a day and aspercreme two times a day and prn. Will notify if not improved. No bowel or bladder incontinence, no numb/tingling ble.   8. Cognitive impairment, short term memory issues:  on namenda 7mg xr at bedtime and monitor.   9. H/o CVA: No residual deficits but does have issues with memory which may or may not be related. Possibly vascular dementia component. CT head in may of 2018 showed increase in periventricular low attenuation when compared to 8/22/2009 suggesting progress of chronic microvascular ischemic changes. On warfarin. No benefit of statin given age and risk v benefit.   10. Macular degeneration, intraocular hemorrhage: Follows with Dr. Bill Figueroa. On atropine and prednisolone gtts. No injections as of yet.   11. Constipation: On colace prn and taking prune juice. senna daily prn.   12. Vitamin b 12 deficiency: On vitamin b 12 daily.     Labs recently checked in February 2019, b12 level in October 2018.      Electronically signed by: Delmi Regan NP

## 2021-06-19 NOTE — LETTER
Letter by Ana Michel at      Author: Ana Michel Service: -- Author Type: --    Filed:  Encounter Date: 11/5/2019 Status: Signed         Raymond Zheng  [address]  [place] [address]      November 5, 2019      Dear Mr. Zheng,    RE: Remote Results    We are writing to you regarding your recent Remote Pacemaker check from home. Your transmission was received successfully. Battery status is satisfactory at this time.     Your results are showing no significant changes.    Your next device appointment will be a remote check on February 5, 2020; this will occur automatically.    To schedule or reschedule, please call [phone] and press 1.    NOTE: If you would like to do an extra transmission, please call [phone] and press 3 to speak to a nurse BEFORE transmitting. This ensures that the [facility] Clinic staff is aware of the reason you are sending a transmission, and can follow-up with you after it has been reviewed.    We will be checking your implanted device from home (remotely) every three months unless otherwise instructed. We will need to see you in the clinic at least once a year. You may need to be seen in the clinic sooner depending on the results of your check.    Please be aware:    The follow-up schedule is like a Physician prescription.    Your remote monitor is paired to your specific implanted device.      Sincerely,    [facility]

## 2021-06-19 NOTE — LETTER
Letter by Delmi Regan NP at      Author: Delmi Regan NP Service: -- Author Type: --    Filed:  Encounter Date: 3/20/2019 Status: (Other)         Patient: Raymond Zheng   MR Number: [id number]   YOB: 1925   Date of Visit: 3/20/2019                 [facility]    DATE: 3/20/2019    NAME:  Raymond Zheng             :  1925  MRN: [id number]  CODE STATUS:  FULL CODE    VISIT TYPE: Problem Visit (back pain)     FACILITY:  [facility] [[id number]]       CHIEF COMPLAIN/REASON FOR VISIT:    Chief Complaint   Patient presents with   ? Problem Visit     back pain               HISTORY OF PRESENT ILLNESS: Raymond Zheng is a 89 y.o. male who is a resident to [facility]. He has PMH of A fib, CVA, prostate cancer, HTN, knee osteoarthritis, low back pain, lumbar stenosis, pacemaker, vit b12 deficiency, memory issues, mild pulmonary HTN, dependent edema, dyspnea.     Today Mr. Zheng is seen for concerns of worsening back pain. He is seen in his apartment with his wife at his side. He reports that he thinks he takes tylenol at times and that this does help some. His wife reports he takes the regular strength tylenol and is maybe only taking it once a day. He is willing to try the extra strength tylenol and taking this twice a day scheduled. The nurses set up their meds so this would be easiest for him to remember to take them. He says he is still having trouble with constipation even with the miralax. He would be fine with taking 2 senna tabs at night instead of just one. He says otherwise he is doing ok and getting around without difficulty. His wife reports no other recent concerns today. Discussed he still has no edema in legs today and feel he no longer needs the torsemide. We discussed risk v benefit of this medication and that he is elevating his legs most of the day while sitting in recliner.     REVIEW OF SYSTEMS:  PROBLEMS AND REVIEW OF  SYSTEMS:   Review of Systems  Today on ROS:   Currently, no fever, chills, or rigors. Decreased vision and hearing. Denies any chest pain, headaches, palpitations, lightheadedness, dizziness. Appetite is good. Denies any GERD symptoms. Denies any difficulty with swallowing, nausea, or vomiting.  Denies any abdominal pain, diarrhea or constipation. Denies any urinary symptoms. No insomnia.  Positive for forgetfulness, short term memory issues, ambulates with walker, constipation, swelling in legs resolved, shortness of breath with extreme exertion      Allergies   Allergen Reactions   ? Ciprofloxacin    ? Erythromycin Base    ? Oxycodone-Acetaminophen    ? Sulfa (Sulfonamide Antibiotics)      Current Outpatient Medications   Medication Sig   ? acetaminophen (TYLENOL) 500 MG tablet Take 1,000 mg by mouth 2 (two) times a day. And q6h prn         ? atropine 1 % ophthalmic solution 1 drop at bedtime.   ? cyanocobalamin (VITAMIN B-12) 1000 MCG tablet Take 500 mcg by mouth daily.          ? digoxin (LANOXIN) 125 mcg tablet TAKE ONE TABLET BY MOUTH DAILY   ? FOLIC ACID/MV,FE,MIN (CENTRUM ORAL) Take 1 tablet by mouth daily.    ? memantine (NAMENDA XR) 7 mg CSpX Take 7 mg by mouth at bedtime.   ? metoprolol succinate (TOPROL XL) 50 MG 24 hr tablet Take 1 tablet (50 mg total) by mouth daily.   ? nitroglycerin (NITROSTAT) 0.4 MG SL tablet Place 0.4 mg under the tongue as needed for chest pain.   ? polyethylene glycol (MIRALAX) 17 gram packet Take 17 g by mouth daily as needed.   ? prednisoLONE acetate (PRED-FORTE) 1 % ophthalmic suspension 1 drop every 3 (three) hours.   ? senna (SENOKOT) 8.6 mg tablet Take 2 tablets by mouth daily.          ? senna-docusate (SENNOSIDES-DOCUSATE SODIUM) 8.6-50 mg tablet Take 1 tablet by mouth daily.   ? tamsulosin (FLOMAX) 0.4 mg Cp24 Take 1 capsule (0.4 mg total) by mouth daily.   ? warfarin (COUMADIN) 2.5 MG tablet Take 0.5-1 tablets (1.25-2.5 mg total) by mouth daily. Adjust dose per INR  results as directed     Past Medical History:    Past Medical History:   Diagnosis Date   ? Anticoagulation goal of INR 2 to 3    ? Atrial fibrillation (H)    ? 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 CVA (cerebral infarction)    ? DNR (do not resuscitate) 10/18/2016   ? Former smoker    ? HTN (hypertension)    ? Knee osteoarthritis    ? Onychomycosis    ? Pacemaker    ? Prostate cancer (H)     Treated with radiation brachytherapy. Followed by [facility].     ? Vitamin B12 deficiency    ? Vitiligo            PHYSICAL EXAMINATION  There were no vitals filed for this visit.    Today on physical exam:     GENERAL: Awake, Alert, oriented x3, not in any form of acute distress, answers questions appropriately, follows simple commands, conversant  HEENT: Head is normocephalic with normal hair distribution. No evidence of trauma. Ears: No acute purulent discharge. Eyes: Conjunctivae pink with no scleral jaundice. Nose: Normal mucosa and septum. NECK: Supple with no cervical or supraclavicular lymphadenopathy. Trachea is midline. Glasses, hearing aids  CHEST: No tenderness or deformity, no crepitus  LUNG: dim to auscultation with good chest expansion. There are no crackles or wheezes, normal AP diameter. Shortness of breath with extreme exertion  BACK: No kyphosis of the thoracic spine. Symmetric, no curvature, ROM normal, no CVA tenderness, no spinal tenderness; lumbar pain  CVS: irregularly irregular rhythm, systolic murmur,  2+ pulses symmetric in all extremities.  ABDOMEN: Rounded and soft, nontender to palpation, non distended, no masses, no organomegaly, good bowel sounds, no rebound or guarding, no peritoneal signs.   EXTREMITIES: No pedal edema, darin discoloration  SKIN: Warm and dry,   NEUROLOGICAL: The patient is oriented to person, place and time. Forgetful at times, short term memory issues, no numb/tingling ble.            LABS:   No results found for this or any previous visit (from the  past 168 hour(s)).  Results for orders placed or performed during the hospital encounter of 05/07/18   Basic Metabolic Panel   Result Value Ref Range    Sodium 134 (L) 136 - 145 mmol/L    Potassium 4.5 3.5 - 5.0 mmol/L    Chloride 99 98 - 107 mmol/L    CO2 23 22 - 31 mmol/L    Anion Gap, Calculation 12 5 - 18 mmol/L    Glucose 104 70 - 125 mg/dL    Calcium 10.0 8.5 - 10.5 mg/dL    BUN 18 8 - 28 mg/dL    Creatinine 1.12 0.70 - 1.30 mg/dL    GFR MDRD Af Amer >60 >60 mL/min/1.73m2    GFR MDRD Non Af Amer >60 >60 mL/min/1.73m2         Lab Results   Component Value Date    WBC 7.5 02/01/2019    HGB 13.1 (L) 02/01/2019    HCT 38.9 (L) 02/01/2019    MCV 95 02/01/2019     02/01/2019       Lab Results   Component Value Date    [id number] 796 10/02/2018     No results found for: HGBA1C  Lab Results   Component Value Date    INR 2.46 (H) 03/01/2019    INR 2.70 (H) 02/01/2019    INR 2.30 (H) 01/09/2019     No results found for: [id number]  Lab Results   Component Value Date    TSH 2.08 08/04/2015           ASSESSMENT/PLAN:    1. Atrial fibrillation: Rate controlled in 70s. No chest pain or palpitations. On digoxin, metoprolol, warfarin. Anticoagulation clinic now managing warfarin in house. INR in 2 weeks.   2. HTN: SBP 140s. On metoprolol. Appears stable.   3. S/p pacemaker: Follows with cardiology, [employer]. No concerns.   4. Pulmonary hypertension, Dyspnea: Only with extreme exertion. No chest pain or recent concerns. Follows with cardiology.   5. Dependent edema: No edema again today. Will dc torsemide after risk v benefit discussion. Weight 162lbs and stable.   6. H/o prostate cancer: No recent issues. On flomax. Radiation seeding in 2004. No recurrence. F/u with urology annually.   7. Lumbar stenosis, chronic back pain:  No bowel or bladder incontinence, no numb/tingling ble. Worsening pain recently, only taking tylenol regular strength intermittently. Will schedule 1000mg two times a day due to  increased pain.   8. Cognitive impairment, short term memory issues: On namenda 7mg xr at bedtime and monitor.   9. H/o CVA: No residual deficits but does have issues with memory which may or may not be related. Possibly vascular dementia component. CT head in may of 2018 showed increase in periventricular low attenuation when compared to 8/22/2009 suggesting progress of chronic microvascular ischemic changes. On warfarin. No benefit of statin given age and risk v benefit.   10. Macular degeneration, intraocular hemorrhage: Follows with Dr. Bill Figueroa. On atropine and prednisolone gtts. No injections as of yet.   11. Constipation: On senna daily, miralax daily prn. Not much improvement so will increase senna to 2 tabs at bedtime.   12. Vitamin b 12 deficiency: On vitamin b 12 daily.     Labs recently checked in February 2019, b12 level in October 2018.     Electronically signed by: Delmi Regan NP

## 2021-06-19 NOTE — LETTER
Letter by Lily Jung RDCS at      Author: Lily Jung RDCS Service: -- Author Type: --    Filed:  Encounter Date: 7/9/2019 Status: (Other)         Raymond Zheng c/o Nursing  [address]  [place] [address]      July 9, 2019      Dear Mr. Zheng,    RE: Remote Results    We are writing to you regarding your recent Remote Pacemaker check from home. Your transmission was received successfully. Battery status is satisfactory at this time.     Your results are showing no significant changes.    Your next pacemaker is due in August, 2019. A representative from [employer] will contact the care center staff to arrange an appointment with you there.     To schedule or reschedule, please call [phone] and press 1.    NOTE: If you would like to do an extra transmission, please call [phone] and press 3 to speak to a nurse BEFORE transmitting. This ensures that the Device Clinic staff is aware of the reason you are sending a transmission, and can follow-up with you after it has been reviewed.    We will be checking your implanted device from home (remotely) every three months unless otherwise instructed. We will need to see you in the clinic at least once a year. You may need to be seen in the clinic sooner depending on the results of your check.    Please be aware:    The follow-up schedule is like a Physician prescription.    Your remote monitor is paired to your specific implanted device.      Sincerely,    [facility]

## 2021-06-19 NOTE — LETTER
Letter by Delmi Regan NP at      Author: Delmi Regan NP Service: -- Author Type: --    Filed:  Encounter Date: 2019 Status: Signed         Patient: Raymond Zheng   MR Number: [id number]   YOB: 1925   Date of Visit: 2019                 [facility]    DATE: 2019    NAME:  Raymond Zheng             :  1925  MRN: [id number]  CODE STATUS:  POLST on file    VISIT TYPE: Review Of Multiple Medical Conditions     FACILITY:  [facility] [[id number]]       CHIEF COMPLAIN/REASON FOR VISIT:    Chief Complaint   Patient presents with   ? Review Of Multiple Medical Conditions               HISTORY OF PRESENT ILLNESS: Raymond Zheng is a 89 y.o. male who is a resident of [facility]. He has PMH of A fib, CVA, prostate cancer, HTN, knee osteoarthritis, low back pain, lumbar stenosis, pacemaker, vit b12 deficiency, memory issues, mild pulmonary HTN, dependent edema, dyspnea.     Today Mr. Zheng is seen for review of systems. He is seen in apartment with wife present. He says he has been doing very well recently. He has not had any recent illnesses and seems to sleep pretty well. He says his appetite is good and no heartburn or stomach upset. He denies any back or leg pain recently. He says this has been doing much better lately. He doesn't think he has been taking anything for pain. He does not recall taking tylenol. He denies any recent fevers, chills. He is not having any urinary trouble. He says his memory seems fine and doesn't think it has gotten any worse recently. His wife says he has been pretty steady recently. She continues to set up his meds for him. He denies any recent headaches or dizziness and says his only problem is being old and if there was a pill for this he would be happy to take it. He says he used to ice skate and roller skate but he cannot do that anymore. He does use his walker all the times and no recent  falls. He thinks his weights have been ok and his wife says he is staying right around of 148lbs. He denies any numbness or tingling in legs. He says he has no concerns about his meds. His wife says he has some lab work on this coming Monday and then the following Monday he has a routine follow up with his urologist. He has a history of prostate cancer so this is just his annual follow up. His wife says she has not been giving him any tylenol because he has not had ay issues with pain recently. His blood pressure is noted to be high in 170s today. Discussed increasing his metoprolol dose. He and his wife have no other concerns today. He had a device check on 11/4 that was stable. He last had inr on 11/15 and recheck was for 4 weeks. He had labs in may that were stable. We discussed rechecking at this time but will wait until next visit since he is having some labs on Monday per urology. His wife says she will request they send records to [facility] for his chart. He and his wife say they do not have any family around here so they will not be doing anything or travelling for the holidays.     REVIEW OF SYSTEMS:  PROBLEMS AND REVIEW OF SYSTEMS:   Review of Systems  Today on ROS:   Currently, no fever, chills, or rigors. Decreased vision and hearing. Denies any chest pain, headaches, palpitations, lightheadedness, dizziness. Appetite is good. Denies any GERD symptoms. Denies any difficulty with swallowing, nausea, or vomiting.  Denies any abdominal pain, diarrhea or constipation. Denies any urinary symptoms. No insomnia.  Positive for forgetfulness, short term memory issues stable, ambulates with walker, no shortness of breath, no recent back pain, wife manages meds, dining small for meals, elevated blood pressure      Allergies   Allergen Reactions   ? Ciprofloxacin    ? Erythromycin Base    ? Oxycodone-Acetaminophen    ? Sulfa (Sulfonamide Antibiotics)      Current Outpatient Medications   Medication Sig   ?  acetaminophen (TYLENOL) 500 MG tablet Take 1,000 mg by mouth 3 (three) times a day as needed for pain.    ? atropine 1 % ophthalmic solution 1 drop at bedtime.   ? cyanocobalamin (VITAMIN B-12) 1000 MCG tablet Take 500 mcg by mouth daily.          ? FOLIC ACID/MV,FE,MIN (CENTRUM ORAL) Take 1 tablet by mouth daily.    ? memantine (NAMENDA XR) 7 mg CSpX Take 7 mg by mouth at bedtime.   ? metoprolol succinate (TOPROL XL) 50 MG 24 hr tablet Take 1 tablet (50 mg total) by mouth daily. (Patient taking differently: Take 75 mg by mouth daily. )   ? nitroglycerin (NITROSTAT) 0.4 MG SL tablet Place 0.4 mg under the tongue as needed for chest pain.   ? polyethylene glycol (MIRALAX) 17 gram packet Take 17 g by mouth daily as needed.   ? prednisoLONE acetate (PRED-FORTE) 1 % ophthalmic suspension 1 drop every 3 (three) hours.   ? senna (SENOKOT) 8.6 mg tablet Take 2 tablets by mouth daily.          ? tamsulosin (FLOMAX) 0.4 mg Cp24 Take 1 capsule (0.4 mg total) by mouth daily.   ? trolamine salicylate (ASPERCREME) 10 % cream Apply 1 application topically every 6 (six) hours as needed.          ? warfarin (COUMADIN) 2.5 MG tablet Take 0.5-1 tablets (1.25-2.5 mg total) by mouth daily. Adjust dose per INR results as directed     Past Medical History:    Past Medical History:   Diagnosis Date   ? Anticoagulation goal of INR 2 to 3    ? Atrial fibrillation (H)    ? 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 CVA (cerebral infarction)    ? DNR (do not resuscitate) 10/18/2016   ? Former smoker    ? HTN (hypertension)    ? Knee osteoarthritis    ? Onychomycosis    ? Pacemaker    ? Prostate cancer (H)     Treated with radiation brachytherapy. Followed by [facility].     ? Vitamin B12 deficiency    ? Vitiligo            PHYSICAL EXAMINATION  Vitals:    12/04/19 0700   BP: 175/70   Pulse: 70   Resp: 20   Temp: 97.5  F (36.4  C)   SpO2: 99%       Today on physical exam:     GENERAL: Awake, Alert, oriented x3, not in any  form of acute distress, answers questions appropriately, follows simple commands, conversant  HEENT: Head is normocephalic with normal hair distribution. No evidence of trauma. Ears: No acute purulent discharge. Eyes: Conjunctivae pink with no scleral jaundice. Nose: Normal mucosa and septum. NECK: Supple with no cervical or supraclavicular lymphadenopathy. Trachea is midline. Glasses, hearing aids, [place]  CHEST: No tenderness or deformity, no crepitus  LUNG: dim to auscultation with good chest expansion. There are no crackles or wheezes, normal AP diameter. No shortness of breath no cough  BACK: chronic lumbar pain, localized no radiation, no numb/tingling  CVS: irregularly irregular rhythm, systolic murmur,  2+ pulses symmetric in all extremities.  ABDOMEN: Rounded and soft, nontender to palpation, non distended, no masses, no organomegaly, good bowel sounds, no rebound or guarding, no peritoneal signs.   EXTREMITIES: No pedal edema, darin discoloration ble  SKIN: Warm and dry,   NEUROLOGICAL: The patient is oriented to person, place and time. Forgetful at times, short term memory issues, no numb/tingling ble.            LABS:   No results found for this or any previous visit (from the past 168 hour(s)).  Results for orders placed or performed during the hospital encounter of 05/07/18   Basic Metabolic Panel   Result Value Ref Range    Sodium 134 (L) 136 - 145 mmol/L    Potassium 4.5 3.5 - 5.0 mmol/L    Chloride 99 98 - 107 mmol/L    CO2 23 22 - 31 mmol/L    Anion Gap, Calculation 12 5 - 18 mmol/L    Glucose 104 70 - 125 mg/dL    Calcium 10.0 8.5 - 10.5 mg/dL    BUN 18 8 - 28 mg/dL    Creatinine 1.12 0.70 - 1.30 mg/dL    GFR MDRD Af Amer >60 >60 mL/min/1.73m2    GFR MDRD Non Af Amer >60 >60 mL/min/1.73m2         Lab Results   Component Value Date    WBC 8.6 05/29/2019    HGB 11.2 (L) 05/29/2019    HCT 35.3 (L) 05/29/2019     05/29/2019     05/29/2019       Lab Results   Component Value Date     [id number] 796 10/02/2018     No results found for: HGBA1C  Lab Results   Component Value Date    INR 2.79 (H) 11/15/2019    INR 2.64 (H) 10/18/2019    INR 2.78 (H) 09/20/2019     No results found for: [id number]  Lab Results   Component Value Date    TSH 2.08 08/04/2015           ASSESSMENT/PLAN:    1. Atrial fibrillation: Rate controlled 70s. No chest pain or palpitations. On metoprolol, warfarin. Anticoagulation clinic now managing warfarin in house. Stable recently. Last INR 2.79 recheck in 4 weeks. Stable on current dosing.   2. HTN: SBP 170s. On metoprolol. Elevated recently, will increase metoprolol dose to 75mg daily.   3. S/p pacemaker: Follows with cardiology, [facility]. 11/4 remote device check, stable.   4. Pulmonary hypertension, Dyspnea: No recent shortness of breath. No chest pain or recent concerns. Follows with cardiology prn.   5. Cognitive impairment, vascular dementia:  on namenda 7mg xr. Stable recently. No further progression last few months, appears stable.   6. H/o prostate cancer: No recent issues. On flomax. Radiation seeding in 2004. No recurrence. F/u with urology-labs on 12/9 and appt on 12/16.   7. Lumbar stenosis, chronic back pain: No radiation, no numb/tingling. Per wife and pt no recent tylenol use. Changed on med list to prn as already doing this. aspercreme prn. Sees chiropractor regularly in house. Doing well lately. Ambulates with walker.   8. Macular degeneration, intraocular hemorrhage: Follows with Dr. Bill Figueroa. On atropine and prednisolone gtts. appt next week.   9. H/o CVA: On warfarin, no recent concerns.   10. Vitamin b 12 deficiency: On vitamin b 12 daily.  11. Constipation: On colace prn and taking prune juice. senna daily prn. No recent issues.     Labs in May 2019, consider rechecking at next visit. Has labs on 12/9, will see what is resulted from this first.     Electronically signed by: Delmi Regan NP

## 2021-06-20 NOTE — LETTER
Letter by Keiry Ann RDCS at      Author: Keiry Ann RDCS Service: -- Author Type: --    Filed:  Encounter Date: 5/18/2020 Status: (Other)         Raymond Zheng  [address]  [place] [address]      May 18, 2020      Dear Mr. Zheng,    RE: Remote Results    We are writing to you regarding your recent Remote Pacemaker check from home. Your transmission was received successfully. Battery status is satisfactory at this time.     Your results are showing no significant changes.    Your next device appointment will be a remote check on August 19th, 2020; this will occur automatically.    To schedule or reschedule, please call [phone] and press 1.    NOTE: If you would like to do an extra transmission, please call [phone] and press 3 to speak to a nurse BEFORE transmitting. This ensures that the Device Clinic staff is aware of the reason you are sending a transmission, and can follow-up with you after it has been reviewed.    We will be checking your implanted device from home (remotely) every three months unless otherwise instructed. We will need to see you in the clinic at least once a year. You may need to be seen in the clinic sooner depending on the results of your check.    Please be aware:    The follow-up schedule is like a Physician prescription.    Your remote monitor is paired to your specific implanted device.      Sincerely,    [facility]

## 2021-06-20 NOTE — LETTER
Letter by Delmi Regan NP at      Author: Delmi Regan NP Service: -- Author Type: --    Filed:  Encounter Date: 2020 Status: (Other)         Patient: Raymond Zheng   MR Number: [id number]   YOB: 1925   Date of Visit: 2020                 [facility]    DATE: 2020    NAME:  Raymond Zheng             :  1925  MRN: [id number]  CODE STATUS:  POLST on file    VISIT TYPE: Problem Visit (edema, sob)     FACILITY:  [facility] [[id number]]       CHIEF COMPLAIN/REASON FOR VISIT:    Chief Complaint   Patient presents with   ? Problem Visit     edema, sob               HISTORY OF PRESENT ILLNESS: Raymond Zheng is a 89 y.o. male who is a resident of [facility]. He has PMH of A fib, CVA, prostate cancer, HTN, knee osteoarthritis, low back pain, lumbar stenosis, pacemaker, vit b12 deficiency, memory issues, mild pulmonary HTN, dependent edema, dyspnea.     Today Mr. Zheng is seen today for review of systems but turned into problem visit due to concerns for edema and shortness of breath. Yesterday he was walking with staff and therapy and was noted to have to stop and catch his breath every few minutes. He has not been complaining of shortness of breath or cough but is noted to be winded with minimal exertion. On exam he is seen in his apartment after just coming back from the bathroom and he had to stop half way to his chair to catch his breath. He is more hunched over his walker today than usual as well. He appears fatigued and unkempt today, unusual appearance for him. He is pleasantly confused and says  He is fine and denies any issues. However on exam I noted 3+ ble pitting edema and he does say  His legs had been swollen. He is not sure when this started. He denies dizziness, fevers, cough, sore throat, shortness of breath, bowel concerns, or other issues. He says he sleeps fine on one pillow. He denies any pain today and says  his legs are not aching. He is obviously short of breath during conversation even.  He is agreeable to trying to elevate his legs, wear compression stockings. We discussed adding services on so staff can monitor his weights and he is fine with that. He says  He is willing to take water pills as well.     REVIEW OF SYSTEMS:  PROBLEMS AND REVIEW OF SYSTEMS:   Review of Systems  Today on ROS:   Currently, no fever, chills, or rigors. Decreased vision and hearing. Denies any chest pain, headaches, palpitations, lightheadedness, dizziness. Appetite is good. Denies any GERD symptoms. Denies any difficulty with swallowing, nausea, or vomiting.  Denies any abdominal pain, diarrhea or constipation. Denies any urinary symptoms. No insomnia.  Positive for forgetfulness, ambulates with walker, no shortness of breath, no recent back pain, nursing manages and administers meds, leg swelling, shortness of breath, confusion      Allergies   Allergen Reactions   ? Ciprofloxacin    ? Erythromycin Base    ? Oxycodone-Acetaminophen    ? Sulfa (Sulfonamide Antibiotics)      Current Outpatient Medications   Medication Sig   ? furosemide (LASIX) 20 MG tablet Take 20 mg by mouth daily.   ? potassium chloride (KLOR-CON) 10 MEQ CR tablet Take 10 mEq by mouth daily.   ? acetaminophen (TYLENOL) 500 MG tablet Take 1,000 mg by mouth 3 (three) times a day as needed for pain.    ? memantine (NAMENDA XR) 7 mg CSpX Take 7 mg by mouth at bedtime.   ? metoprolol succinate (TOPROL XL) 50 MG 24 hr tablet Take 1.5 tablets (75 mg total) by mouth daily.   ? nitroglycerin (NITROSTAT) 0.4 MG SL tablet Place 0.4 mg under the tongue as needed for chest pain.   ? polyethylene glycol (MIRALAX) 17 gram packet Take 17 g by mouth daily as needed.   ? SENEXON-S 8.6-50 mg tablet 2 TABS BY MOUTH EVERY BEDTIME   ? senna (SENOKOT) 8.6 mg tablet Take 2 tablets by mouth at bedtime.    ? trolamine salicylate (ASPERCREME) 10 % cream Apply 1 application topically every 6  (six) hours as needed.          ? warfarin (COUMADIN) 2.5 MG tablet Take 0.5-1 tablets (1.25-2.5 mg total) by mouth daily. Adjust dose per INR results as directed     Past Medical History:    Past Medical History:   Diagnosis Date   ? Anticoagulation goal of INR 2 to 3    ? Atrial fibrillation (H)    ? 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 CVA (cerebral infarction)    ? DNR (do not resuscitate) 10/18/2016   ? Former smoker    ? HTN (hypertension)    ? Knee osteoarthritis    ? Onychomycosis    ? Pacemaker    ? Prostate cancer (H)     Treated with radiation brachytherapy. Followed by [facility].     ? Vitamin B12 deficiency    ? Vitiligo            PHYSICAL EXAMINATION  Vitals:    05/20/20 0700   BP: 140/80   Pulse: 78   Resp: 16   Temp: 96.7  F (35.9  C)   SpO2: 96%   Weight: 171 lb (77.6 kg)       Today on physical exam:     GENERAL: Awake, Alert, not in any form of acute distress, answers questions appropriately, follows simple commands, conversant, unkempt today  HEENT: Head is normocephalic with normal hair distribution. No evidence of trauma. Ears: No acute purulent discharge. Eyes: Conjunctivae pink with no scleral jaundice. Nose: Normal mucosa and septum. NECK: Supple with no cervical or supraclavicular lymphadenopathy. Trachea is midline. Glasses, hearing aids, [place]  CHEST: No tenderness or deformity, no crepitus  LUNG: dim with crackles to bilateral bases to auscultation with good chest expansion. There are no crackles or wheezes, normal AP diameter. No cough noted, shortness of breath with minimal exertion and talking  BACK: chronic lumbar pain but denies today, no radiation, no numb/tingling, ,moderate kyphosis  CVS: irregularly irregular rhythm, systolic murmur,  2+ pulses symmetric in all extremities.  ABDOMEN: Rounded and soft, nontender to palpation, non distended, no masses, no organomegaly, good bowel sounds, no rebound or guarding, no peritoneal signs.   EXTREMITIES: darin  discoloration ble, 3+ ble pitting edema  SKIN: Warm and dry,   NEUROLOGICAL: The patient is oriented to person, place and time. Confused at times Forgetful at times, no numb/tingling ble. Very pleasant, mild flattened affect than baseline, mild depressive symptoms            LABS:   Recent Results (from the past 168 hour(s))   Remote Device Check   Result Value Ref Range    Device Manufacture Path.To     Device Model K062 ADVANTIO     Device Serial Number [id number]     Device Type Pacemaker     Device Implanting Provider PRASHANTH Mayorga     Comments/Summary       Type: routine pacemaker check   Presenting Rhythm: VS, rates: 70bpm  Lead/Battery status: stable  Arrhythmias: since 2/17/20; 7 ventricular high rate episodes, two available EGMs show probable RVR(RV lead only), v-rates >/=120bpm ~8%.  Anticoagulant: warfarin  Comments: normal device function. jtr     INR   Result Value Ref Range    INR 3.38 (H) 0.90 - 1.10     Results for orders placed or performed during the hospital encounter of 05/07/18   Basic Metabolic Panel   Result Value Ref Range    Sodium 134 (L) 136 - 145 mmol/L    Potassium 4.5 3.5 - 5.0 mmol/L    Chloride 99 98 - 107 mmol/L    CO2 23 22 - 31 mmol/L    Anion Gap, Calculation 12 5 - 18 mmol/L    Glucose 104 70 - 125 mg/dL    Calcium 10.0 8.5 - 10.5 mg/dL    BUN 18 8 - 28 mg/dL    Creatinine 1.12 0.70 - 1.30 mg/dL    GFR MDRD Af Amer >60 >60 mL/min/1.73m2    GFR MDRD Non Af Amer >60 >60 mL/min/1.73m2         Lab Results   Component Value Date    WBC 8.6 05/29/2019    HGB 11.2 (L) 05/29/2019    HCT 35.3 (L) 05/29/2019     05/29/2019     05/29/2019       Lab Results   Component Value Date    [id number] 796 10/02/2018     No results found for: HGBA1C  Lab Results   Component Value Date    INR 3.38 (H) 05/20/2020    INR 2.33 (H) 04/22/2020    INR 2.61 (H) 03/25/2020     No results found for: [id number]  Lab Results   Component Value Date    TSH 2.08 08/04/2015            ASSESSMENT/PLAN:    Acute CHF, fluid overload: 3+ ble pitting edema, h/o pulmonary htn, intermittent dyspnea. shortness of breath with minimal activity and with speaking today. Weight today 171lbs and last weight recorded 2 months ago was 159lbs. Significant weight gain for him. Concerns for being poor historian and did not alert staff to symptoms. Even denies symptoms today. Requested staff weigh him at least weekly  And notify me if weight change >5lbs. Start lasix 40mg x 5 days then 20mg daily, kcl 10meq daily, bmp on 5/22. Elevate legs, gaurav hose or tubigrips. Monitor closely. o2 sat stable today.   Atrial fibrillation: Rate controlled 70s. No chest pain or palpitations. On metoprolol, warfarin. Anticoagulation clinic manages warfarin. No recent concerns.   HTN: SBP 140s. On metoprolol. Stable since recent increase in metoprolol.  S/p pacemaker: Follows with cardiology, [facility]. No recent concerns.   Cognitive impairment, vascular dementia:  on namenda 7mg xr. Very slow progressive decline, concern for further decline now that his wife has passed. Monitor closely and provide safe environment. No recent safety concerns or behavioral issues. Very poor historian and unable to identify or alert staff to symptoms of fluid overload, need to be monitored closely.   H/o prostate cancer: No recent issues. On flomax. Radiation seeding in 2004. Last follow up in December. No concerns today. Dc flomax risk v benefit. Med reductions.  Lumbar stenosis, chronic back pain: No radiation, no numb/tingling. Tylenol prn, aspercreme prn. Sees chiropractor regularly in facility. No recent pain or concerns.   Macular degeneration, intraocular hemorrhage: Follows with Dr. Bill Figueroa. On atropine and prednisolone gtts. No concerns today.   H/o CVA: On warfarin, no recent concerns.   Constipation: On colace prn and taking prune juice. senna daily prn. Stable recently.   Weights: 159lbs-171lbs.        Electronically  signed by: Delmi Regan NP

## 2021-06-20 NOTE — LETTER
Letter by Ana Michel at      Author: Ana Michel Service: -- Author Type: --    Filed:  Encounter Date: 2/17/2020 Status: (Other)         Raymond Zheng  [address]  [place] [address]      February 17, 2020      Dear Mr. Zheng,    RE: Remote Results    We are writing to you regarding your recent Remote Pacemaker check from home. Your transmission was received successfully. Battery status is satisfactory at this time.     Your results are showing no significant changes.    Your next device appointment will be a remote check on May 18, 2020; this will occur automatically.    To schedule or reschedule, please call [phone] and press 1.    NOTE: If you would like to do an extra transmission, please call [phone] and press 3 to speak to a nurse BEFORE transmitting. This ensures that the [facility] staff is aware of the reason you are sending a transmission, and can follow-up with you after it has been reviewed.    We will be checking your implanted device from home (remotely) every three months unless otherwise instructed. We will need to see you in the clinic at least once a year. You may need to be seen in the clinic sooner depending on the results of your check.    Please be aware:    The follow-up schedule is like a Physician prescription.    Your remote monitor is paired to your specific implanted device.      Sincerely,    [facility]

## 2021-06-20 NOTE — LETTER
Letter by Delmi Regan NP at      Author: Delmi Regan NP Service: -- Author Type: --    Filed:  Encounter Date: 2020 Status: (Other)         Patient: Raymond Zheng   MR Number: [id number]   YOB: 1925   Date of Visit: 2020                 [facility]    DATE: 2020    NAME:  Raymond Zheng             :  1925  MRN: [id number]  CODE STATUS:  POLST on file    VISIT TYPE: Problem Visit (crackles, edema)     FACILITY:  [facility] [place] [[id number]]       CHIEF COMPLAIN/REASON FOR VISIT:    Chief Complaint   Patient presents with   ? Problem Visit     crackles, edema               HISTORY OF PRESENT ILLNESS: Raymond Zheng is a 89 y.o. male who is a resident of [facility]. He has PMH of A fib, CVA, prostate cancer, HTN, knee osteoarthritis, low back pain, lumbar stenosis, pacemaker, vit b12 deficiency, memory issues, mild pulmonary HTN, dependent edema, dyspnea.     Today Mr. Zheng is seen for concerns of crackles and edema. He is seen in his apartment alone today. He says that he has been feeling fine. His legs are still swollen and he is not sure if they have gotten worse or better recently. His breathing seems fine and he denies feeling short of breath. He does cough sometimes but says it sounds like a smokers cough. He doesn't think he usually brings anything up. He is not having any fevers, sore, throat, bowel concerns. He does urinate frequently but no other urinary issues. He says he sits in his recliner most of the day and he says he tries to elevate his legs but is not wearing stockings. He says he cannot put these on by himself. He denies dizziness or other problems. He thinks they are weighing him but not sure. On review of his chart in room he had one weight on  but no others this month and this was 142lbs, which does not correlate with previous weights of 171lbs at last visit. He is noted to have pitting  edema and crackles on exam. We reviewed that he needs to be weighed weekly and he can help remind staff of this. We discussed he is fluid overloaded and he did agree to changing his medications to help with this.     REVIEW OF SYSTEMS:  PROBLEMS AND REVIEW OF SYSTEMS:   Review of Systems  Today on ROS:   Currently, no fever, chills, or rigors. Decreased vision and hearing. Denies any chest pain, headaches, palpitations, lightheadedness, dizziness. Appetite is good. Denies any GERD symptoms. Denies any difficulty with swallowing, nausea, or vomiting.  Denies any abdominal pain, diarrhea or constipation. Denies any urinary symptoms. No insomnia.  Positive for forgetfulness, ambulates with walker,  no recent back pain, nursing manages and administers meds, leg swelling, confusion, denies shortness of breath but appears shortness of breath when ambulating      Allergies   Allergen Reactions   ? Ciprofloxacin    ? Erythromycin Base    ? Oxycodone-Acetaminophen    ? Sulfa (Sulfonamide Antibiotics)      Current Outpatient Medications   Medication Sig   ? acetaminophen (TYLENOL) 500 MG tablet Take 1,000 mg by mouth 3 (three) times a day as needed for pain.    ? furosemide (LASIX) 20 MG tablet Take 20 mg by mouth daily.    ? memantine (NAMENDA XR) 7 mg CSpX 1 CAP BY MOUTH EVERY BEDTIME (DX: MEMORY LOSS)   ? metoprolol succinate (TOPROL XL) 50 MG 24 hr tablet Take 1.5 tablets (75 mg total) by mouth daily.   ? nitroglycerin (NITROSTAT) 0.4 MG SL tablet Place 0.4 mg under the tongue as needed for chest pain.   ? polyethylene glycol (MIRALAX) 17 gram packet Take 17 g by mouth daily as needed.   ? potassium chloride (KLOR-CON) 10 MEQ CR tablet Take 10 mEq by mouth daily.    ? SENEXON-S 8.6-50 mg tablet 2 TABS BY MOUTH EVERY BEDTIME   ? senna (SENOKOT) 8.6 mg tablet Take 2 tablets by mouth at bedtime.    ? trolamine salicylate (ASPERCREME) 10 % cream Apply 1 application topically every 6 (six) hours as needed.          ? warfarin  (COUMADIN) 2.5 MG tablet Take 0.5-1 tablets (1.25-2.5 mg total) by mouth daily. Adjust dose per INR results as directed     Past Medical History:    Past Medical History:   Diagnosis Date   ? Anticoagulation goal of INR 2 to 3    ? Atrial fibrillation (H)    ? 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 CVA (cerebral infarction)    ? DNR (do not resuscitate) 10/18/2016   ? Former smoker    ? HTN (hypertension)    ? Knee osteoarthritis    ? Onychomycosis    ? Pacemaker    ? Prostate cancer (H)     Treated with radiation brachytherapy. Followed by [facility].     ? Vitamin B12 deficiency    ? Vitiligo            PHYSICAL EXAMINATION  There were no vitals filed for this visit.    Today on physical exam:     GENERAL: Awake, Alert, not in any form of acute distress, answers questions appropriately, follows simple commands, conversant, pleasant but less  than usual  HEENT: Head is normocephalic with normal hair distribution. No evidence of trauma. Ears: No acute purulent discharge. Eyes: Conjunctivae pink with no scleral jaundice. Nose: Normal mucosa and septum. NECK: Supple with no cervical or supraclavicular lymphadenopathy. Trachea is midline. Glasses, hearing aids, [employer]  CHEST: No tenderness or deformity, no crepitus  LUNG: dim with crackles to bilateral bases to auscultation with good chest expansion. There are no crackles or wheezes, normal AP diameter. No cough noted, shortness of breath not noted at rest but noted with exertion  BACK: chronic lumbar pain but denies today, no radiation, no numb/tingling, ,moderate kyphosis  CVS: irregularly irregular rhythm, systolic murmur,  2+ pulses symmetric in all extremities.  ABDOMEN: Rounded and soft, nontender to palpation, non distended, no masses, no organomegaly, good bowel sounds, no rebound or guarding, no peritoneal signs.   EXTREMITIES: darin discoloration ble, 2+ ble pitting edema  SKIN: Warm and dry,   NEUROLOGICAL: The patient is oriented  to person, place and time. Confused at times Forgetful at times, no numb/tingling ble.  More flat affect than baseline            LABS:   Recent Results (from the past 168 hour(s))   INR   Result Value Ref Range    INR 2.38 (H) 0.90 - 1.10     Results for orders placed or performed in visit on 06/10/20   Basic Metabolic Panel   Result Value Ref Range    Sodium 143 136 - 145 mmol/L    Potassium 3.7 3.5 - 5.0 mmol/L    Chloride 108 (H) 98 - 107 mmol/L    CO2 25 22 - 31 mmol/L    Anion Gap, Calculation 10 5 - 18 mmol/L    Glucose 78 70 - 125 mg/dL    Calcium 9.3 8.5 - 10.5 mg/dL    BUN 27 8 - 28 mg/dL    Creatinine 1.23 0.70 - 1.30 mg/dL    GFR MDRD Af Amer >60 >60 mL/min/1.73m2    GFR MDRD Non Af Amer 55 (L) >60 mL/min/1.73m2         Lab Results   Component Value Date    WBC 8.6 05/29/2019    HGB 11.2 (L) 05/29/2019    HCT 35.3 (L) 05/29/2019     05/29/2019     05/29/2019       Lab Results   Component Value Date    [id number] 796 10/02/2018     No results found for: HGBA1C  Lab Results   Component Value Date    INR 2.38 (H) 06/17/2020    INR 2.20 (H) 06/04/2020    INR 3.38 (H) 05/20/2020     No results found for: [id number]  Lab Results   Component Value Date    TSH 2.08 08/04/2015           ASSESSMENT/PLAN:    Acute CHF, fluid overload: 2+ ble pitting edema, h/o pulmonary htn, intermittent dyspnea. shortness of breath with exertion, crackles. Weekly weights ordered but most recent weight does not correlate. Last weight was 171lbs at last visit. Discussed with him and staff importance of weighing at least once weekly. Edema remains much worse than baseline, change lasix to 20mg daily, kcl daily (previously every other day). Bmp on 6/24 as previously had SARITA with lasix. Will gently diurese. Continue to encourage stockings, although has been refusing. Elevate legs.   Atrial fibrillation:. No chest pain or palpitations. On metoprolol, warfarin. Anticoagulation clinic manages warfarin. stable  HTN: SBP. On  metoprolol. Stable since recent increase in metoprolol.  S/p pacemaker: Follows with cardiology, [facility]. No recent concerns.   Cognitive impairment, vascular dementia:  on namenda 7mg xr. Slow progressive decline, more flat affect and less  today than usual.   H/o prostate cancer: No recent issues. On flomax. Radiation seeding in 2004. Last follow up in December. No concerns today. Dc flomax risk v benefit. Med reductions.  Lumbar stenosis, chronic back pain: No radiation, no numb/tingling. Tylenol prn, aspercreme prn. Sees chiropractor regularly in facility. No recent pain or concerns.   Macular degeneration, intraocular hemorrhage: Follows with Dr. Bill Figueroa. On atropine and prednisolone gtts. No concerns today.   H/o CVA: On warfarin, no recent concerns.   Constipation: On colace prn and taking prune juice. senna daily prn. Stable recently.   Weights: 159lbs-171lbs.  Needs to be weighed weekly.       Electronically signed by: Delmi Regan NP

## 2021-06-20 NOTE — LETTER
Letter by Delmi Regan NP at      Author: Delmi Regan NP Service: -- Author Type: --    Filed:  Encounter Date: 2020 Status: (Other)         Patient: Raymond Zheng   MR Number: [id number]   YOB: 1925   Date of Visit: 2020                 [facility]    DATE: 2020    NAME:  Raymond Zheng             :  1925  MRN: [id number]  CODE STATUS:  POLST on file    VISIT TYPE: Problem Visit (eye drainage)     FACILITY:  [facility] [[id number]]       CHIEF COMPLAIN/REASON FOR VISIT:    Chief Complaint   Patient presents with   ? Problem Visit     eye drainage               HISTORY OF PRESENT ILLNESS: Raymond Zheng is a 89 y.o. male who is a resident of [facility]. He has PMH of A fib, CVA, prostate cancer, HTN, knee osteoarthritis, low back pain, lumbar stenosis, pacemaker, vit b12 deficiency, memory issues, mild pulmonary HTN, dependent edema, dyspnea.     Today Mr. Zheng is seen for concerns of eye drainage and redness. The  reported he had large amount of purulent drainage from both of his eyes this morning. He is seen in his apartment today alone. He reports he is fine and does not recall having issues with his eyes this morning. He denies any itching, pain, discomfort from his eyes. He is noted to have significant redness and inflammation on his eyelids. His sclera is clear but small amount of purulent drainage noted. Per RA there was a large amount of purulent drainage from eyes this morning.     REVIEW OF SYSTEMS:  PROBLEMS AND REVIEW OF SYSTEMS:   Review of Systems  Today on ROS:   Currently, no fever, chills, or rigors. Decreased vision and hearing. Denies any chest pain, headaches, palpitations, lightheadedness, dizziness. Appetite is good. Denies any GERD symptoms. Denies any difficulty with swallowing, nausea, or vomiting.  Denies any abdominal pain, diarrhea or constipation. Denies any urinary  symptoms. No insomnia.  Positive for forgetfulness, ambulates with walker,  no recent back pain, nursing manages and administers meds, confusion, shortness of breath improved recently, edema improved recently, eye redness and drainage      Allergies   Allergen Reactions   ? Ciprofloxacin    ? Erythromycin Base    ? Oxycodone-Acetaminophen    ? Sulfa (Sulfonamide Antibiotics)      Current Outpatient Medications   Medication Sig   ? acetaminophen (TYLENOL) 500 MG tablet Take 1,000 mg by mouth 3 (three) times a day as needed for pain.    ? furosemide (LASIX) 20 MG tablet Take 40 mg by mouth daily.    ? furosemide (LASIX) 40 MG tablet 1 TAB BY MOUTH DAILY (DX: EDEMA)   ? memantine (NAMENDA XR) 7 mg CSpX 1 CAP BY MOUTH EVERY BEDTIME (DX: MEMORY LOSS)   ? metoprolol succinate (TOPROL XL) 50 MG 24 hr tablet Take 1.5 tablets (75 mg total) by mouth daily.   ? nitroglycerin (NITROSTAT) 0.4 MG SL tablet Place 0.4 mg under the tongue as needed for chest pain.   ? polyethylene glycol (MIRALAX) 17 gram packet Take 17 g by mouth daily as needed.   ? potassium chloride (K-DUR,KLOR-CON) 20 MEQ tablet 1 TAB BY MOUTH DAILY (DX: HYPOKALEMIA)   ? potassium chloride (KLOR-CON) 10 MEQ CR tablet Take 20 mEq by mouth daily.    ? SENEXON-S 8.6-50 mg tablet 2 TABS BY MOUTH EVERY BEDTIME   ? senna (SENOKOT) 8.6 mg tablet Take 2 tablets by mouth at bedtime.    ? trolamine salicylate (ASPERCREME) 10 % cream Apply 1 application topically every 6 (six) hours as needed.          ? warfarin (COUMADIN) 2.5 MG tablet Take 0.5-1 tablets (1.25-2.5 mg total) by mouth daily. Adjust dose per INR results as directed     Past Medical History:    Past Medical History:   Diagnosis Date   ? Anticoagulation goal of INR 2 to 3    ? Atrial fibrillation (H)    ? 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 CVA (cerebral infarction)    ? DNR (do not resuscitate) 10/18/2016   ? Former smoker    ? HTN (hypertension)    ? Knee osteoarthritis    ?  Onychomycosis    ? Pacemaker    ? Prostate cancer (H)     Treated with radiation brachytherapy. Followed by [facility].     ? Vitamin B12 deficiency    ? Vitiligo            PHYSICAL EXAMINATION  There were no vitals filed for this visit.    Today on physical exam:     GENERAL: Awake, Alert, not in any form of acute distress, answers questions appropriately, follows simple commands, conversant, pleasant  HEENT: Head is normocephalic with normal hair distribution. No evidence of trauma. Ears: No acute purulent discharge. Eyes: Conjunctivae pink with no scleral jaundice. Nose: Normal mucosa and septum. NECK: Supple with no cervical or supraclavicular lymphadenopathy. Trachea is midline. Glasses, hearing aids, [place], purulent drainage from eyes noted by staff, erythema, edema on bilateral eye lids  CHEST: No tenderness or deformity, no crepitus  LUNG: dim but clear to bilateral bases to auscultation with good chest expansion. There are no crackles or wheezes, normal AP diameter. No cough noted, no shortness of breath at rest, some with  Ambulation but improved from previous visit  BACK: chronic lumbar pain but denies today, no radiation, no numb/tingling, moderate kyphosis  CVS: irregularly irregular rhythm, systolic murmur,  2+ pulses symmetric in all extremities.  ABDOMEN: Rounded and soft, nontender to palpation, non distended, no masses, no organomegaly, good bowel sounds, no rebound or guarding, no peritoneal signs.   EXTREMITIES: darin discoloration ble, 1-2+ ble pitting edema  SKIN: Warm and dry,   NEUROLOGICAL: The patient is oriented to person, place and time. Confused at times Forgetful at times, no numb/tingling ble.  Pleasantly confused            LABS:   No results found for this or any previous visit (from the past 168 hour(s)).  Results for orders placed or performed in visit on 07/01/20   Basic Metabolic Panel   Result Value Ref Range    Sodium 138 136 - 145 mmol/L    Potassium 4.0 3.5 - 5.0 mmol/L     Chloride 103 98 - 107 mmol/L    CO2 26 22 - 31 mmol/L    Anion Gap, Calculation 9 5 - 18 mmol/L    Glucose 69 (L) 70 - 125 mg/dL    Calcium 9.6 8.5 - 10.5 mg/dL    BUN 35 (H) 8 - 28 mg/dL    Creatinine 1.43 (H) 0.70 - 1.30 mg/dL    GFR MDRD Af Amer 56 (L) >60 mL/min/1.73m2    GFR MDRD Non Af Amer 46 (L) >60 mL/min/1.73m2         Lab Results   Component Value Date    WBC 8.6 05/29/2019    HGB 11.2 (L) 05/29/2019    HCT 35.3 (L) 05/29/2019     05/29/2019     05/29/2019       Lab Results   Component Value Date    [id number] 796 10/02/2018     No results found for: HGBA1C  Lab Results   Component Value Date    INR 2.04 (H) 07/15/2020    INR 2.38 (H) 06/17/2020    INR 2.20 (H) 06/04/2020     No results found for: [id number]  Lab Results   Component Value Date    TSH 2.08 08/04/2015           ASSESSMENT/PLAN:    Bilateral blepharitis v ocular inflammation: will order cipro gtts three times a day x 7 days. Warm compresses q4h prn. Notify if not improving in few days.   Fluid overload, dependent edema, Dyspnea, Acute on chronic CHF: 1-2+ ble pitting edema, h/o pulmonary htn, intermittent dyspnea. shortness of breath with exertion improved recently, weekly [id number],  Completed metolazone burst. Echo was ordered, unclear if resulted yet. On lasix daily. Stable at this time.   Atrial fibrillation:. No chest pain or palpitations. On metoprolol, warfarin. Anticoagulation clinic manages warfarin. Stable recently.   HTN: SBP. On metoprolol. No recent concerns.   S/p pacemaker: Follows with cardiology, [facility]. No recent concerns.   Cognitive impairment, vascular dementia:  on namenda 7mg xr. Slow progressive decline, per staff more confusion noted since wife passed away.   H/o prostate cancer: No recent issues. On flomax. Radiation seeding in 2004. Last follow up in December. No concerns today. Dc flomax risk v benefit. Med reductions.  Lumbar stenosis, chronic back pain: No radiation, no  numb/tingling. Tylenol prn, aspercreme prn. Sees chiropractor regularly in facility. No recent pain or concerns.   Macular degeneration, intraocular hemorrhage: Follows with Dr. Bill Figueroa. On atropine and prednisolone gtts. No concerns today.   H/o CVA: On warfarin, no recent concerns.   Constipation: On colace prn and taking prune juice. senna daily prn. Stable recently.   Weights: [id number] but also has a 142lbs weight documented this month.  Weigh weekly, notify for weight change of 5 lbs.   CKD stage 2: cr 1.24, GFR 50 on 6/24. Previously 1.32, gfr 51.     Electronically signed by: Delmi Regan NP

## 2021-06-20 NOTE — LETTER
Letter by Delmi Regan NP at      Author: Delmi Regan NP Service: -- Author Type: --    Filed:  Encounter Date: 2020 Status: (Other)         Patient: Raymond Zheng   MR Number: [id number]   YOB: 1925   Date of Visit: 2020                 [facility]    DATE: 2020    NAME:  Raymond Zheng             :  1925  MRN: [id number]  CODE STATUS:  POLST on file    VISIT TYPE: Problem Visit (depression)     FACILITY:  [facility] [[id number]]       CHIEF COMPLAIN/REASON FOR VISIT:    Chief Complaint   Patient presents with   ? Problem Visit     depression               HISTORY OF PRESENT ILLNESS: Raymond Zheng is a 89 y.o. male who is a resident of [facility]. He has PMH of A fib, CVA, prostate cancer, HTN, knee osteoarthritis, low back pain, lumbar stenosis, pacemaker, vit b12 deficiency, memory issues, mild pulmonary HTN, dependent edema, dyspnea.     Today Mr. Zheng is seen for concerns of depression and well being check as his wife just passed away this week. She had been rather ill recently and in and out of the hospital and then in tcu. Due to the quarantine from norovirus and being unable to drive, he was not able to see her much but was able to see her in the tcu the day before she passed. After her stroke and return to the hospital he was able to get a ride over the hospital and see her as she did pass away. He is seen today in his apartment alone. He says he does have some relatives in town helping with the service arrangements for Sydni. He says he has some living sisters still and sydni had some extended family. He says it has been lonely being here alone but he has a lot of friends around the facilities and he is still going down to the dining small for all his meals. He says the nurses are helping him with his medications and he is still able to dress himself. He gets around with his walker all the time. When  "asked how he is feeling about his wife's passing after 60 some years of marriage and he says \"Oh that is life I guess\". He is surprisingly somewhat positive and upbeat today. He says he is sad and mourning in his own way but he feels he is doing ok and has to keep moving on. He denies any trouble with his bowels or breathing. He hs not been ill at all. He is taking his medications every day and eating fine. He doesn't think he has lost weight at all recently. He has no pain. Discussed with  Nursing staff and they report he has been in pretty good spirits recently. They are setting up and administering his meds now. They have not changed any other services but it was suspected his wife was helping him more than staff are aware so over time will tell if he needs additional services.     REVIEW OF SYSTEMS:  PROBLEMS AND REVIEW OF SYSTEMS:   Review of Systems  Today on ROS:   Currently, no fever, chills, or rigors. Decreased vision and hearing. Denies any chest pain, headaches, palpitations, lightheadedness, dizziness. Appetite is good. Denies any GERD symptoms. Denies any difficulty with swallowing, nausea, or vomiting.  Denies any abdominal pain, diarrhea or constipation. Denies any urinary symptoms. No insomnia.  Positive for forgetfulness, short term memory issues, ambulates with walker, no shortness of breath, no recent back pain, nursing now manages meds, dining small for meals      Allergies   Allergen Reactions   ? Ciprofloxacin    ? Erythromycin Base    ? Oxycodone-Acetaminophen    ? Sulfa (Sulfonamide Antibiotics)      Current Outpatient Medications   Medication Sig   ? acetaminophen (TYLENOL) 500 MG tablet Take 1,000 mg by mouth 3 (three) times a day as needed for pain.    ? atropine 1 % ophthalmic solution 1 drop at bedtime.   ? memantine (NAMENDA XR) 7 mg CSpX Take 7 mg by mouth at bedtime.   ? metoprolol succinate (TOPROL XL) 50 MG 24 hr tablet Take 1.5 tablets (75 mg total) by mouth daily.   ? " nitroglycerin (NITROSTAT) 0.4 MG SL tablet Place 0.4 mg under the tongue as needed for chest pain.   ? polyethylene glycol (MIRALAX) 17 gram packet Take 17 g by mouth daily as needed.   ? prednisoLONE acetate (PRED-FORTE) 1 % ophthalmic suspension 1 drop every 3 (three) hours.   ? senna (SENOKOT) 8.6 mg tablet Take 2 tablets by mouth daily.          ? tamsulosin (FLOMAX) 0.4 mg Cp24 Take 1 capsule (0.4 mg total) by mouth daily.   ? trolamine salicylate (ASPERCREME) 10 % cream Apply 1 application topically every 6 (six) hours as needed.          ? warfarin (COUMADIN) 2.5 MG tablet Take 0.5-1 tablets (1.25-2.5 mg total) by mouth daily. Adjust dose per INR results as directed     Past Medical History:    Past Medical History:   Diagnosis Date   ? Anticoagulation goal of INR 2 to 3    ? Atrial fibrillation (H)    ? 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 CVA (cerebral infarction)    ? DNR (do not resuscitate) 10/18/2016   ? Former smoker    ? HTN (hypertension)    ? Knee osteoarthritis    ? Onychomycosis    ? Pacemaker    ? Prostate cancer (H)     Treated with radiation brachytherapy. Followed by [facility].     ? Vitamin B12 deficiency    ? Vitiligo            PHYSICAL EXAMINATION  Vitals:    02/26/20 0700   BP: 127/73   Pulse: 66   Resp: 16   Temp: 98.5  F (36.9  C)   SpO2: 98%   Weight: 156 lb (70.8 kg)       Today on physical exam:     GENERAL: Awake, Alert, oriented x3, not in any form of acute distress, answers questions appropriately, follows simple commands, conversant  HEENT: Head is normocephalic with normal hair distribution. No evidence of trauma. Ears: No acute purulent discharge. Eyes: Conjunctivae pink with no scleral jaundice. Nose: Normal mucosa and septum. NECK: Supple with no cervical or supraclavicular lymphadenopathy. Trachea is midline. Glasses, hearing aids, [place]  CHEST: No tenderness or deformity, no crepitus  LUNG: dim to auscultation with good chest expansion. There are no  crackles or wheezes, normal AP diameter. No shortness of breath no cough  BACK: chronic lumbar pain, no numb/tingling  CVS: irregularly irregular rhythm, systolic murmur,  2+ pulses symmetric in all extremities.  ABDOMEN: Rounded and soft, nontender to palpation, non distended, no masses, no organomegaly, good bowel sounds, no rebound or guarding, no peritoneal signs.   EXTREMITIES: No pedal edema, darin discoloration ble  SKIN: Warm and dry,   NEUROLOGICAL: The patient is oriented to person, place and time. Forgetful at times, short term memory issues, no numb/tingling ble.            LABS:   No results found for this or any previous visit (from the past 168 hour(s)).  Results for orders placed or performed during the hospital encounter of 05/07/18   Basic Metabolic Panel   Result Value Ref Range    Sodium 134 (L) 136 - 145 mmol/L    Potassium 4.5 3.5 - 5.0 mmol/L    Chloride 99 98 - 107 mmol/L    CO2 23 22 - 31 mmol/L    Anion Gap, Calculation 12 5 - 18 mmol/L    Glucose 104 70 - 125 mg/dL    Calcium 10.0 8.5 - 10.5 mg/dL    BUN 18 8 - 28 mg/dL    Creatinine 1.12 0.70 - 1.30 mg/dL    GFR MDRD Af Amer >60 >60 mL/min/1.73m2    GFR MDRD Non Af Amer >60 >60 mL/min/1.73m2         Lab Results   Component Value Date    WBC 8.6 05/29/2019    HGB 11.2 (L) 05/29/2019    HCT 35.3 (L) 05/29/2019     05/29/2019     05/29/2019       Lab Results   Component Value Date    [id number] 796 10/02/2018     No results found for: HGBA1C  Lab Results   Component Value Date    INR 2.61 (H) 02/21/2020    INR 4.12 (H) 02/14/2020    INR 2.61 (H) 01/10/2020     No results found for: [id number]  Lab Results   Component Value Date    TSH 2.08 08/04/2015           ASSESSMENT/PLAN:    1. Atrial fibrillation: Rate controlled 60s. No chest pain or palpitations. On metoprolol, warfarin. Anticoagulation clinic manages warfarin.   2. HTN: SBP 120s. On metoprolol. Stable since recent increase in metoprolol.  3. S/p pacemaker: Follows  with cardiology, [facility]. No recent concerns.   4. Pulmonary hypertension, Dyspnea: No recent shortness of breath. No chest pain or recent concerns. Follows with cardiology prn.   5. Cognitive impairment, vascular dementia:  on namenda 7mg xr. Very slow progressive decline, concern for further decline now that his wife has passed. Monitor closely and provide safe environment.   6. H/o prostate cancer: No recent issues. On flomax. Radiation seeding in 2004. Last follow up in December. No concerns today.   7. Lumbar stenosis, chronic back pain: No radiation, no numb/tingling. Tylenol prn, aspercreme prn. Sees chiropractor regularly in facility. No recent pain or concerns.   8. Macular degeneration, intraocular hemorrhage: Follows with Dr. Bill Figueroa. On atropine and prednisolone gtts. No concerns today.   9. H/o CVA: On warfarin, no recent concerns.   10. Constipation: On colace prn and taking prune juice. senna daily prn. Stable recently.   11. Depression, Grief: appears to be coping well today, in good spirits. Appears hygiene is in order today and self cares are still appropriate. Nursing has taken over med management as wife previously did this for him. Will need to monitor for other services he may need now that his wife is no longer able to help him.     Due for labs at next visit    Electronically signed by: Delmi Regan NP

## 2021-06-20 NOTE — LETTER
Letter by Delmi Regan NP at      Author: Delmi Regan NP Service: -- Author Type: --    Filed:  Encounter Date: 2020 Status: (Other)         Patient: Raymond Zheng   MR Number: [id number]   YOB: 1925   Date of Visit: 2020                 [facility]    DATE: 2020    NAME:  Raymond Zheng             :  1925  MRN: [id number]  CODE STATUS:  POLST on file    VISIT TYPE: Review Of Multiple Medical Conditions (chf, dementia)     FACILITY:  [facility] [place] [[id number]]       CHIEF COMPLAIN/REASON FOR VISIT:    Chief Complaint   Patient presents with   ? Review Of Multiple Medical Conditions     chf, dementia               HISTORY OF PRESENT ILLNESS: Raymond Zheng is a 89 y.o. male who is a resident of [facility]. He has PMH of A fib, CVA, prostate cancer, HTN, knee osteoarthritis, low back pain, lumbar stenosis, pacemaker, vit b12 deficiency, memory issues, mild pulmonary HTN, dependent edema, dyspnea.     Today Mr. Zheng is seen for review of systems today for chf and dementia. He is seen in his apartment alone today sitting in recliner. He says he has been doing very well. He denies any recent breathing concerns. He has no pain and thinks he has been sleeping well. He denies any swelling in his legs lately. He has been getting around fine. His appetite is good. He denies any nausea or stomach upset problems. He has not had any chest pain or pressure recently. He thinks he is doing very well lately. He denies any recent illnesses or specialty appointments. Per staff has been doing fine recently. His leg edema has been much improved. He is on weekly weights but most recent was 142lbs, otherwise no others recorded this month. Vitals are stable today.     REVIEW OF SYSTEMS:  PROBLEMS AND REVIEW OF SYSTEMS:   Review of Systems  Today on ROS:   Currently, no fever, chills, or rigors. Decreased vision and hearing. Denies any chest pain,  headaches, palpitations, lightheadedness, dizziness. Appetite is good. Denies any GERD symptoms. Denies any difficulty with swallowing, nausea, or vomiting.  Denies any abdominal pain, diarrhea or constipation. Denies any urinary symptoms. No insomnia.  Positive for forgetfulness, ambulates with walker, no recent back pain, nursing manages and administers meds, confusion, shortness of breath improved, no leg swelling today, unable to obtain further ROS due to cognition      Allergies   Allergen Reactions   ? Ciprofloxacin    ? Erythromycin Base    ? Oxycodone-Acetaminophen    ? Sulfa (Sulfonamide Antibiotics)      Current Outpatient Medications   Medication Sig   ? acetaminophen (TYLENOL) 500 MG tablet Take 1,000 mg by mouth 3 (three) times a day as needed for pain.    ? furosemide (LASIX) 40 MG tablet 1 TAB BY MOUTH DAILY (DX: EDEMA) (Patient taking differently: Take 20 mg by mouth daily. )   ? memantine (NAMENDA XR) 7 mg CSpX 1 CAP BY MOUTH EVERY BEDTIME (DX: MEMORY LOSS)   ? metoprolol succinate (TOPROL XL) 50 MG 24 hr tablet Take 1.5 tablets (75 mg total) by mouth daily.   ? nitroglycerin (NITROSTAT) 0.4 MG SL tablet Place 0.4 mg under the tongue as needed for chest pain.   ? polyethylene glycol (MIRALAX) 17 gram packet Take 17 g by mouth daily as needed.   ? potassium chloride (K-DUR,KLOR-CON) 20 MEQ tablet 1 TAB BY MOUTH DAILY (DX: HYPOKALEMIA) (Patient taking differently: Take 10 mEq by mouth daily. )   ? SENEXON-S 8.6-50 mg tablet 2 TABS BY MOUTH EVERY BEDTIME   ? trolamine salicylate (ASPERCREME) 10 % cream Apply 1 application topically every 6 (six) hours as needed.          ? warfarin (COUMADIN) 2.5 MG tablet Take 0.5-1 tablets (1.25-2.5 mg total) by mouth daily. Adjust dose per INR results as directed     Past Medical History:    Past Medical History:   Diagnosis Date   ? Anticoagulation goal of INR 2 to 3    ? Atrial fibrillation (H)    ? 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 CVA  (cerebral infarction)    ? DNR (do not resuscitate) 10/18/2016   ? Former smoker    ? HTN (hypertension)    ? Knee osteoarthritis    ? Onychomycosis    ? Pacemaker    ? Prostate cancer (H)     Treated with radiation brachytherapy. Followed by [facility].     ? Vitamin B12 deficiency    ? Vitiligo            PHYSICAL EXAMINATION  Vitals:    09/09/20 0700   BP: 127/75   Pulse: 86   Resp: 18   Temp: 97  F (36.1  C)   SpO2: 96%   Weight: 142 lb (64.4 kg)       Today on physical exam:     GENERAL: Awake, Alert, not in any form of acute distress, answers questions appropriately, follows simple commands, conversant, pleasant  HEENT: Head is normocephalic with normal hair distribution. No evidence of trauma. Ears: No acute purulent discharge. Eyes: Conjunctivae pink with no scleral jaundice. Nose: Normal mucosa and septum. NECK: Supple with no cervical or supraclavicular lymphadenopathy. Trachea is midline. Glasses, hearing aids, [place]  CHEST: No tenderness or deformity, no crepitus  LUNG: dim but clear to bilateral bases to auscultation with good chest expansion. There are no crackles or wheezes, normal AP diameter. No cough noted, no shortness of breath today  BACK: no lumbar pain, no radiation, no numb/tingling, moderate kyphosis  CVS: irregularly irregular rhythm, systolic murmur,  2+ pulses symmetric in all extremities.  ABDOMEN: Rounded and soft, nontender to palpation, non distended, no masses, no organomegaly, good bowel sounds, no rebound or guarding, no peritoneal signs.   EXTREMITIES: darin discoloration ble, no ble edema  SKIN: Warm and dry,   NEUROLOGICAL: The patient is oriented to person, place and time. Confused at times Forgetful at times, no numb/tingling ble.  Pleasantly confused            LABS:   Recent Results (from the past 168 hour(s))   INR   Result Value Ref Range    INR 2.43 (H) 0.90 - 1.10     Results for orders placed or performed in visit on 07/01/20   Basic Metabolic Panel   Result Value Ref  Range    Sodium 138 136 - 145 mmol/L    Potassium 4.0 3.5 - 5.0 mmol/L    Chloride 103 98 - 107 mmol/L    CO2 26 22 - 31 mmol/L    Anion Gap, Calculation 9 5 - 18 mmol/L    Glucose 69 (L) 70 - 125 mg/dL    Calcium 9.6 8.5 - 10.5 mg/dL    BUN 35 (H) 8 - 28 mg/dL    Creatinine 1.43 (H) 0.70 - 1.30 mg/dL    GFR MDRD Af Amer 56 (L) >60 mL/min/1.73m2    GFR MDRD Non Af Amer 46 (L) >60 mL/min/1.73m2         Lab Results   Component Value Date    WBC 8.6 05/29/2019    HGB 11.2 (L) 05/29/2019    HCT 35.3 (L) 05/29/2019     05/29/2019     05/29/2019       Lab Results   Component Value Date    [id number] 796 10/02/2018     No results found for: HGBA1C  Lab Results   Component Value Date    INR 2.43 (H) 09/09/2020    INR 2.05 (H) 08/26/2020    INR 1.61 (H) 08/12/2020     No results found for: [id number]  Lab Results   Component Value Date    TSH 2.08 08/04/2015           ASSESSMENT/PLAN:    Chronic CHF: no ble edema, h/o pulmonary htn, no shortness of breath today, weekly [id number]. Stable recently. Echo showed diastolic dysfunction, mild pulmonary htn, EF 60-65%, severe LA and RA dilation. No further exacerbations, reduce lasix to 20mg daily, decrease kcl to 10meq daily today. Much improved.   Atrial fibrillation:. No chest pain or palpitations. On metoprolol, warfarin. Anticoagulation clinic manages warfarin. INR today 2.43, stable.   HTN: SBP 120s. On metoprolol. No recent concerns.   S/p pacemaker: Follows with cardiology, [employer]. No recent concerns. Last device check 8/19.   Cognitive impairment, vascular dementia:  on namenda, slow progressive decline, more pronounced recently. Continue to provide safe environment.   H/o prostate cancer: Radiation seeding in 2004. No recent follow ups, stable at this time.   Lumbar stenosis, chronic back pain: No radiation, no numb/tingling. Tylenol prn, aspercreme prn. No recent complaints of pain or use of tylenol. Stable today.   Macular  degeneration, intraocular hemorrhage: Follows with Dr. Bill Figueroa. On atropine and prednisolone gtts. No concerns today.   H/o CVA: On warfarin, no recent concerns.   Constipation: On colace prn and taking prune juice. senna daily prn. Stable recently.   CKD stage 2: cr 1.24, GFR 50 on 6/24. Cr 1.43 on 7/1. Stable.     Electronically signed by: Delmi Regan NP

## 2021-06-20 NOTE — LETTER
Letter by Delmi Regan NP at      Author: Delmi Regan NP Service: -- Author Type: --    Filed:  Encounter Date: 2020 Status: (Other)         Patient: Raymond Zheng   MR Number: [id number]   YOB: 1925   Date of Visit: 2020                 [facility]    DATE: 2020    NAME:  Raymond Zheng             :  1925  MRN: [id number]  CODE STATUS:  POLST on file    VISIT TYPE: Problem Visit (fluid overload)     FACILITY:  [facility] [place] [[id number]]       CHIEF COMPLAIN/REASON FOR VISIT:    Chief Complaint   Patient presents with   ? Problem Visit     fluid overload               HISTORY OF PRESENT ILLNESS: Raymond Zheng is a 89 y.o. male who is a resident of [facility]. He has PMH of A fib, CVA, prostate cancer, HTN, knee osteoarthritis, low back pain, lumbar stenosis, pacemaker, vit b12 deficiency, memory issues, mild pulmonary HTN, dependent edema, dyspnea.     Today Mr. Zheng is seen for concerns of persistent leg edema and fluid overload. On exam he is seen in his apartment alone. He says he is not having issues with shortness of breath but his legs continue to remain very swollen. He says he is not sure if they have improved over the last few weeks. He thinks he tries to elevate them but not sure. He is not wearing compression stockings and not sure if he has any. He doesn't think the staff have been putting these on. He denies any dizziness, headaches, or appetite changes. His bowels are working fine and he thinks he is sleeping well. He is sleeping in the bed and denies using more pillows than before or having difficulty  Laying flat. He is not having any chest pain, pressure, or palpitations. We discussed his last heart ultrasound was a few years ago and the concerns for symptoms of progressive heart failure. He agrees to doing an echocardiogram, we discussed will see if PPX can do this in the facility. We discussed  increasing diuretics further and importance of compression therapy and elevating legs. Per nursing he had compression stockings and they were putting these on him but then he threw them away. He also had tubigrips then and they can no longer find these either in his apartment. Therapy was asking about doing lymphedema wrapping on him as he would not be able to remove these himself. Staff do not believe he is elevating his legs. He has become more confused recently for staff as well. He did have bmp drawn today. His weights are documented as being done weekly but on review of numbers the only two weights recorded this month was a 142 and 169lbs so appears to be inconsistent. Staff do report he remains very winded and out of breath with any activity.     REVIEW OF SYSTEMS:  PROBLEMS AND REVIEW OF SYSTEMS:   Review of Systems  Today on ROS:   Currently, no fever, chills, or rigors. Decreased vision and hearing. Denies any chest pain, headaches, palpitations, lightheadedness, dizziness. Appetite is good. Denies any GERD symptoms. Denies any difficulty with swallowing, nausea, or vomiting.  Denies any abdominal pain, diarrhea or constipation. Denies any urinary symptoms. No insomnia.  Positive for forgetfulness, ambulates with walker,  no recent back pain, nursing manages and administers meds, leg swelling, confusion, denies shortness of breath but appears shortness of breath when ambulating      Allergies   Allergen Reactions   ? Ciprofloxacin    ? Erythromycin Base    ? Oxycodone-Acetaminophen    ? Sulfa (Sulfonamide Antibiotics)      Current Outpatient Medications   Medication Sig   ? acetaminophen (TYLENOL) 500 MG tablet Take 1,000 mg by mouth 3 (three) times a day as needed for pain.    ? furosemide (LASIX) 20 MG tablet Take 20 mg by mouth daily.    ? memantine (NAMENDA XR) 7 mg CSpX 1 CAP BY MOUTH EVERY BEDTIME (DX: MEMORY LOSS)   ? metoprolol succinate (TOPROL XL) 50 MG 24 hr tablet Take 1.5 tablets (75 mg total)  by mouth daily.   ? nitroglycerin (NITROSTAT) 0.4 MG SL tablet Place 0.4 mg under the tongue as needed for chest pain.   ? polyethylene glycol (MIRALAX) 17 gram packet Take 17 g by mouth daily as needed.   ? potassium chloride (KLOR-CON) 10 MEQ CR tablet Take 10 mEq by mouth daily.    ? SENEXON-S 8.6-50 mg tablet 2 TABS BY MOUTH EVERY BEDTIME   ? senna (SENOKOT) 8.6 mg tablet Take 2 tablets by mouth at bedtime.    ? trolamine salicylate (ASPERCREME) 10 % cream Apply 1 application topically every 6 (six) hours as needed.          ? warfarin (COUMADIN) 2.5 MG tablet Take 0.5-1 tablets (1.25-2.5 mg total) by mouth daily. Adjust dose per INR results as directed     Past Medical History:    Past Medical History:   Diagnosis Date   ? Anticoagulation goal of INR 2 to 3    ? Atrial fibrillation (H)    ? 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 CVA (cerebral infarction)    ? DNR (do not resuscitate) 10/18/2016   ? Former smoker    ? HTN (hypertension)    ? Knee osteoarthritis    ? Onychomycosis    ? Pacemaker    ? Prostate cancer (H)     Treated with radiation brachytherapy. Followed by [facility].     ? Vitamin B12 deficiency    ? Vitiligo            PHYSICAL EXAMINATION  There were no vitals filed for this visit.    Today on physical exam:     GENERAL: Awake, Alert, not in any form of acute distress, answers questions appropriately, follows simple commands, conversant, pleasant  HEENT: Head is normocephalic with normal hair distribution. No evidence of trauma. Ears: No acute purulent discharge. Eyes: Conjunctivae pink with no scleral jaundice. Nose: Normal mucosa and septum. NECK: Supple with no cervical or supraclavicular lymphadenopathy. Trachea is midline. Glasses, hearing aids, [place]  CHEST: No tenderness or deformity, no crepitus  LUNG: dim with few crackles to bilateral bases to auscultation with good chest expansion. There are no crackles or wheezes, normal AP diameter. No cough noted, shortness  of breath noted at rest, per staff is shortness of breath with any activity  BACK: chronic lumbar pain but denies today, no radiation, no numb/tingling, ,moderate kyphosis  CVS: irregularly irregular rhythm, systolic murmur,  2+ pulses symmetric in all extremities.  ABDOMEN: Rounded and soft, nontender to palpation, non distended, no masses, no organomegaly, good bowel sounds, no rebound or guarding, no peritoneal signs.   EXTREMITIES: darin discoloration ble, 2+ ble pitting edema  SKIN: Warm and dry,   NEUROLOGICAL: The patient is oriented to person, place and time. Confused at times Forgetful at times, no numb/tingling ble.  More flat affect than baseline            LABS:   Recent Results (from the past 168 hour(s))   Basic Metabolic Panel   Result Value Ref Range    Sodium 142 136 - 145 mmol/L    Potassium 3.9 3.5 - 5.0 mmol/L    Chloride 107 98 - 107 mmol/L    CO2 25 22 - 31 mmol/L    Anion Gap, Calculation 10 5 - 18 mmol/L    Glucose 68 (L) 70 - 125 mg/dL    Calcium 9.4 8.5 - 10.5 mg/dL    BUN 32 (H) 8 - 28 mg/dL    Creatinine 1.34 (H) 0.70 - 1.30 mg/dL    GFR MDRD Af Amer 60 (L) >60 mL/min/1.73m2    GFR MDRD Non Af Amer 50 (L) >60 mL/min/1.73m2     Results for orders placed or performed in visit on 06/24/20   Basic Metabolic Panel   Result Value Ref Range    Sodium 142 136 - 145 mmol/L    Potassium 3.9 3.5 - 5.0 mmol/L    Chloride 107 98 - 107 mmol/L    CO2 25 22 - 31 mmol/L    Anion Gap, Calculation 10 5 - 18 mmol/L    Glucose 68 (L) 70 - 125 mg/dL    Calcium 9.4 8.5 - 10.5 mg/dL    BUN 32 (H) 8 - 28 mg/dL    Creatinine 1.34 (H) 0.70 - 1.30 mg/dL    GFR MDRD Af Amer 60 (L) >60 mL/min/1.73m2    GFR MDRD Non Af Amer 50 (L) >60 mL/min/1.73m2         Lab Results   Component Value Date    WBC 8.6 05/29/2019    HGB 11.2 (L) 05/29/2019    HCT 35.3 (L) 05/29/2019     05/29/2019     05/29/2019       Lab Results   Component Value Date    [id number] 796 10/02/2018     No results found for: HGBA1C  Lab  Results   Component Value Date    INR 2.38 (H) 06/17/2020    INR 2.20 (H) 06/04/2020    INR 3.38 (H) 05/20/2020     No results found for: [id number]  Lab Results   Component Value Date    TSH 2.08 08/04/2015           ASSESSMENT/PLAN:    Fluid overload, dependent edema, Dyspnea, Acute on chronic CHF: 2+ ble pitting edema, h/o pulmonary htn, intermittent dyspnea. shortness of breath with exertion, weekly [id number], also staff had reported a 171lbs at one point. Unclear that these are accurate weights. Continue weekly weights, try  To use same scale, weigh patient same time of day with same amount of clothing. Edema continues to remain worse than baseline, noncompliant with elevation and compression stockings. Will order lymphedema wrapping per therapy. Bmp 6/24 stable, on lasix daily. Will add metolazone 5mg x 3 days prior to lasix. Monitor for weight loss. Ordered Echocardiogram as last done in October 2017 with no notes of heart failure noted. Appears to have developed acute on chronic CHF. Repeat bmp  On 7/1.   Atrial fibrillation:. No chest pain or palpitations. On metoprolol, warfarin. Anticoagulation clinic manages warfarin. stable  HTN: SBP. On metoprolol. No recent concerns.   S/p pacemaker: Follows with cardiology, [facility]. No recent concerns.   Cognitive impairment, vascular dementia:  on namenda 7mg xr. Slow progressive decline, per staff more confusion noted since wife passed away.   H/o prostate cancer: No recent issues. On flomax. Radiation seeding in 2004. Last follow up in December. No concerns today. Dc flomax risk v benefit. Med reductions.  Lumbar stenosis, chronic back pain: No radiation, no numb/tingling. Tylenol prn, aspercreme prn. Sees chiropractor regularly in facility. No recent pain or concerns.   Macular degeneration, intraocular hemorrhage: Follows with Dr. Bill Figueroa. On atropine and prednisolone gtts. No concerns today.   H/o CVA: On warfarin, no recent concerns.    Constipation: On colace prn and taking prune juice. senna daily prn. Stable recently.   Weights: [id number] but also has a 142lbs weight documented this month.  Weigh weekly, notify for weight change of 5 lbs.   CKD stage 2: cr 1.24, GFR 50 on 6/24. Previously 1.32, gfr 51.     Electronically signed by: Delmi Regan NP

## 2021-06-20 NOTE — LETTER
Letter by Delmi Regan NP at      Author: Delmi Regan NP Service: -- Author Type: --    Filed:  Encounter Date: 2020 Status: (Other)         Patient: Raymond Zheng   MR Number: [id number]   YOB: 1925   Date of Visit: 2020                 [facility]    DATE: 2020    NAME:  Raymond Zheng             :  1925  MRN: [id number]  CODE STATUS:  POLST on file    VISIT TYPE: Problem Visit (fluid overload, edema)     FACILITY:  [facility] [[id number]]       CHIEF COMPLAIN/REASON FOR VISIT:    Chief Complaint   Patient presents with   ? Problem Visit     fluid overload, edema               HISTORY OF PRESENT ILLNESS: Raymond Zheng is a 89 y.o. male who is a resident of [facility]. He has PMH of A fib, CVA, prostate cancer, HTN, knee osteoarthritis, low back pain, lumbar stenosis, pacemaker, vit b12 deficiency, memory issues, mild pulmonary HTN, dependent edema, dyspnea.     Today Mr. Zheng is seen for concerns of persistent leg edema and fluid overload. He is seen sitting in a chair today. His legs are dependent position. He was noted to be ambulating in the hallways around the [facility] living facility with  His walker today. Staff also report he missed his echo that was scheduled for yesterday  Because he went out of the building with friends. Staff were not aware he was leaving until after he left. Echo has been rescheduled for next week. He did have labs drawn this morning. He has not been noted to be as short of breath when walking but his legs continue to remain very swollen. He continues to be noncompliant with  Compression stockings and nursing was not sure why therapy has not started lymphedema treatments yet. He did complete recent metolazone burst. He says today that he feels fine. He doesn't think he is short of breath or having any  Issues with breathing. He does say his legs are still swollen but that they might be  better than they  Were. He is not having any bowel concerns and urinating fine. He denies any Issues sleeping. He does like to walk and has been trying to do this more. He has been going out at times and visiting friends. He denies any sore throat or cough.     REVIEW OF SYSTEMS:  PROBLEMS AND REVIEW OF SYSTEMS:   Review of Systems  Today on ROS:   Currently, no fever, chills, or rigors. Decreased vision and hearing. Denies any chest pain, headaches, palpitations, lightheadedness, dizziness. Appetite is good. Denies any GERD symptoms. Denies any difficulty with swallowing, nausea, or vomiting.  Denies any abdominal pain, diarrhea or constipation. Denies any urinary symptoms. No insomnia.  Positive for forgetfulness, ambulates with walker,  no recent back pain, nursing manages and administers meds, leg swelling, confusion, denies shortness of breath but appears shortness of breath with exertion (improved from previous visit though)      Allergies   Allergen Reactions   ? Ciprofloxacin    ? Erythromycin Base    ? Oxycodone-Acetaminophen    ? Sulfa (Sulfonamide Antibiotics)      Current Outpatient Medications   Medication Sig   ? acetaminophen (TYLENOL) 500 MG tablet Take 1,000 mg by mouth 3 (three) times a day as needed for pain.    ? furosemide (LASIX) 20 MG tablet Take 40 mg by mouth daily.    ? memantine (NAMENDA XR) 7 mg CSpX 1 CAP BY MOUTH EVERY BEDTIME (DX: MEMORY LOSS)   ? metoprolol succinate (TOPROL XL) 50 MG 24 hr tablet Take 1.5 tablets (75 mg total) by mouth daily.   ? nitroglycerin (NITROSTAT) 0.4 MG SL tablet Place 0.4 mg under the tongue as needed for chest pain.   ? polyethylene glycol (MIRALAX) 17 gram packet Take 17 g by mouth daily as needed.   ? potassium chloride (KLOR-CON) 10 MEQ CR tablet Take 20 mEq by mouth daily.    ? SENEXON-S 8.6-50 mg tablet 2 TABS BY MOUTH EVERY BEDTIME   ? senna (SENOKOT) 8.6 mg tablet Take 2 tablets by mouth at bedtime.    ? trolamine salicylate (ASPERCREME) 10 % cream  Apply 1 application topically every 6 (six) hours as needed.          ? warfarin (COUMADIN) 2.5 MG tablet Take 0.5-1 tablets (1.25-2.5 mg total) by mouth daily. Adjust dose per INR results as directed     Past Medical History:    Past Medical History:   Diagnosis Date   ? Anticoagulation goal of INR 2 to 3    ? Atrial fibrillation (H)    ? 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 CVA (cerebral infarction)    ? DNR (do not resuscitate) 10/18/2016   ? Former smoker    ? HTN (hypertension)    ? Knee osteoarthritis    ? Onychomycosis    ? Pacemaker    ? Prostate cancer (H)     Treated with radiation brachytherapy. Followed by [facility].     ? Vitamin B12 deficiency    ? Vitiligo            PHYSICAL EXAMINATION  Vitals:    07/01/20 0700   BP: 129/77   Pulse: 94   Resp: 20   Temp: 98  F (36.7  C)   SpO2: 94%   Weight: 148 lb (67.1 kg)       Today on physical exam:     GENERAL: Awake, Alert, not in any form of acute distress, answers questions appropriately, follows simple commands, conversant, pleasant  HEENT: Head is normocephalic with normal hair distribution. No evidence of trauma. Ears: No acute purulent discharge. Eyes: Conjunctivae pink with no scleral jaundice. Nose: Normal mucosa and septum. NECK: Supple with no cervical or supraclavicular lymphadenopathy. Trachea is midline. Glasses, hearing aids, [place]  CHEST: No tenderness or deformity, no crepitus  LUNG: dim but clear to bilateral bases to auscultation with good chest expansion. There are no crackles or wheezes, normal AP diameter. No cough noted, no shortness of breath at rest, some with  Ambulation but improved from previous visit  BACK: chronic lumbar pain but denies today, no radiation, no numb/tingling, ,moderate kyphosis  CVS: irregularly irregular rhythm, systolic murmur,  2+ pulses symmetric in all extremities.  ABDOMEN: Rounded and soft, nontender to palpation, non distended, no masses, no organomegaly, good bowel sounds, no  rebound or guarding, no peritoneal signs.   EXTREMITIES: darin discoloration ble, 2+ ble pitting edema  SKIN: Warm and dry,   NEUROLOGICAL: The patient is oriented to person, place and time. Confused at times Forgetful at times, no numb/tingling ble.  More pleasant and  today            LABS:   Recent Results (from the past 168 hour(s))   Basic Metabolic Panel   Result Value Ref Range    Sodium 138 136 - 145 mmol/L    Potassium 4.0 3.5 - 5.0 mmol/L    Chloride 103 98 - 107 mmol/L    CO2 26 22 - 31 mmol/L    Anion Gap, Calculation 9 5 - 18 mmol/L    Glucose 69 (L) 70 - 125 mg/dL    Calcium 9.6 8.5 - 10.5 mg/dL    BUN 35 (H) 8 - 28 mg/dL    Creatinine 1.43 (H) 0.70 - 1.30 mg/dL    GFR MDRD Af Amer 56 (L) >60 mL/min/1.73m2    GFR MDRD Non Af Amer 46 (L) >60 mL/min/1.73m2     Results for orders placed or performed in visit on 07/01/20   Basic Metabolic Panel   Result Value Ref Range    Sodium 138 136 - 145 mmol/L    Potassium 4.0 3.5 - 5.0 mmol/L    Chloride 103 98 - 107 mmol/L    CO2 26 22 - 31 mmol/L    Anion Gap, Calculation 9 5 - 18 mmol/L    Glucose 69 (L) 70 - 125 mg/dL    Calcium 9.6 8.5 - 10.5 mg/dL    BUN 35 (H) 8 - 28 mg/dL    Creatinine 1.43 (H) 0.70 - 1.30 mg/dL    GFR MDRD Af Amer 56 (L) >60 mL/min/1.73m2    GFR MDRD Non Af Amer 46 (L) >60 mL/min/1.73m2         Lab Results   Component Value Date    WBC 8.6 05/29/2019    HGB 11.2 (L) 05/29/2019    HCT 35.3 (L) 05/29/2019     05/29/2019     05/29/2019       Lab Results   Component Value Date    CFYHBAXR22 796 10/02/2018     No results found for: HGBA1C  Lab Results   Component Value Date    INR 2.38 (H) 06/17/2020    INR 2.20 (H) 06/04/2020    INR 3.38 (H) 05/20/2020     No results found for: [id number]  Lab Results   Component Value Date    TSH 2.08 08/04/2015           ASSESSMENT/PLAN:    Fluid overload, dependent edema, Dyspnea, Acute on chronic CHF: 2+ ble pitting edema, h/o pulmonary htn, intermittent dyspnea. shortness of breath  with exertion althougt improved from previous visit, weekly [id number], feel 169 and possibly 171  was inaccurate.  Unclear that these are accurate weights. Continue weekly weights,noncompliant with compression stockings. Lymphedema treatments ordered last week per therapy but have not started-staff to check on this today. Bmp today cr 1.43, previously  Completed metolazone burst. appears mildly  Improved today. Continue to educate on importance of low sodium intake, elevation. Unfortunately  Missed echo appointment for yesterday, rescheduled for next week. Will increase daily lasix to 40mg. Ordered Echocardiogram as last done in October 2017 with no notes of heart failure noted. Appears to have developed acute on chronic CHF.   Atrial fibrillation:. No chest pain or palpitations. On metoprolol, warfarin. Anticoagulation clinic manages warfarin. Stable recently.   HTN: SBP. On metoprolol. No recent concerns.   S/p pacemaker: Follows with cardiology, [facility]. No recent concerns.   Cognitive impairment, vascular dementia:  on namenda 7mg xr. Slow progressive decline, per staff more confusion noted since wife passed away.   H/o prostate cancer: No recent issues. On flomax. Radiation seeding in 2004. Last follow up in December. No concerns today. Dc flomax risk v benefit. Med reductions.  Lumbar stenosis, chronic back pain: No radiation, no numb/tingling. Tylenol prn, aspercreme prn. Sees chiropractor regularly in facility. No recent pain or concerns.   Macular degeneration, intraocular hemorrhage: Follows with Dr. Bill Figueroa. On atropine and prednisolone gtts. No concerns today.   H/o CVA: On warfarin, no recent concerns.   Constipation: On colace prn and taking prune juice. senna daily prn. Stable recently.   Weights: [id number] but also has a 142lbs weight documented this month.  Weigh weekly, notify for weight change of 5 lbs.   CKD stage 2: cr 1.24, GFR 50 on 6/24. Previously 1.32, gfr 51.      Electronically signed by: Delmi Regan NP

## 2021-06-20 NOTE — LETTER
Letter by William Corbett MD at      Author: William Corbett MD Service: -- Author Type: --    Filed:  Encounter Date: 5/28/2020 Status: (Other)         Raymond Cortez  [address] [place] [address]      May 28, 2020      Dear Raymond,    This letter is to remind you that you will be due for your follow up appointment with Dr. William Corbett in June, 2020 . To help ensure you are in the best health possible, a regular follow-up with your cardiologist is essential.     Please call our Patient Scheduling Line at [phone] to schedule your appointment at your earliest convenience.  If you have recently scheduled an appointment, please disregard this letter.    We look forward to seeing you again. As always, we are available at the number  above for any questions or concerns you may have.      Sincerely,     The Physicians and Staff of [facility] [place]

## 2021-06-20 NOTE — LETTER
Letter by Delmi Regan NP at      Author: Delmi Regan NP Service: -- Author Type: --    Filed:  Encounter Date: 2020 Status: (Other)         Patient: Raymond Zheng   MR Number: [id number]   YOB: 1925   Date of Visit: 2020                 [facility]    DATE: 2020    NAME:  Raymond Zheng             :  1925  MRN: [id number]  CODE STATUS:  POLST on file    VISIT TYPE: Review Of Multiple Medical Conditions (chf, cognitive decline)     FACILITY:  [facility] [[id number]]       CHIEF COMPLAIN/REASON FOR VISIT:    Chief Complaint   Patient presents with   ? Review Of Multiple Medical Conditions     chf, cognitive decline               HISTORY OF PRESENT ILLNESS: Raymond Zheng is a 89 y.o. male who is a resident of [facility]. He has PMH of A fib, CVA, prostate cancer, HTN, knee osteoarthritis, low back pain, lumbar stenosis, pacemaker, vit b12 deficiency, memory issues, mild pulmonary HTN, dependent edema, dyspnea.     Today Mr. Zheng is seen for follow up on CHF and cognitive decline. Staff report he has been declining cognitive more recently. He seems to be more and more confused. He does wander on occasion. He is not as short of breath as he was before. His leg swelling is improving. His vitals have been stable and weights are down. He did have his echo on  and this showed mild LVH, severe left atrial and right atrial dilation, mild RV dilation, aortic valve sclerosis, moderate tricuspid regurg, mild pulmonary htn. He is seen in his apartment today. He had just come back from walking the small with his walker. He is noted to be hunched over his walker when he ambulates. He says he has been doing fine lately. He does not feel short of breath and did not look as short of breath with ambulation today. He thinks his leg swelling is better today. He denies any  Pain or other concerns.     REVIEW OF SYSTEMS:  PROBLEMS AND  REVIEW OF SYSTEMS:   Review of Systems  Today on ROS:   Currently, no fever, chills, or rigors. Decreased vision and hearing. Denies any chest pain, headaches, palpitations, lightheadedness, dizziness. Appetite is good. Denies any GERD symptoms. Denies any difficulty with swallowing, nausea, or vomiting.  Denies any abdominal pain, diarrhea or constipation. Denies any urinary symptoms. No insomnia.  Positive for forgetfulness, ambulates with walker,  no recent back pain, nursing manages and administers meds, confusion, shortness of breath improved, edema improving, worsening confusion, cognitive decline      Allergies   Allergen Reactions   ? Ciprofloxacin    ? Erythromycin Base    ? Oxycodone-Acetaminophen    ? Sulfa (Sulfonamide Antibiotics)      Current Outpatient Medications   Medication Sig   ? acetaminophen (TYLENOL) 500 MG tablet Take 1,000 mg by mouth 3 (three) times a day as needed for pain.    ? furosemide (LASIX) 40 MG tablet 1 TAB BY MOUTH DAILY (DX: EDEMA)   ? memantine (NAMENDA XR) 7 mg CSpX 1 CAP BY MOUTH EVERY BEDTIME (DX: MEMORY LOSS)   ? metoprolol succinate (TOPROL XL) 50 MG 24 hr tablet Take 1.5 tablets (75 mg total) by mouth daily.   ? nitroglycerin (NITROSTAT) 0.4 MG SL tablet Place 0.4 mg under the tongue as needed for chest pain.   ? polyethylene glycol (MIRALAX) 17 gram packet Take 17 g by mouth daily as needed.   ? potassium chloride (K-DUR,KLOR-CON) 20 MEQ tablet 1 TAB BY MOUTH DAILY (DX: HYPOKALEMIA)   ? SENEXON-S 8.6-50 mg tablet 2 TABS BY MOUTH EVERY BEDTIME   ? trolamine salicylate (ASPERCREME) 10 % cream Apply 1 application topically every 6 (six) hours as needed.          ? warfarin (COUMADIN) 2.5 MG tablet Take 0.5-1 tablets (1.25-2.5 mg total) by mouth daily. Adjust dose per INR results as directed     Past Medical History:    Past Medical History:   Diagnosis Date   ? Anticoagulation goal of INR 2 to 3    ? Atrial fibrillation (H)    ? Bleeding diathesis (H) - Secondary to  warfarin therapy    ? CVA (cerebral infarction)    ? DNR (do not resuscitate) 10/18/2016   ? Former smoker    ? HTN (hypertension)    ? Knee osteoarthritis    ? Onychomycosis    ? Pacemaker    ? Prostate cancer (H)     Treated with radiation brachytherapy. Followed by [facility].     ? Vitamin B12 deficiency    ? Vitiligo            PHYSICAL EXAMINATION  Vitals:    08/05/20 0700   BP: (!) 137/96   Pulse: 73   Resp: 18   Temp: (!) 96.2  F (35.7  C)   SpO2: 90%   Weight: 143 lb (64.9 kg)       Today on physical exam:     GENERAL: Awake, Alert, not in any form of acute distress, answers questions appropriately, follows simple commands, conversant, pleasant  HEENT: Head is normocephalic with normal hair distribution. No evidence of trauma. Ears: No acute purulent discharge. Eyes: Conjunctivae pink with no scleral jaundice. Nose: Normal mucosa and septum. NECK: Supple with no cervical or supraclavicular lymphadenopathy. Trachea is midline. Glasses, hearing aids, [place]  CHEST: No tenderness or deformity, no crepitus  LUNG: dim but clear to bilateral bases to auscultation with good chest expansion. There are no crackles or wheezes, normal AP diameter. No cough noted, no shortness of breath noted today  BACK: chronic lumbar pain but denies today, no radiation, no numb/tingling, moderate kyphosis  CVS: irregularly irregular rhythm, systolic murmur,  2+ pulses symmetric in all extremities.  ABDOMEN: Rounded and soft, nontender to palpation, non distended, no masses, no organomegaly, good bowel sounds, no rebound or guarding, no peritoneal signs.   EXTREMITIES: darin discoloration ble, 1+ ble nonpitting edema  SKIN: Warm and dry,   NEUROLOGICAL: The patient is oriented to person, place and time. Confused at times Forgetful at times, no numb/tingling ble.  Pleasantly confused            LABS:   No results found for this or any previous visit (from the past 168 hour(s)).  Results for orders placed or performed in visit on  07/01/20   Basic Metabolic Panel   Result Value Ref Range    Sodium 138 136 - 145 mmol/L    Potassium 4.0 3.5 - 5.0 mmol/L    Chloride 103 98 - 107 mmol/L    CO2 26 22 - 31 mmol/L    Anion Gap, Calculation 9 5 - 18 mmol/L    Glucose 69 (L) 70 - 125 mg/dL    Calcium 9.6 8.5 - 10.5 mg/dL    BUN 35 (H) 8 - 28 mg/dL    Creatinine 1.43 (H) 0.70 - 1.30 mg/dL    GFR MDRD Af Amer 56 (L) >60 mL/min/1.73m2    GFR MDRD Non Af Amer 46 (L) >60 mL/min/1.73m2         Lab Results   Component Value Date    WBC 8.6 05/29/2019    HGB 11.2 (L) 05/29/2019    HCT 35.3 (L) 05/29/2019     05/29/2019     05/29/2019       Lab Results   Component Value Date    [id number] 796 10/02/2018     No results found for: HGBA1C  Lab Results   Component Value Date    INR 2.04 (H) 07/15/2020    INR 2.38 (H) 06/17/2020    INR 2.20 (H) 06/04/2020     No results found for: [id number]  Lab Results   Component Value Date    TSH 2.08 08/04/2015           ASSESSMENT/PLAN:    Fluid overload, dependent edema, Dyspnea, Acute on chronic CHF: 1+ ble non pitting edema, h/o pulmonary htn, intermittent dyspnea. shortness of breath with exertion improved recently, weekly [id number],  Completed metolazone, much improved. Weights back to baseline. Echo showed diastolic dysfunction, mild pulmonary htn, EF 60-65%, severe LA and RA dilation. Stable at this time. Continue lasix 40mg daily.   Atrial fibrillation:. No chest pain or palpitations. On metoprolol, warfarin. Anticoagulation clinic manages warfarin.   HTN: SBP 130s. On metoprolol. No recent concerns.   S/p pacemaker: Follows with cardiology, [facility] [facility]. No recent concerns.   Cognitive impairment, vascular dementia:  on namenda, slow progressive decline, more pronounced recently. Continue to provide safe environment.   H/o prostate cancer: No recent issues. On flomax. Radiation seeding in 2004. Last follow up in December. No concerns today. Dc flomax risk v benefit. Med  reductions.  Lumbar stenosis, chronic back pain: No radiation, no numb/tingling. Tylenol prn, aspercreme prn. No recent concerns.   Macular degeneration, intraocular hemorrhage: Follows with Dr. Bill Figueroa. On atropine and prednisolone gtts. No concerns today.   H/o CVA: On warfarin, no recent concerns.   Constipation: On colace prn and taking prune juice. senna daily prn. Stable recently.   CKD stage 2: cr 1.24, GFR 50 on 6/24. Previously 1.32, gfr 51.     Electronically signed by: Delmi Regan NP

## 2021-06-20 NOTE — LETTER
Letter by Delmi Regan NP at      Author: Delmi Regan NP Service: -- Author Type: --    Filed:  Encounter Date: 3/4/2020 Status: (Other)         Patient: Raymond Zheng   MR Number: [id number]   YOB: 1925   Date of Visit: 3/4/2020                 [facility]    DATE: 3/4/2020    NAME:  Raymond Zheng             :  1925  MRN: [id number]  CODE STATUS:  POLST on file    VISIT TYPE: Review Of Multiple Medical Conditions (depression, cva, memory issues, a fib)     FACILITY:  [facility] [[id number]]       CHIEF COMPLAIN/REASON FOR VISIT:    Chief Complaint   Patient presents with   ? Review Of Multiple Medical Conditions     depression, cva, memory issues, a fib               HISTORY OF PRESENT ILLNESS: Raymond Zheng is a 89 y.o. male who is a resident of [facility]. He has PMH of A fib, CVA, prostate cancer, HTN, knee osteoarthritis, low back pain, lumbar stenosis, pacemaker, vit b12 deficiency, memory issues, mild pulmonary HTN, dependent edema, dyspnea.     Today Mr. Zheng is seen for review of systems today and follow up on memory issues, depression, cva, a fib. He is seen in his room alone. He says he is doing well today. He has no pain and has not had any back pain recently. He cannot recall the last time he needed tylenol or anything for pain. He says he sleeps well and is eating well. His services are all over for Sydni now and he reports everything went very well. They had a good turn out. He denies any worse depression symptoms today and says he is just getting used to his new normal without her. He has a lot of friends around here. He denies any cough or urinary trouble. He is not having any constipation or diarrhea. He is taking his meds as ordered and no recent concerns with his memory. Nursing reports that with stopping some of his supplements recently he is just taking one medication in the morning his senna and wondering  if they can change that to evening because then he would not have to pay services for two med administrations a day. Otherwise his vitals and weight has been stable. Now that his wife is passed his health care agent noted on his ACP documents is per Davies who is a friend.     REVIEW OF SYSTEMS:  PROBLEMS AND REVIEW OF SYSTEMS:   Review of Systems  Today on ROS:   Currently, no fever, chills, or rigors. Decreased vision and hearing. Denies any chest pain, headaches, palpitations, lightheadedness, dizziness. Appetite is good. Denies any GERD symptoms. Denies any difficulty with swallowing, nausea, or vomiting.  Denies any abdominal pain, diarrhea or constipation. Denies any urinary symptoms. No insomnia.  Positive for forgetfulness, ambulates with walker, no shortness of breath, no recent back pain, nursing manages and administers meds, dining small for meals      Allergies   Allergen Reactions   ? Ciprofloxacin    ? Erythromycin Base    ? Oxycodone-Acetaminophen    ? Sulfa (Sulfonamide Antibiotics)      Current Outpatient Medications   Medication Sig   ? acetaminophen (TYLENOL) 500 MG tablet Take 1,000 mg by mouth 3 (three) times a day as needed for pain.    ? atropine 1 % ophthalmic solution 1 drop at bedtime.   ? memantine (NAMENDA XR) 7 mg CSpX Take 7 mg by mouth at bedtime.   ? metoprolol succinate (TOPROL XL) 50 MG 24 hr tablet Take 1.5 tablets (75 mg total) by mouth daily.   ? nitroglycerin (NITROSTAT) 0.4 MG SL tablet Place 0.4 mg under the tongue as needed for chest pain.   ? polyethylene glycol (MIRALAX) 17 gram packet Take 17 g by mouth daily as needed.   ? prednisoLONE acetate (PRED-FORTE) 1 % ophthalmic suspension 1 drop every 3 (three) hours.   ? senna (SENOKOT) 8.6 mg tablet Take 2 tablets by mouth at bedtime.    ? tamsulosin (FLOMAX) 0.4 mg Cp24 Take 1 capsule (0.4 mg total) by mouth daily.   ? trolamine salicylate (ASPERCREME) 10 % cream Apply 1 application topically every 6 (six) hours as  needed.          ? warfarin (COUMADIN) 2.5 MG tablet Take 0.5-1 tablets (1.25-2.5 mg total) by mouth daily. Adjust dose per INR results as directed     Past Medical History:    Past Medical History:   Diagnosis Date   ? Anticoagulation goal of INR 2 to 3    ? Atrial fibrillation (H)    ? 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 CVA (cerebral infarction)    ? DNR (do not resuscitate) 10/18/2016   ? Former smoker    ? HTN (hypertension)    ? Knee osteoarthritis    ? Onychomycosis    ? Pacemaker    ? Prostate cancer (H)     Treated with radiation brachytherapy. Followed by [facility].     ? Vitamin B12 deficiency    ? Vitiligo            PHYSICAL EXAMINATION  Vitals:    03/04/20 0700   BP: 161/83   Pulse: 61   Resp: 14   Temp: 98.1  F (36.7  C)   SpO2: 97%       Today on physical exam:     GENERAL: Awake, Alert, not in any form of acute distress, answers questions appropriately, follows simple commands, conversant  HEENT: Head is normocephalic with normal hair distribution. No evidence of trauma. Ears: No acute purulent discharge. Eyes: Conjunctivae pink with no scleral jaundice. Nose: Normal mucosa and septum. NECK: Supple with no cervical or supraclavicular lymphadenopathy. Trachea is midline. Glasses, hearing aids, [place]  CHEST: No tenderness or deformity, no crepitus  LUNG: dim to auscultation with good chest expansion. There are no crackles or wheezes, normal AP diameter. No shortness of breath no cough  BACK: chronic lumbar pain but denies today, no radiation, no numb/tingling  CVS: irregularly irregular rhythm, systolic murmur,  2+ pulses symmetric in all extremities.  ABDOMEN: Rounded and soft, nontender to palpation, non distended, no masses, no organomegaly, good bowel sounds, no rebound or guarding, no peritoneal signs.   EXTREMITIES: No pedal edema, darin discoloration ble  SKIN: Warm and dry,   NEUROLOGICAL: The patient is oriented to person, place and time. Forgetful at times, no  numb/tingling ble. Very pleasant, mild flattened affect than baseline, mild depressive symptoms            LABS:   Recent Results (from the past 168 hour(s))   INR   Result Value Ref Range    INR 3.14 (H) 0.90 - 1.10     Results for orders placed or performed during the hospital encounter of 05/07/18   Basic Metabolic Panel   Result Value Ref Range    Sodium 134 (L) 136 - 145 mmol/L    Potassium 4.5 3.5 - 5.0 mmol/L    Chloride 99 98 - 107 mmol/L    CO2 23 22 - 31 mmol/L    Anion Gap, Calculation 12 5 - 18 mmol/L    Glucose 104 70 - 125 mg/dL    Calcium 10.0 8.5 - 10.5 mg/dL    BUN 18 8 - 28 mg/dL    Creatinine 1.12 0.70 - 1.30 mg/dL    GFR MDRD Af Amer >60 >60 mL/min/1.73m2    GFR MDRD Non Af Amer >60 >60 mL/min/1.73m2         Lab Results   Component Value Date    WBC 8.6 05/29/2019    HGB 11.2 (L) 05/29/2019    HCT 35.3 (L) 05/29/2019     05/29/2019     05/29/2019       Lab Results   Component Value Date    [id number] 796 10/02/2018     No results found for: HGBA1C  Lab Results   Component Value Date    INR 3.14 (H) 03/02/2020    INR 2.61 (H) 02/21/2020    INR 4.12 (H) 02/14/2020     No results found for: [id number]  Lab Results   Component Value Date    TSH 2.08 08/04/2015           ASSESSMENT/PLAN:    1. Atrial fibrillation: Rate controlled 60s. No chest pain or palpitations. On metoprolol, warfarin. Anticoagulation clinic manages warfarin.   2. HTN: SBP 160s. On metoprolol. Stable since recent increase in metoprolol.  3. S/p pacemaker: Follows with cardiology, [place]. No recent concerns.   4. Pulmonary hypertension, Dyspnea: No recent shortness of breath. No chest pain or recent concerns. Follows with cardiology prn.   5. Cognitive impairment, vascular dementia:  on namenda 7mg xr. Very slow progressive decline, concern for further decline now that his wife has passed. Monitor closely and provide safe environment. No recent safety concerns or behavioral issues.   6. H/o prostate  cancer: No recent issues. On flomax. Radiation seeding in 2004. Last follow up in December. No concerns today.   7. Lumbar stenosis, chronic back pain: No radiation, no numb/tingling. Tylenol prn, aspercreme prn. Sees chiropractor regularly in facility. No recent pain or concerns.   8. Macular degeneration, intraocular hemorrhage: Follows with Dr. Bill Figueroa. On atropine and prednisolone gtts. No concerns today.   9. H/o CVA: On warfarin, no recent concerns.   10. Constipation: On colace prn and taking prune juice. senna daily prn. Stable recently.   11. Depression, Grief: appears to be coping well today, in good spirits. Appears hygiene is in order today and self cares are still appropriate. Nursing has taken over med management and administration. Doing well so far, appropriately grieving.   Weights: 159lbs today. No recent concerns or changes in appetite.     Due for labs at next visit    Electronically signed by: Delmi Regan NP

## 2021-06-20 NOTE — PROGRESS NOTES
[facility] Progress Note    Assessment:  1.  Chronic atrial fibrillation. Pacemaker interrogation is reviewed today.  We spent some time comparing and contrasting alternative treatment modalities.  He has been on warfarin for a number of years and tolerates warfarin and has been steady on his feet based on her discussion today.  They are going to review some of the data that I gave him today contact me if they have any additional questions.  Laboratory studies from May 2018 are reviewed.  Flex level 0.9.  Historically he has had some higher heart rates atrial fibrillation and has required Toprol and digoxin.  This appears stable at this time.    2.  Hypertension.  Blood pressure appears to be under good control with his current combination of medications.  He does have some mild lower extremity edema which I suspect is related to the amlodipine.  Not make any medication changes today but asked him to monitor blood pressure periodically.      Plan: As outlined above with plan follow-up in 6 months.  1. Pacemaker     2. Chronic atrial fibrillation (H)     3. Anticoagulation goal of INR 2 to 3           An After Visit Summary was printed and given to the patient.    Subjective:    Raymond Zheng is a 89 y.o. male who returned for a planned  follow up visit.  He is here for follow-up of his pacemaker and hypertension.  Number of months ago he was seen in the ER for higher blood pressures.  His wife who monitors his pressure medicines reports that his blood pressure has been under pretty good control although she does not bring in readings for my review today.  He has some underlying short-term issues but this has been stable per her report.  He has not had any falling events, chest discomfort, dizziness or lightheadedness.  He denies any specific symptoms.  He does have some mild lower extremity edema which has been stable and likely related at least in part to the amlodipine.    Spent some time  discussing the coagulation for atrial fibrillation.  We talked about the higher risk of falling and he is 89 years old.  He however has been steady on his feet based on the report.  I compared and contrasted the newer anticoagulant agents as well as the watchman device and gave him a pamphlet.  Device check today demonstrates stable device findings.  He has had some brief episodes of nonsustained ventricular tachycardia.  Echocardiogram from 1 year ago reported normal systolic function.    Review of Systems:                                              Problem List:    Patient Active Problem List   Diagnosis     Prostate cancer - 2002 - [place] - Treated with radiation brachytherapy. Followed by [facility].     Pacemaker     Atrial fibrillation (H)     Vitamin B12 deficiency     Knee osteoarthritis     CVA (cerebral infarction)     HTN (hypertension)     Former smoker     DNR (do not resuscitate)     Anticoagulation goal of INR 2 to 3     Low back pain     Multilevel foraminal stenosis, lumbar       Social History     Social History     Marital status:      Spouse name: Sydni     Number of children: 0     Years of education: N/A     Occupational History      Retired     Social History Main Topics     Smoking status: Former Smoker     Smokeless tobacco: Never Used     Alcohol use 1.5 oz/week     3 Standard drinks or equivalent per week     Drug use: Not on file     Sexual activity: Not on file     Other Topics Concern     Not on file     Social History Narrative    .  Lives in home with wife.        Former .       Family History   Problem Relation Age of Onset     Cancer Mother      Kidney disease Father      Heart disease Brother      Lung cancer Brother      Stroke Sister      Heart disease Sister        Current Outpatient Prescriptions   Medication Sig Dispense Refill     amLODIPine (NORVASC) 5 MG tablet Take 1 tablet (5 mg total) by mouth daily. 90 tablet 3     cyanocobalamin  "(VITAMIN B-12) 1000 MCG tablet Take 1,000 mcg by mouth daily.       diclofenac sodium (VOLTAREN) 1 % Gel Apply 1 application topically 3 (three) times a day. Each application should be 2-4 grams. 1 Tube 3     digoxin (LANOXIN) 125 mcg tablet TAKE ONE TABLET BY MOUTH DAILY 90 tablet 2     FOLIC ACID/MV,FE,MIN (CENTRUM ORAL) Take 1 tablet by mouth daily.        gabapentin (NEURONTIN) 100 MG capsule Take 100 mg by mouth 3 (three) times a day. 90 capsule 3     metoprolol succinate (TOPROL XL) 50 MG 24 hr tablet Take 1 tablet (50 mg total) by mouth daily. 90 tablet 3     nitroglycerin (NITROSTAT) 0.4 MG SL tablet Place 0.4 mg under the tongue as needed for chest pain.       tamsulosin (FLOMAX) 0.4 mg Cp24 Take 1 capsule (0.4 mg total) by mouth daily. 90 capsule 3     warfarin (COUMADIN) 2.5 MG tablet Take 0.5-1 tablets (1.25-2.5 mg total) by mouth daily. Adjust dose per INR results as directed 75 tablet 1     No current facility-administered medications for this visit.        Objective:     /60 (Patient Site: Left Arm, Patient Position: Sitting, Cuff Size: Adult Regular)  Pulse 61  Resp 12  Ht 5' 9\" (1.753 m)  Wt 159 lb (72.1 kg)  BMI 23.48 kg/m2  159 lb (72.1 kg)       Physical Exam:    GENERAL APPEARANCE: alert, no apparent distress  HEENT: no scleral icterus or xanthelasma  NECK: jugular venous pressure within normal limits  CHEST: symmetric, the lungs are clear to auscultation, pacemaker site well-healed  CARDIOVASCULAR: Irregular rhythm, soft systolic murmur; no carotid bruits  Abdomen: No Organomegaly, masses, bruits, or tenderness. Bowels sounds are present      EXTREMITIES: no cyanosis, clubbing, 1+ edema    Cardiac Testing:  Patient Information      Patient Name MRN Sex              Age     FuRaymond back [id number] Male 1925 (89 y.o.)       Indications      Dx: A-fib (H) [I48.91 (ICD-10-CM)]       Summary        Left ventricle ejection fraction is normal. The calculated left ventricular " ejection fraction is 66%.    Moderate right atrial and mild right ventricular enlargement. Right ventricular systolic function is decreased.    Moderate tricuspid regurgitation with mild elevation in pulmonary artery pressures.    When compared to the previous study dated 11/19/2012, the right heart chamber sizes have increased in size. There is now evidence of mild pulmonary hypertension.           Lab Results:    Lab Results   Component Value Date     (L) 05/07/2018    K 4.5 05/07/2018    CL 99 05/07/2018    CO2 23 05/07/2018    BUN 18 05/07/2018    CREATININE 1.12 05/07/2018    CALCIUM 10.0 05/07/2018     Lab Results   Component Value Date    CHOL 148 06/28/2011    TRIG 46 06/28/2011    HDL 64 06/28/2011     BNP (pg/mL)   Date Value   11/29/2013 314 (H)     Creatinine (mg/dL)   Date Value   05/07/2018 1.12   11/07/2017 0.84   08/25/2017 1.01   12/29/2016 1.22     LDL Calculated (mg/dL)   Date Value   06/28/2011 74.8   02/09/2010 96.6     Lab Results   Component Value Date    WBC 9.8 05/07/2018    WBC 8.6 08/04/2015    HGB 12.4 (L) 05/07/2018    HCT 36.0 (L) 05/07/2018    MCV 94 05/07/2018     05/07/2018               This note has been dictated using voice recognition software. Any grammatical or context distortions are unintentional and inherent to the software.      William Corbett M.D., F.A.C.C.  [facility]  [phone]

## 2021-06-20 NOTE — LETTER
Letter by Lily Jung RDCS at      Author: Lily Jung RDCS Service: -- Author Type: --    Filed:  Encounter Date: 8/19/2020 Status: (Other)         Raymond Zheng  [address]  [place] [address]      August 19, 2020      Dear Mr. Zheng,    RE: Remote Results    We are writing to you regarding your recent Remote Pacemaker check from home. Your transmission was received successfully. Battery status is satisfactory at this time.     Your results are being reviewed.  You will be contacted if further information is necessary.     Your next device appointment will be a remote check on November 18, 2020; this will occur automatically.    To schedule or reschedule, please call [phone] and press 1.    NOTE: If you would like to do an extra transmission, please call [phone] and press 3 to speak to a nurse BEFORE transmitting. This ensures that the Device Clinic staff is aware of the reason you are sending a transmission, and can follow-up with you after it has been reviewed.    We will be checking your implanted device from home (remotely) every three months unless otherwise instructed. We will need to see you in the clinic at least once a year. You may need to be seen in the clinic sooner depending on the results of your check.    Please be aware:    The follow-up schedule is like a Physician prescription.    Your remote monitor is paired to your specific implanted device.      Sincerely,    [facility]

## 2021-06-20 NOTE — PROGRESS NOTES
In clinic device check with Dr. Corbett.  Please see link for full device report.  Patient was informed of results and next follow up during today's visit.

## 2021-06-21 NOTE — PROGRESS NOTES
Internal Medicine Office Visit  [facility]  Patient Name: Raymond Zheng  Patient Age: 89 y.o.  YOB: 1925  MRN: [id number]    Date of Visit: 2018  Reason for Office Visit:   Chief Complaint   Patient presents with     Leg Swelling     more so on the Rt side           Assessment / Plan / Medical Decision Makin. Bilateral lower extremity edema  2. HTN (hypertension)  - Chart reviewed, LE edema has been an ongoing concern. His weight has increased over the past 1 month Will reduce amlodipine to 2.5 mg daily and have him monitor BP at home. See Dr. Marie back again in 3 weeks for BP and edema recheck.   - amLODIPine (NORVASC) 5 MG tablet; Take 0.5 tablets (2.5 mg total) by mouth daily.  Dispense: 45 tablet; Refill: 3        Health Maintenance Review  Health Maintenance   Topic Date Due     ZOSTER VACCINES (2 of 3) 2009     TD 18+ HE  2019     FALL RISK ASSESSMENT  10/02/2019     ADVANCE DIRECTIVES DISCUSSED WITH PATIENT  10/02/2023     PNEUMOCOCCAL POLYSACCHARIDE VACCINE AGE 65 AND OVER  Completed     INFLUENZA VACCINE RULE BASED  Completed     PNEUMOCOCCAL CONJUGATE VACCINE FOR ADULTS (PCV13 OR PREVNAR)  Discontinued         I have changed Raymond Zheng's amLODIPine. I am also having him maintain his nitroglycerin, cyanocobalamin, (FOLIC ACID/MV,FE,MIN (CENTRUM ORAL)), metoprolol succinate, digoxin, tamsulosin, and warfarin.      HPI:  Raymond Zheng is a 89 y.o. year old who presents to the office today with his wife for bilateral LE edema. This has been an ongoing concern, however, it seems worse in the past month. Legs are not painful, just feel tight. He denies dyspnea, orthopnea, chest pain.     Review of Systems- pertinent positive in bold:  Negative for chest pain, cough       Current Scheduled Meds:  Outpatient Encounter Medications as of 2018   Medication Sig Dispense Refill     amLODIPine (NORVASC) 5 MG tablet Take 0.5 tablets (2.5  mg total) by mouth daily. 45 tablet 3     cyanocobalamin (VITAMIN B-12) 1000 MCG tablet Take 1,000 mcg by mouth daily.       digoxin (LANOXIN) 125 mcg tablet TAKE ONE TABLET BY MOUTH DAILY 90 tablet 2     FOLIC ACID/MV,FE,MIN (CENTRUM ORAL) Take 1 tablet by mouth daily.        metoprolol succinate (TOPROL XL) 50 MG 24 hr tablet Take 1 tablet (50 mg total) by mouth daily. 90 tablet 3     nitroglycerin (NITROSTAT) 0.4 MG SL tablet Place 0.4 mg under the tongue as needed for chest pain.       tamsulosin (FLOMAX) 0.4 mg Cp24 Take 1 capsule (0.4 mg total) by mouth daily. 90 capsule 3     warfarin (COUMADIN) 2.5 MG tablet Take 0.5-1 tablets (1.25-2.5 mg total) by mouth daily. Adjust dose per INR results as directed 75 tablet 1     [DISCONTINUED] amLODIPine (NORVASC) 5 MG tablet Take 1 tablet (5 mg total) by mouth daily. 90 tablet 3     No facility-administered encounter medications on file as of 11/21/2018.      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 Followed by [facility].       Vitamin B12 deficiency      Vitiligo      Past Surgical History:   Procedure Laterality Date     APPENDECTOMY            HERNIA REPAIR            LAMINECTOMY            PARTIAL GASTRECTOMY            Social History     Tobacco Use     Smoking status: Former Smoker     Smokeless tobacco: Never Used   Substance Use Topics     Alcohol use: Yes     Alcohol/week: 1.5 oz     Types: 3 Standard drinks or equivalent per week     Drug use: Not on file       Objective / Physical Examination:  Vitals:    11/21/18 0956   BP: 120/66   Pulse: 84   Temp: 98.1  F (36.7  C)   TempSrc: Temporal    Weight: 169 lb (76.7 kg)     Wt Readings from Last 3 Encounters:   11/21/18 169 lb (76.7 kg)   10/02/18 161 lb 3.2 oz (73.1 kg)   09/28/18 159 lb (72.1 kg)     Body mass index is 24.78 kg/m .     General Appearance: Cooperative, affect appropriate, speech clear, in no apparent distress  Lungs: Clear to auscultation bilaterally. Normal inspiratory and expiratory effort  Cardiovascular: Regular rate, normal S1, S2  Extremities: Pulses 2+ and equal throughout. 2+ pretibial and dorsal bilateral edema.    Integumentary: Warm and dry. No redness/warmth in LE      No orders of the defined types were placed in this encounter.  Followup: No Follow-up on file. earlier if needed.      Cathryn Barkley, CNP

## 2021-06-21 NOTE — LETTER
Letter by Delmi Regan NP at      Author: Delmi Regan NP Service: -- Author Type: --    Filed:  Encounter Date: 2020 Status: (Other)         Patient: Raymond Zheng   MR Number: [id number]   YOB: 1925   Date of Visit: 2020                 [facility]    DATE: 2020    NAME:  Raymond Zheng             :  1925  MRN: [id number]  CODE STATUS:  POLST on file    VISIT TYPE: Review Of Multiple Medical Conditions (chf)     FACILITY:  [facility] [[id number]]       CHIEF COMPLAIN/REASON FOR VISIT:    Chief Complaint   Patient presents with   ? Review Of Multiple Medical Conditions     chf               HISTORY OF PRESENT ILLNESS: Raymond Zheng is a 89 y.o. male who is a resident of [facility]. He has PMH of A fib, CVA, prostate cancer, HTN, knee osteoarthritis, low back pain, lumbar stenosis, pacemaker, vit b12 deficiency, memory issues, mild pulmonary HTN, dependent edema, dyspnea.     Today Mr. Zheng is seen for review of systems for CHF. He is seen sitting in chair alone today. He says he has been doing well. He denies any shortness of breath and thinks his legs are not swollen today. He is not having any dizziness or recent falls. He says he is not having any pain today. He says he sleeps well and his appetite is good. He is not having any other recent concerns. Per staff weights have been stable at 153lbs. He has not appeared to be short of breath recently. He ambulates around the facility with his walker. He is independent for ADLs. He has not had any leg swelling recently.     REVIEW OF SYSTEMS:  PROBLEMS AND REVIEW OF SYSTEMS:   Review of Systems  Today on ROS:   Currently, no fever, chills, or rigors. Decreased vision and hearing. Denies any chest pain, headaches, palpitations, lightheadedness, dizziness. Appetite is good. Denies any GERD symptoms. Denies any difficulty with swallowing, nausea, or vomiting.  Denies  any abdominal pain, diarrhea or constipation. Denies any urinary symptoms. No insomnia.  Positive for forgetfulness, ambulates with walker, no recent back pain, nursing manages and administers meds, confusion, no recent shortness of breath, no leg swelling today, unable to obtain further ROS due to cognition      Allergies   Allergen Reactions   ? Ciprofloxacin    ? Erythromycin Base    ? Oxycodone-Acetaminophen    ? Sulfa (Sulfonamide Antibiotics)      Current Outpatient Medications   Medication Sig   ? acetaminophen (TYLENOL) 500 MG tablet Take 1,000 mg by mouth 3 (three) times a day as needed for pain.    ? furosemide (LASIX) 20 MG tablet 1 TAB BY MOUTH DAILY (DX: CHF)   ? furosemide (LASIX) 40 MG tablet 1 TAB BY MOUTH DAILY (DX: EDEMA) (Patient taking differently: Take 20 mg by mouth daily. )   ? levothyroxine (SYNTHROID, LEVOTHROID) 25 MCG tablet Take 1 tablet (25 mcg total) by mouth daily.   ? memantine (NAMENDA XR) 7 mg CSpX 1 CAP BY MOUTH EVERY BEDTIME (DX:MEMORY LOSS)   ? metoprolol succinate (TOPROL-XL) 50 MG 24 hr tablet TAKE 1.5 TABS (75 MG) ORALLY ONCE DAILY   ? nitroglycerin (NITROSTAT) 0.4 MG SL tablet Place 0.4 mg under the tongue as needed for chest pain.   ? polyethylene glycol (MIRALAX) 17 gram packet Take 17 g by mouth daily as needed.   ? potassium chloride (K-DUR,KLOR-CON) 10 MEQ tablet 1 TAB BY MOUTH DAILY (DX:HYPOKALEMIA)   ? potassium chloride (K-DUR,KLOR-CON) 20 MEQ tablet 1 TAB BY MOUTH DAILY (DX: HYPOKALEMIA) (Patient taking differently: Take 10 mEq by mouth daily. )   ? SENEXON-S 8.6-50 mg tablet 2 TABS BY MOUTH EVERY BEDTIME   ? trolamine salicylate (ASPERCREME) 10 % cream Apply 1 application topically every 6 (six) hours as needed.          ? warfarin (COUMADIN) 2.5 MG tablet Take 0.5-1 tablets (1.25-2.5 mg total) by mouth daily. Adjust dose per INR results as directed     Past Medical History:    Past Medical History:   Diagnosis Date   ? Anticoagulation goal of INR 2 to 3    ? Atrial  fibrillation (H)    ? 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 CVA (cerebral infarction)    ? DNR (do not resuscitate) 10/18/2016   ? Former smoker    ? HTN (hypertension)    ? Knee osteoarthritis    ? Onychomycosis    ? Pacemaker    ? Prostate cancer (H)     Treated with radiation brachytherapy. Followed by [facility].     ? Vitamin B12 deficiency    ? Vitiligo            PHYSICAL EXAMINATION  Vitals:    11/25/20 1049   Weight: 153 lb (69.4 kg)       Today on physical exam:     GENERAL: Awake, Alert, not in any form of acute distress, answers questions appropriately, follows simple commands, conversant, pleasant  HEENT: Head is normocephalic with normal hair distribution. No evidence of trauma. Ears: No acute purulent discharge. Eyes: Conjunctivae pink with no scleral jaundice. Nose: Normal mucosa and septum. NECK: Supple with no cervical or supraclavicular lymphadenopathy. Trachea is midline. Glasses, hearing aids, [place]  CHEST: No tenderness or deformity, no crepitus  LUNG: dim but clear to bilateral bases to auscultation with good chest expansion. There are no crackles or wheezes, normal AP diameter. No cough noted, no shortness of breath   BACK: no lumbar pain, no radiation, no numb/tingling, moderate kyphosis  CVS: irregularly irregular rhythm, systolic murmur,  2+ pulses symmetric in all extremities.  ABDOMEN: Rounded and soft, nontender to palpation, non distended, no masses, no organomegaly, good bowel sounds, no rebound or guarding, no peritoneal signs.   EXTREMITIES: darin discoloration ble, no ble edema  SKIN: Warm and dry,   NEUROLOGICAL: The patient is oriented to person, place and time. Confused at times Forgetful at times, no numb/tingling ble.  Pleasantly confused            LABS:   Recent Results (from the past 168 hour(s))   Thyroid Stimulating Hormone (TSH)   Result Value Ref Range    TSH 4.46 0.30 - 5.00 uIU/mL     Results for orders placed or performed in visit on 07/01/20    Basic Metabolic Panel   Result Value Ref Range    Sodium 138 136 - 145 mmol/L    Potassium 4.0 3.5 - 5.0 mmol/L    Chloride 103 98 - 107 mmol/L    CO2 26 22 - 31 mmol/L    Anion Gap, Calculation 9 5 - 18 mmol/L    Glucose 69 (L) 70 - 125 mg/dL    Calcium 9.6 8.5 - 10.5 mg/dL    BUN 35 (H) 8 - 28 mg/dL    Creatinine 1.43 (H) 0.70 - 1.30 mg/dL    GFR MDRD Af Amer 56 (L) >60 mL/min/1.73m2    GFR MDRD Non Af Amer 46 (L) >60 mL/min/1.73m2         Lab Results   Component Value Date    WBC 9.2 09/16/2020    HGB 11.2 (L) 09/16/2020    HCT 36.1 (L) 09/16/2020     (H) 09/16/2020     09/16/2020       Lab Results   Component Value Date    [id number] 796 10/02/2018     Lab Results   Component Value Date    HGBA1C 5.8 (H) 09/16/2020     Lab Results   Component Value Date    INR 2.57 (H) 11/04/2020    INR 2.64 (H) 10/07/2020    INR 2.43 (H) 09/09/2020     No results found for: [id number]  Lab Results   Component Value Date    TSH 4.46 11/25/2020           ASSESSMENT/PLAN:    Chronic CHF, pulmonary hypertension: no ble edema, no shortness of breath recently, weekly [id number]. Echo showed diastolic dysfunction, mild pulmonary htn, EF 60-65%, severe LA and RA dilation. On lasix 20mg daily, kcl to 10meq daily. No recent concerns. Appears stable today.   Atrial fibrillation:. No chest pain or palpitations. On metoprolol, warfarin. Anticoagulation clinic manages warfarin. INR 11/4 2.57. stable.   HTN: SBP 130s. On metoprolol. No recent concerns.   S/p pacemaker: Follows with cardiology, [employer]. No recent concerns. Last device check 11/18.   Cognitive impairment, vascular dementia:  on namenda, slow progressive decline. Continue to provide safe environment. No recent concerns.   H/o prostate cancer: Radiation seeding in 2004. No recent follow ups, stable at this time.   Lumbar stenosis, chronic back pain: Tylenol prn, aspercreme prn. No recent complaints of pain. Doing very well.   Macular  degeneration, intraocular hemorrhage: Follows with Dr. Bill Figueroa. On atropine and prednisolone gtts. No recent concerns.   H/o CVA: On warfarin, no recent concerns.   Constipation: On colace prn, prune juice. senna daily prn. Stable recently.   CKD stage 3: cr 1.24, GFR 50 on 6/24. Cr 1.43 on 7/1. Cr 1.32 gfr 51 on 9/16.     Electronically signed by: Delmi Regan NP

## 2021-06-21 NOTE — LETTER
Letter by Delmi Regan NP at      Author: Delmi Regan NP Service: -- Author Type: --    Filed:  Encounter Date: 2020 Status: (Other)         Patient: Raymond Zheng   MR Number: [id number]   YOB: 1925   Date of Visit: 2020                 [facility]    DATE: 2020    NAME:  Raymond Zheng             :  1925  MRN: [id number]  CODE STATUS:  POLST on file    VISIT TYPE: Review Of Multiple Medical Conditions (chf, hypertension, cognitive impairment)     FACILITY:  [facility] [[id number]]       CHIEF COMPLAIN/REASON FOR VISIT:    Chief Complaint   Patient presents with   ? Review Of Multiple Medical Conditions     chf, hypertension, cognitive impairment               HISTORY OF PRESENT ILLNESS: Raymond Zheng is a 89 y.o. male who is a resident of [facility]. He has PMH of A fib, CVA, prostate cancer, HTN, knee osteoarthritis, low back pain, lumbar stenosis, pacemaker, vit b12 deficiency, memory issues, mild pulmonary HTN, dependent edema, dyspnea.     Today Mr. Zheng is seen for review of systems for CHF, hypertension, cognitive impairment. He is seen sitting in chair today. He has been ambulating around the facility with his walker. He denies any shortness of breath today and thinks his legs have not been very swollen. He says he has been feeling good. He is not having any dizziness and does not recall falling at all recently. He says he has not had any recent headaches or visual changes. He sleeps well and appetite is good. He denies any other issues recently. Nursing reports his cognition does appear to be slowly declining. He is overall doing pretty well though and weights are stable at 153lbs. He has not had any recent concerns.     REVIEW OF SYSTEMS:  PROBLEMS AND REVIEW OF SYSTEMS:   Review of Systems  Today on ROS:   Currently, no fever, chills, or rigors. Decreased vision and hearing. Denies any chest pain,  headaches, palpitations, lightheadedness, dizziness. Appetite is good. Denies any GERD symptoms. Denies any difficulty with swallowing, nausea, or vomiting.  Denies any abdominal pain, diarrhea or constipation. Denies any urinary symptoms. No insomnia.  Positive for forgetfulness, ambulates with walker, no recent back pain, nursing manages and administers meds, confusion, no recent shortness of breath, no leg swelling today, unable to obtain further ROS due to cognition      Allergies   Allergen Reactions   ? Ciprofloxacin    ? Erythromycin Base    ? Oxycodone-Acetaminophen    ? Sulfa (Sulfonamide Antibiotics)      Current Outpatient Medications   Medication Sig   ? acetaminophen (TYLENOL) 500 MG tablet Take 1,000 mg by mouth 3 (three) times a day as needed for pain.    ? furosemide (LASIX) 20 MG tablet 1 TAB BY MOUTH DAILY (DX: CHF)   ? furosemide (LASIX) 40 MG tablet 1 TAB BY MOUTH DAILY (DX: EDEMA) (Patient taking differently: Take 20 mg by mouth daily. )   ? levothyroxine (SYNTHROID, LEVOTHROID) 25 MCG tablet Take 1 tablet (25 mcg total) by mouth daily.   ? memantine (NAMENDA XR) 7 mg CSpX 1 CAP BY MOUTH EVERY BEDTIME (DX:MEMORY LOSS)   ? metoprolol succinate (TOPROL-XL) 50 MG 24 hr tablet TAKE 1.5 TABS (75 MG) ORALLY ONCE DAILY   ? nitroglycerin (NITROSTAT) 0.4 MG SL tablet Place 0.4 mg under the tongue as needed for chest pain.   ? polyethylene glycol (MIRALAX) 17 gram packet Take 17 g by mouth daily as needed.   ? potassium chloride (K-DUR,KLOR-CON) 10 MEQ tablet 1 TAB BY MOUTH DAILY (DX:HYPOKALEMIA)   ? potassium chloride (K-DUR,KLOR-CON) 20 MEQ tablet 1 TAB BY MOUTH DAILY (DX: HYPOKALEMIA) (Patient taking differently: Take 10 mEq by mouth daily. )   ? SENEXON-S 8.6-50 mg tablet 2 TABS BY MOUTH EVERY BEDTIME   ? trolamine salicylate (ASPERCREME) 10 % cream Apply 1 application topically every 6 (six) hours as needed.          ? warfarin (COUMADIN) 2.5 MG tablet Take 0.5-1 tablets (1.25-2.5 mg total) by mouth  daily. Adjust dose per INR results as directed     Past Medical History:    Past Medical History:   Diagnosis Date   ? Anticoagulation goal of INR 2 to 3    ? Atrial fibrillation (H)    ? 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 CVA (cerebral infarction)    ? DNR (do not resuscitate) 10/18/2016   ? Former smoker    ? HTN (hypertension)    ? Knee osteoarthritis    ? Onychomycosis    ? Pacemaker    ? Prostate cancer (H)     Treated with radiation brachytherapy. Followed by [facility].     ? Vitamin B12 deficiency    ? Vitiligo            PHYSICAL EXAMINATION  Vitals:    12/02/20 0700   Weight: 153 lb (69.4 kg)       Today on physical exam:     GENERAL: Awake, Alert, not in any form of acute distress, answers questions appropriately, follows simple commands, conversant, pleasant  HEENT: Head is normocephalic with normal hair distribution. No evidence of trauma. Ears: No acute purulent discharge. Eyes: Conjunctivae pink with no scleral jaundice. Nose: Normal mucosa and septum. NECK: Supple with no cervical or supraclavicular lymphadenopathy. Trachea is midline. Glasses, hearing aids, [place]  CHEST: No tenderness or deformity, no crepitus  LUNG: dim but clear to bilateral bases to auscultation with good chest expansion. There are no crackles or wheezes, normal AP diameter. No cough noted, no shortness of breath   BACK: no lumbar pain, no radiation, no numb/tingling, moderate kyphosis  CVS: irregularly irregular rhythm, systolic murmur,  2+ pulses symmetric in all extremities.  ABDOMEN: Rounded and soft, nontender to palpation, non distended, no masses, no organomegaly, good bowel sounds, no rebound or guarding, no peritoneal signs.   EXTREMITIES: darin discoloration ble, no ble edema  SKIN: Warm and dry,   NEUROLOGICAL: The patient is oriented to person, place and time. Confused at times Forgetful at times, no numb/tingling ble.  Pleasantly confused            LABS:   Recent Results (from the past 168  hour(s))   INR   Result Value Ref Range    INR 2.35 (H) 0.90 - 1.10     Results for orders placed or performed in visit on 07/01/20   Basic Metabolic Panel   Result Value Ref Range    Sodium 138 136 - 145 mmol/L    Potassium 4.0 3.5 - 5.0 mmol/L    Chloride 103 98 - 107 mmol/L    CO2 26 22 - 31 mmol/L    Anion Gap, Calculation 9 5 - 18 mmol/L    Glucose 69 (L) 70 - 125 mg/dL    Calcium 9.6 8.5 - 10.5 mg/dL    BUN 35 (H) 8 - 28 mg/dL    Creatinine 1.43 (H) 0.70 - 1.30 mg/dL    GFR MDRD Af Amer 56 (L) >60 mL/min/1.73m2    GFR MDRD Non Af Amer 46 (L) >60 mL/min/1.73m2         Lab Results   Component Value Date    WBC 9.2 09/16/2020    HGB 11.2 (L) 09/16/2020    HCT 36.1 (L) 09/16/2020     (H) 09/16/2020     09/16/2020       Lab Results   Component Value Date    [id number] 796 10/02/2018     Lab Results   Component Value Date    HGBA1C 5.8 (H) 09/16/2020     Lab Results   Component Value Date    INR 2.35 (H) 12/02/2020    INR 2.57 (H) 11/04/2020    INR 2.64 (H) 10/07/2020     No results found for: [id number]  Lab Results   Component Value Date    TSH 4.46 11/25/2020           ASSESSMENT/PLAN:    Chronic CHF, pulmonary hypertension: no ble edema, no shortness of breath recently, weekly [id number]. Echo showed diastolic dysfunction, mild pulmonary htn, EF 60-65%, severe LA and RA dilation. On lasix 20mg daily, kcl to 10meq daily. Weights appear to continue to be stable. No recent signs of fluid overload or exacerbation. Will continue current med regimen.   Atrial fibrillation:. No chest pain or palpitations. On metoprolol, warfarin. Anticoagulation clinic manages warfarin. INR has been stable.   HTN: SBP 140s. On metoprolol. No recent concerns.   S/p pacemaker: Follows with cardiology, [facility]. Last device check 11/18.   Cognitive impairment, vascular dementia:  on namenda, slow progressive decline. Continue to provide safe environment. No recent concerns.   H/o prostate cancer:  Radiation seeding in 2004. No recent follow ups, stable at this time.   Lumbar stenosis, chronic back pain: Tylenol prn, aspercreme prn. No recent complaints of pain. No recent concerns.   Macular degeneration, intraocular hemorrhage: Follows with Dr. Bill Figueroa. On atropine and prednisolone gtts. No recent concerns.   H/o CVA: On warfarin, no recent concerns.   Constipation: On colace prn, prune juice. senna daily prn. Stable recently.   CKD stage 3: cr 1.24, GFR 50 on 6/24. Cr 1.43 on 7/1. Cr 1.32 gfr 51 on 9/16.     Electronically signed by: Delmi Regan NP

## 2021-06-21 NOTE — LETTER
Letter by Delmi Regan NP at      Author: Delmi Regan NP Service: -- Author Type: --    Filed:  Encounter Date: 2021 Status: (Other)         [address] [address]                                  2021    Patient: Raymond Zheng   MR Number: [id number]   YOB: 1925   Date of Visit: 2021     Dear Dr. Angeles:    Thank you for referring Raymond Zheng to me for evaluation. Below are the relevant portions of my assessment and plan of care.    If you have questions, please do not hesitate to call me. I look forward to following Raymond along with you.    Sincerely,        Delmi Regan NP          CC  No Recipients  Delmi Regan NP  2021  2:53 PM  Signed              [facility]    DATE: 2021    NAME:  Raymond Zheng             :  1925  MRN: [id number]  CODE STATUS:  POLST on file    VISIT TYPE: Problem Visit (positive covid)     FACILITY:  [facility] [[id number]]       CHIEF COMPLAIN/REASON FOR VISIT:    Chief Complaint   Patient presents with   ? Problem Visit     positive covid               HISTORY OF PRESENT ILLNESS: Raymond Zheng is a 89 y.o. male who is a resident of [facility]. He has PMH of A fib, CVA, prostate cancer, HTN, knee osteoarthritis, low back pain, lumbar stenosis, pacemaker, vit b12 deficiency, memory issues, mild pulmonary HTN, dependent edema, dyspnea.     Today Mr. Zheng is seen for concerns of being positive for COVID 19. He is currently being isolated in his apartment. Nursing reports they believe he has been doing well and has not had symptoms. He is seen in his apartment today. He is dressed and appears well kempt. He denies any shortness of breath or cough today. He has not had a sore throat or runny nose. He denies any loose stools. He says he has been feeling good and denies any fevers, chills, dizziness, nausea, or  other issues. He denies feeling fatigued or weaker than usual. On review of chart he has been afebrile and o2 sats have been stable. Nursing has been checking his temp and o2 sats twice daily.     REVIEW OF SYSTEMS:  PROBLEMS AND REVIEW OF SYSTEMS:   Review of Systems  Today on ROS:   Currently, no fever, chills, or rigors. Decreased vision and hearing. Denies any chest pain, headaches, palpitations, lightheadedness, dizziness. Appetite is good. Denies any GERD symptoms. Denies any difficulty with swallowing, nausea, or vomiting.  Denies any abdominal pain, diarrhea or constipation. Denies any urinary symptoms. No insomnia.  Positive for forgetfulness, ambulates with walker, no recent back pain, nursing manages and administers meds, no shortness of breath, minimal leg swelling, no cough, unable to obtain further ROS due to cognition      Allergies   Allergen Reactions   ? Ciprofloxacin    ? Erythromycin Base    ? Oxycodone-Acetaminophen    ? Sulfa (Sulfonamide Antibiotics)      Current Outpatient Medications   Medication Sig   ? acetaminophen (TYLENOL) 500 MG tablet Take 1,000 mg by mouth 3 (three) times a day as needed for pain.    ? furosemide (LASIX) 20 MG tablet 1 TAB BY MOUTH DAILY (DX: CHF)   ? furosemide (LASIX) 40 MG tablet 1 TAB BY MOUTH DAILY (DX: EDEMA) (Patient taking differently: Take 20 mg by mouth daily. )   ? levothyroxine (SYNTHROID, LEVOTHROID) 25 MCG tablet Take 1 tablet (25 mcg total) by mouth daily.   ? memantine (NAMENDA XR) 7 mg CSpX 1 CAP BY MOUTH EVERY BEDTIME (DX:MEMORY LOSS)   ? metoprolol succinate (TOPROL-XL) 50 MG 24 hr tablet TAKE 1.5 TABS (75 MG) ORALLY ONCE DAILY   ? nitroglycerin (NITROSTAT) 0.4 MG SL tablet Place 0.4 mg under the tongue as needed for chest pain.   ? polyethylene glycol (MIRALAX) 17 gram packet Take 17 g by mouth daily as needed.   ? potassium chloride (K-DUR,KLOR-CON) 10 MEQ tablet 1 TAB BY MOUTH DAILY (DX:HYPOKALEMIA)   ? potassium chloride (K-DUR,KLOR-CON) 20 MEQ  tablet 1 TAB BY MOUTH DAILY (DX: HYPOKALEMIA) (Patient taking differently: Take 10 mEq by mouth daily. )   ? SENEXON-S 8.6-50 mg tablet 2 TABS BY MOUTH EVERY BEDTIME   ? trolamine salicylate (ASPERCREME) 10 % cream Apply 1 application topically every 6 (six) hours as needed.          ? warfarin (COUMADIN) 2.5 MG tablet Take 0.5-1 tablets (1.25-2.5 mg total) by mouth daily. Adjust dose per INR results as directed     Past Medical History:    Past Medical History:   Diagnosis Date   ? Anticoagulation goal of INR 2 to 3    ? Atrial fibrillation (H)    ? 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 CVA (cerebral infarction)    ? DNR (do not resuscitate) 10/18/2016   ? Former smoker    ? HTN (hypertension)    ? Knee osteoarthritis    ? Onychomycosis    ? Pacemaker    ? Prostate cancer (H)     Treated with radiation brachytherapy. Followed by [facility].     ? Vitamin B12 deficiency    ? Vitiligo            PHYSICAL EXAMINATION  Vitals:    01/08/21 0824   Temp: 97.4  F (36.3  C)   SpO2: 99%       Today on physical exam:     GENERAL: Awake, Alert, not in any form of acute distress, answers questions appropriately, follows simple commands, conversant, pleasant  HEENT: Head is normocephalic with normal hair distribution. No evidence of trauma. Ears: No acute purulent discharge. Eyes: Conjunctivae pink with no scleral jaundice. Nose: Normal mucosa and septum. NECK: Supple with no cervical or supraclavicular lymphadenopathy. Trachea is midline. Glasses, hearing aids, [place]  CHEST: No tenderness or deformity, no crepitus  LUNG: dim but clear to bilateral bases to auscultation with good chest expansion. There are no crackles or wheezes, normal AP diameter. No cough noted, no shortness of breath, unlabored breathing  BACK: no lumbar pain, no radiation, no numb/tingling, moderate kyphosis  CVS: irregularly irregular rhythm, systolic murmur,  2+ pulses symmetric in all extremities.  ABDOMEN: Rounded and soft,  nontender to palpation, non distended, no masses, no organomegaly, good bowel sounds, no rebound or guarding, no peritoneal signs.   EXTREMITIES: darin discoloration ble, trace ble edema  SKIN: Warm and dry,   NEUROLOGICAL: The patient is oriented to person, place. Forgetful, no numb/tingling ble.  Pleasantly confused            LABS:   Recent Results (from the past 168 hour(s))   INR   Result Value Ref Range    INR 2.79 (H) 0.90 - 1.10     Results for orders placed or performed in visit on 07/01/20   Basic Metabolic Panel   Result Value Ref Range    Sodium 138 136 - 145 mmol/L    Potassium 4.0 3.5 - 5.0 mmol/L    Chloride 103 98 - 107 mmol/L    CO2 26 22 - 31 mmol/L    Anion Gap, Calculation 9 5 - 18 mmol/L    Glucose 69 (L) 70 - 125 mg/dL    Calcium 9.6 8.5 - 10.5 mg/dL    BUN 35 (H) 8 - 28 mg/dL    Creatinine 1.43 (H) 0.70 - 1.30 mg/dL    GFR MDRD Af Amer 56 (L) >60 mL/min/1.73m2    GFR MDRD Non Af Amer 46 (L) >60 mL/min/1.73m2         Lab Results   Component Value Date    WBC 9.2 09/16/2020    HGB 11.2 (L) 09/16/2020    HCT 36.1 (L) 09/16/2020     (H) 09/16/2020     09/16/2020       Lab Results   Component Value Date    [id number] 10/02/2018     Lab Results   Component Value Date    HGBA1C 5.8 (H) 09/16/2020     Lab Results   Component Value Date    INR 2.79 (H) 01/06/2021    INR 1.75 (H) 12/30/2020    INR 2.35 (H) 12/02/2020     No results found for: [id number]  Lab Results   Component Value Date    TSH 4.46 11/25/2020           ASSESSMENT/PLAN:    COVID 19 infection: Denies symptoms today, appears well on exam. Per staff no symptoms or concerns. In isolation per [facility] guidelines. Will order zinc, vitamin c, vitamin d daily x 14 days. Will order proair prn x 14 days. Notify if develops further symptoms or concerns. No need for further anticoagulation for VTE risk as is on warfarin. Continue monitoring o2 sats and temps. Does not qualify for any other treatments at this time.   Chronic CHF,  pulmonary hypertension: no ble edema, no shortness of breath recently, weekly [id number]. Echo showed diastolic dysfunction, mild pulmonary htn, EF 60-65%, severe LA and RA dilation. On lasix 20mg daily, kcl to 10meq daily. No recent concerns or signs of fluid overload. Trace edema today. Appears stable on current regimen.   Atrial fibrillation:. No chest pain or palpitations. On metoprolol, warfarin. Anticoagulation clinic manages warfarin. INR 1/6 2.79. stable.   HTN: SBP 130s. On metoprolol. No recent concerns.   S/p pacemaker: Follows with cardiology, [email or web address]. Last device check 11/18. Stable.   Cognitive impairment, vascular dementia:  on namenda, slow progressive decline. Appears stable today. No recent concerns.   H/o prostate cancer: Radiation seeding in 2004. No recent follow ups, stable at this time.   Lumbar stenosis, chronic back pain: Tylenol prn, aspercreme prn. No recent complaints of pain. No recent concerns.   Macular degeneration, intraocular hemorrhage: Follows with Dr. Bill Figueroa. On atropine and prednisolone gtts. Stable at this time.   H/o CVA: On warfarin, no recent concerns.   Constipation: On colace prn, prune juice. senna daily prn. Denies concerns recently.    CKD stage 3: cr 1.24, GFR 50 on 6/24. Cr 1.43 on 7/1. Cr 1.32 gfr 51 on 9/16.     Electronically signed by: Delmi Regan NP

## 2021-06-21 NOTE — PROGRESS NOTES
[facility] Discharge Summary  Patient Name: Raymond RODRIGUEZ [facility]  Date: 11/19/2018  Referral Diagnosis: memory difficulty  Referring provider: Janes Marie MD  Visit Diagnosis:   1. Memory difficulty     2. Decreased activities of daily living (ADL)         Goal status: met    Patient was seen for 1 visit. The patient will need a new referral to resume.    Thank you for your referral.  Lily Noriega  11/19/2018  12:44 PM

## 2021-06-23 NOTE — TELEPHONE ENCOUNTER
Please call patient's wife Sydni -    ______________________________________________________________________     Home phone:  [phone] (home)     Cell phone:   No relevant phone numbers on file.       Other contacts:  Name Home Phone Work Phone Mobile Phone Relationship Lgl GrRILEY Izquierdo [phone]   Other    SYDNI BURR [phone]   Spouse      ______________________________________________________________________     His kidney and liver tests are stable and his red blood cell count is stable.    There is evidence he has more fluid in his system.    I would recommend starting torsemide 10 mg a day for his fluid retention and blood pressure.    I will send in a prescription for this to   [id number] - [place], [place] - [address] AT [facility] [address]  [facility] [address]  [place] [address]  Phone: [phone] Fax: [phone]      Schedule a follow up again for 6-8 weeks please.    Janes Marie MD  [facility]  2/1/2019, 11:11 PM     ______________________________________________________________________    Recent Results (from the past 240 hour(s))   BNP(B-type Natriuretic Peptide)   Result Value Ref Range    BNP 1,032 (H) 0 - 93 pg/mL   HM2(CBC w/o Differential)   Result Value Ref Range    WBC 7.5 4.0 - 11.0 thou/uL    RBC 4.10 (L) 4.40 - 6.20 mill/uL    Hemoglobin 13.1 (L) 14.0 - 18.0 g/dL    Hematocrit 38.9 (L) 40.0 - 54.0 %    MCV 95 80 - 100 fL    MCH 31.9 27.0 - 34.0 pg    MCHC 33.6 32.0 - 36.0 g/dL    RDW 11.5 11.0 - 14.5 %    Platelets 359 140 - 440 thou/uL    MPV 8.5 7.0 - 10.0 fL   Comprehensive Metabolic Panel   Result Value Ref Range    Sodium 136 136 - 145 mmol/L    Potassium 4.8 3.5 - 5.0 mmol/L    Chloride 102 98 - 107 mmol/L    CO2 25 22 - 31 mmol/L    Anion Gap, Calculation 9 5 - 18 mmol/L    Glucose 94 70 - 125 mg/dL    BUN 21 8 - 28 mg/dL    Creatinine 1.21 0.70 - 1.30 mg/dL    GFR MDRD Af Amer >60 >60  mL/min/1.73m2    GFR MDRD Non Af Amer 56 (L) >60 mL/min/1.73m2    Bilirubin, Total 1.3 (H) 0.0 - 1.0 mg/dL    Calcium 10.5 8.5 - 10.5 mg/dL    Protein, Total 7.4 6.0 - 8.0 g/dL    Albumin 4.0 3.5 - 5.0 g/dL    Alkaline Phosphatase 151 (H) 45 - 120 U/L    AST 30 0 - 40 U/L    ALT 19 0 - 45 U/L   INR   Result Value Ref Range    INR 2.70 (H) 0.90 - 1.10       ______________________________________________________________________

## 2021-06-23 NOTE — PROGRESS NOTES
BP Readings from Last 20 Encounters:   02/01/19 (!) 172/94   12/11/18 134/70   11/21/18 120/66   10/02/18 120/60   09/28/18 138/60   08/14/18 126/54   07/03/18 142/78   05/30/18 130/80   05/22/18 (!) 152/94   05/15/18 152/70   05/07/18 (!) 185/94   04/25/18 148/80   01/15/18 120/80   12/18/17 114/78   11/15/17 163/79   11/14/17 104/70   11/10/17 124/78   11/01/17 152/84   11/01/17 (!) 170/92   10/27/17 164/86

## 2021-06-23 NOTE — TELEPHONE ENCOUNTER
Patient Returning Call  Reason for call:   Spouse is returning call  Information relayed to patient:   His kidney and liver tests are stable and his red blood cell count is stable.     There is evidence he has more fluid in his system.     I would recommend starting torsemide 10 mg a day for his fluid retention and blood pressure.     I will send in a prescription for this to   [id number] - [place], [place] - [address] AT [facility] [address]  [address]  [place] [address]  Phone: [phone] Fax: [phone]      Schedule a follow up again for 6-8 weeks please.  Patient has additional questions:  Yes  If YES, what are your questions/concerns:  Spouse sent to Scheduling  Okay to leave a detailed message?: No call back needed

## 2021-06-23 NOTE — TELEPHONE ENCOUNTER
LMTCB.    If pt calls back please inform of Dr. Marie's message and assist with making an appointment.

## 2021-06-24 NOTE — TELEPHONE ENCOUNTER
[facility] Nurse Triage Telephone Note      Provider: ANABELA Contreras  Facility: [facility]    Facility Type: [facility]    Caller: Alicja  Call Back Number:  [phone]    Allergies: Ciprofloxacin; Erythromycin base; Oxycodone-acetaminophen; and Sulfa (sulfonamide antibiotics)    Reason for call: Constipation, not on any laxative.     Verbal Order/Direction given by Provider: Senna S 1 daily, & Miralax 17Gm po daily PRN.    Provider giving order: ANABELA Contreras    Verbal order given to: Alicja Scott RN

## 2021-06-24 NOTE — TELEPHONE ENCOUNTER
Orders being requested: Medication list with a physician signature   Reason service is needed/diagnosis: to set up medications and get medications dispensed from pharmacy  When are orders needed by: Patient has moved in on 2.23.2019, they would like as soon as possible  Where to send Orders: Fax: [phone]  Okay to leave detailed message?  Yes

## 2021-06-24 NOTE — PROGRESS NOTES
[facility]    DATE: 3/6/2019    NAME:  Raymond Zheng             :  1925  MRN: [id number]  CODE STATUS:  FULL CODE    VISIT TYPE: H & P     FACILITY:  [facility] [[id number]]       CHIEF COMPLAIN/REASON FOR VISIT:    Chief Complaint   Patient presents with     H & P               HISTORY OF PRESENT ILLNESS: Raymond Zheng is a 89 y.o. male who is a new resident to [facility]. He was previously following with Dr. Janes Marie for primary care but has now chosen to follow with in house primary service. He is seen today with his wife to establish care. He has PMH of A fib, CVA, prostate cancer, HTN, knee osteoarthritis, low back pain, lumbar stenosis, pacemaker, vit b12 deficiency, memory issues, mild pulmonary HTN, dependent edema, dyspnea.     Today Mr. Zheng is seen in his apartment with wife Sydni at his side. They report that it has been increasingly difficult for them to get out for appointments anymore. They are no longer able to drive and rely on friends to take them. This is difficult as their friends are busy and they really don't have any family nearby to help out. They would like to switch to the in house providers for dentist and podiatry. They will need to continue with their eye doctors. Raymond also follows with urology for h/o of prostate cancer but he is only being seen about once a year now. He has been following with cardiology as well but they would like to cut back on that as much as possible. His wife reports he has not had a home monitor for his pacemaker, so they recently spoke to his cardiology office and [employer] and are waiting for them to send out a home monitor.  This will help reduce the number of times they have to go to the clinic. He and his wife report they do have a scale and try to weigh somewhat regularly. He has been around 162lbs and has been steady recently. They do report they have been eating  more since moving into assisted living as they are offered more food at meals. Raymond reports his appetite is good and he sleeps well generally. He denies any dizziness or headaches. He has not had any issues with nausea, vomiting, or stomach upset recently. He does have issues with constipation at times but takes prune juice when needed. His wife is requesting they be changed to a stronger stool softener. They were both using some generic one from pharmacy but not sure the name of it. After reviewing meds in medicine cupboard discussed it is colace and can change to senna. They both report they do not want to try miralax. He does report he had a fall shortly after they moved into assisted living. He was in the chair and trying to get up and scratched up his arm. His wife reports he had a skin tear but it is scabbed up now and healed. He has not had any further falls. He had bleeding in his eye a while back and follows with the eye doctor. He also has macular degeneration and does eye drops for this. He has a follow up on Monday again. He has not had any chest pain or swelling in his legs. He has no trouble urinating but takes medication and follows with the urologist every year. He did have radiation seeding in 2004 his wife reports. He had a stroke some time ago but no real deficits from this. His wife reports he is having short term memory issues and they seem to be progressing. She is wondering about medications to help slow this process. She would like to discuss options today. He has some shortness of breath with extreme exertion but he can walk down to the dining small without any issues. It would just be if he was doing stairs or walking fast. He uses the walker sometimes. He does not have any pain today but his wife reports giving him some tylenol if needed, usually for back pain. He denies any other concerns recently. His wife reports they are now getting meds through in house pharmacy and nurses are setting  up meds. Their meals are being provided but otherwise no services unless requesting.     REVIEW OF SYSTEMS:  PROBLEMS AND REVIEW OF SYSTEMS:   Review of Systems  Today on ROS:   Currently, no fever, chills, or rigors. Decreased vision and hearing. Denies any chest pain, headaches, palpitations, lightheadedness, dizziness. Appetite is good. Denies any GERD symptoms. Denies any difficulty with swallowing, nausea, or vomiting.  Denies any abdominal pain, diarrhea or constipation. Denies any urinary symptoms. No insomnia.  Positive for forgetfulness, short term memory issues, ambulates with walker, recent fall, constipation, swelling in legs resolved recently, shortness of breath with extreme exertion      Allergies   Allergen Reactions     Ciprofloxacin      Erythromycin Base      Oxycodone-Acetaminophen      Sulfa (Sulfonamide Antibiotics)      Current Outpatient Medications   Medication Sig     acetaminophen (TYLENOL) 500 MG tablet Take 500 mg by mouth every 6 (six) hours as needed for pain.     atropine 1 % ophthalmic solution 1 drop at bedtime.     cyanocobalamin (VITAMIN B-12) 1000 MCG tablet Take 500 mcg by mouth daily.            digoxin (LANOXIN) 125 mcg tablet TAKE ONE TABLET BY MOUTH DAILY     FOLIC ACID/MV,FE,MIN (CENTRUM ORAL) Take 1 tablet by mouth daily.      memantine (NAMENDA XR) 7 mg CSpX Take 7 mg by mouth at bedtime.     metoprolol succinate (TOPROL XL) 50 MG 24 hr tablet Take 1 tablet (50 mg total) by mouth daily.     nitroglycerin (NITROSTAT) 0.4 MG SL tablet Place 0.4 mg under the tongue as needed for chest pain.     prednisoLONE acetate (PRED-FORTE) 1 % ophthalmic suspension 1 drop every 3 (three) hours.     senna (SENOKOT) 8.6 mg tablet Take 1 tablet by mouth daily as needed for constipation.     tamsulosin (FLOMAX) 0.4 mg Cp24 Take 1 capsule (0.4 mg total) by mouth daily.     torsemide (DEMADEX) 10 MG tablet Take 1 tablet (10 mg total) by mouth daily.     warfarin (COUMADIN) 2.5 MG tablet Take  0.5-1 tablets (1.25-2.5 mg total) by mouth daily. 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 Followed by [facility].       Vitamin B12 deficiency      Vitiligo            PHYSICAL EXAMINATION  Vitals:    03/06/19 0700   BP: 145/78   Pulse: 77   Resp: 18   Temp: 96.7  F (35.9  C)   SpO2: 100%       Today on physical exam:     GENERAL: Awake, Alert, oriented x3, not in any form of acute distress, answers questions appropriately, follows simple commands, conversant  HEENT: Head is normocephalic with normal hair distribution. No evidence of trauma. Ears: No acute purulent discharge. Eyes: Conjunctivae pink with no scleral jaundice. Nose: Normal mucosa and septum. NECK: Supple with no cervical or supraclavicular lymphadenopathy. Trachea is midline. Glasses, hearing aids  CHEST: No tenderness or deformity, no crepitus  LUNG: dim to auscultation with good chest expansion. There are no crackles or wheezes, normal AP diameter. Shortness of breath with extreme exertion  BACK: No kyphosis of the thoracic spine. Symmetric, no curvature, ROM normal, no CVA tenderness, no spinal tenderness; lumbar pain at times, denies today  CVS: irregularly irregular rhythm, systolic murmur,  2+ pulses symmetric in all extremities.  ABDOMEN: Rounded and soft, nontender to palpation, non distended, no masses, no organomegaly, good bowel sounds, no rebound or guarding, no peritoneal signs.   EXTREMITIES: No pedal edema, darin discoloration, left arm skin tear scabbed and healed  SKIN: Warm and dry,   NEUROLOGICAL: The  patient is oriented to person, place and time. Forgetful at times, short term memory issues, no numb/tingling ble.            LABS:   No results found for this or any previous visit (from the past 168 hour(s)).  Results for orders placed or performed during the hospital encounter of 05/07/18   Basic Metabolic Panel   Result Value Ref Range    Sodium 134 (L) 136 - 145 mmol/L    Potassium 4.5 3.5 - 5.0 mmol/L    Chloride 99 98 - 107 mmol/L    CO2 23 22 - 31 mmol/L    Anion Gap, Calculation 12 5 - 18 mmol/L    Glucose 104 70 - 125 mg/dL    Calcium 10.0 8.5 - 10.5 mg/dL    BUN 18 8 - 28 mg/dL    Creatinine 1.12 0.70 - 1.30 mg/dL    GFR MDRD Af Amer >60 >60 mL/min/1.73m2    GFR MDRD Non Af Amer >60 >60 mL/min/1.73m2         Lab Results   Component Value Date    WBC 7.5 02/01/2019    HGB 13.1 (L) 02/01/2019    HCT 38.9 (L) 02/01/2019    MCV 95 02/01/2019     02/01/2019       Lab Results   Component Value Date    CYDRAEGG64 796 10/02/2018     No results found for: HGBA1C  Lab Results   Component Value Date    INR 2.46 (H) 03/01/2019    INR 2.70 (H) 02/01/2019    INR 2.30 (H) 01/09/2019     No results found for: [id number]  Lab Results   Component Value Date    TSH 2.08 08/04/2015           ASSESSMENT/PLAN:    1. Atrial fibrillation: Rate controlled in 70s. No chest pain or palpitations. On digoxin, metoprolol, warfarin. Anticoagulation clinic now managing warfarin in house. INR last on 3/1 2.46, stable and recheck in 4 weeks.   2. HTN: SBP 140s. On metoprolol. Appears stable.   3. S/p pacemaker: Follows with cardiology, JANZZ. Recently requested home monitor so not have to go in for as frequent of checks.   4. Pulmonary hypertension, Dyspnea: Only with extreme exertion. No chest pain or recent concerns. Follows with cardiology.   5. Dependent edema: No edema today. Per his report resolved after starting torsemide daily. Will continue for now. Recommended weighing frequently-162lbs recently and stable.  Tries to elevate legs in recliner, does not feel gaurav hose are doable for him with back pain/stenosis.   6. H/o prostate cancer: No recent issues. On flomax. Radiation seeding in 2004. No recurrence. F/u with urology annually.   7. Lumbar stenosis, chronic back pain: Denies pain today. None recently but does take tylenol prn. No bowel or bladder incontinence, no numb/tingling ble.   8. Cognitive impairment, short term memory issues: Discussed in depth today options for treatment and risks v benefits. Will trial namenda 7mg xr at bedtime and monitor.   9. H/o CVA: No residual deficits but does have issues with memory which may or may not be related. Possibly vascular dementia component. CT head in may of 2018 showed increase in periventricular low attenuation when compared to 8/22/2009 suggesting progress of chronic microvascular ischemic changes. On warfarin. No benefit of statin given age and risk v benefit.   10. Macular degeneration, intraocular hemorrhage: Follows with Dr. Bill Figueroa. On atropine and prednisolone gtts. No injections as of yet.   11. Constipation: On colace prn and taking prune juice. Will order senna daily prn.   12. Vitamin b 12 deficiency: On vitamin b 12 daily.     Labs recently checked in February 2019, b12 level in October 2018. Will hold off on labs today and review at next visit in 3 months.     Electronically signed by: Delmi Regan NP    Total time spent with patient 60 minutes for admission history and physical, 40 min of which was spent on counseling and coordination of care. Discussed extensively diagnoses, medical history, medication list, lab results. Discussed current symptoms and transition to assisted living in regards to treatments, specialty visits, and ongoing lab and imaging monitoring. Discussed memory issues and cognitive testing as well as treatment options. Coordinated care with director of nursing as well as home care nurses in regards to medication set up, changes  in services, recent fall, and establishing care with in house providers.

## 2021-06-25 NOTE — PROGRESS NOTES
Progress Notes by Janes Marie MD at 1/20/2017 11:45 AM     Author: Janes Marie MD Service: -- Author Type: Physician    Filed: 1/22/2017  7:24 PM Encounter Date: 1/20/2017 Status: Signed    : Janes Marie MD (Physician)              [facility]    Comprehensive and complex medical care - Chronic disease management - Shared decision making - Care coordination - Compassionate care    Date of Service: 1/20/2017  Primary Provider: Janes Marie MD    Patient Care Team:  Janes Marie MD as PCP - General (Internal Medicine)  William Corbett MD as Physician (Cardiology)  Cam Weston MD as Physician (Urology)     ______________________________________________________________________     Patient's Pharmacy:    [facility] [id number] [facility] [employer] - [place], [place] - [address] [facility]  Critical access hospital [facility] [address] [place] [address]  Phone: [phone] Fax: [phone]     Patient's Insurance:    Payor: ARE / Plan: [facility] / Product Type: MEDICARE ADVANTAGE /     ______________________________________________________________________     Raymond Zheng is 89 y.o. male who comes in today for:    Chief Complaint   Patient presents with   ? Follow-up     Needs INR checked       Patient Active Problem List   Diagnosis   ? Prostate cancer - 2002 - [place] 7 - Treated with radiation brachytherapy. Followed by [facility].   ? Pacemaker   ? Atrial fibrillation   ? Vitamin B12 deficiency   ? Knee osteoarthritis   ? CVA (cerebral infarction)   ? HTN (hypertension)   ? Former smoker   ? DNR (do not resuscitate)      Current Outpatient Prescriptions   Medication Sig Note   ? cyanocobalamin (VITAMIN B-12) 1000 MCG tablet Take 1,000 mcg by mouth daily.    ? digoxin (LANOXIN) 125 mcg tablet TAKE ONE TABLET BY MOUTH DAILY NEEDS TO BE SEEN FOR FURTHER REFILLS.    ? FOLIC ACID/MV,FE,MIN (CENTRUM ORAL) Take by mouth.    ? gabapentin (NEURONTIN) 100 MG capsule Take  100-200 mg by mouth bedtime as needed. 1/7/2017: Not taking   ? metoprolol succinate (TOPROL-XL) 25 MG TAKE ONE TABLET BY MOUTH ONE TIME DAILY    ? warfarin (COUMADIN) 2.5 MG tablet TAKE 1 TABLET BY MOUTH EVERY SUN, TUES AND THURS AND 1/2 TABLET ON ALL OTHER DAYS or take as directed per INR results    ? diltiazem (CARDIZEM CD) 180 MG 24 hr capsule TAKE ONE CAPSULE BY MOUTH ONE TIME DAILY    ? furosemide (LASIX) 40 MG tablet Take 20 mg by mouth daily.    ? levoFLOXacin (LEVAQUIN) 500 MG tablet Take 1 tablet (500 mg total) by mouth daily for 14 days. CLARIFICATION.    ? nitroglycerin (NITROSTAT) 0.4 MG SL tablet Place 0.4 mg under the tongue as needed for chest pain.    ? tamsulosin (FLOMAX) 0.4 mg Cp24 Take 1 capsule (0.4 mg total) by mouth daily. 1/7/2017: Only as needed     Social History     Social History Narrative     ______________________________________________________________________     History of present illness:    The patient comes in today with his wife.    He comes in today mainly for follow-up of his pelvic pain.  This went away the first time with 28 days of Levaquin.  Within the next week or so, it came back and so Levaquin was restarted.  At the last visit, we had also had him on tamsulosin for the last 2 weeks of his antibiotic treatment and this may have helped as well.  His previous CT done recently is as noted below.  We talked about possibly following up with urology if this is not improving.  He understands this.    Reviewed his prostate cancer.  At the last initial diagnosis his PSA had went up to near 14, but then went down to around 12 with treatment.  This was reviewed with him as well.    We reviewed his atrial fibrillation.  He does need his INR checked today.    We reviewed his high blood pressure and his blood pressure is doing well today.    On review of systems, the patient denies any chest pain or shortness of  breath.    ______________________________________________________________________    Exam:    Wt Readings from Last 3 Encounters:   01/20/17 155 lb 9.6 oz (70.6 kg)   01/07/17 159 lb 4.8 oz (72.3 kg)   01/04/17 153 lb (69.4 kg)     BP Readings from Last 3 Encounters:   01/20/17 130/78   01/07/17 120/70   01/04/17 138/88     Visit Vitals   ? /78 (Patient Site: Right Arm, Patient Position: Sitting, Cuff Size: Adult Regular)   ? Pulse (!) 55   ? Wt 155 lb 9.6 oz (70.6 kg)   ? SpO2 95%   ? BMI 23.15 kg/m2       The patient is comfortable, no acute distress.  Mood good.  Insight good.  Eyes are nonicteric.  Neck is supple without mass.  No cervical adenopathy.  No thyromegaly. Heart regular rate and rhythm.  Lungs clear to auscultation bilaterally.  Respiratory effort is good.  Abdomen soft and nontender.  No hepatosplenomegaly.  Extremities no edema.    His recent prostate exam in walk-in care was reviewed.    ______________________________________________________________________     CT ABDOMEN PELVIS WO ORAL W IV CONTRAST  11/30/2016 4:27 PM      INDICATION: abdominal pain and bloating  TECHNIQUE: CT abdomen and pelvis. Multiplanar reformation images (MPR). Dose reduction techniques were used.   IV CONTRAST: Iohexol (Omni) 75mL  COMPARISON: None.     FINDINGS:  LUNG BASES: Lung bases are clear. There is mild cardiomegaly without cardiac pacemaker in place.     ABDOMEN: There are multiple small stones dependently in the gallbladder lumen. No biliary dilatation. No common duct stones are identified. The spleen, adrenal glands are normal. There is pancreatic atrophy with fatty replacement of the pancreatic head.   No abnormal pancreatic mass. There are bilateral renal cysts, left larger than right the largest left renal cyst measures 3.8 cm in diameter. There is mild prominence of the left extrarenal pelvis no left renal or ureteral stones or ureteral dilatation.   No right renal stones or solid masses. There is no  evidence for bowel obstruction or free intraperitoneal air. Patient is status post anterior abdominal wall repair with multiple surgical clips. No recurrent hernia is seen. There is calcification of the   abdominal aorta and renal artery origins end of the superior mesenteric artery origin. No aneurysm. No evidence for appendicitis. There is no abdominal lymphadenopathy.     PELVIS: There are brachial therapy seeds in the prostate. No evidence for diverticulitis or abscess. No bowel containing hernias no pelvic lymphadenopathy.     MUSCULOSKELETAL: There is lumbar scoliosis convex to the left with multilevel degenerative disc disease. There is mild compression of superior endplates of L4 and L5 which appears chronic no acute fractures are identified.     Findings were called to Dr. Gamez at 1643 hours on 11/30/2016.     IMPRESSION:   CONCLUSION:  1. No evidence for bowel obstruction, appendicitis, diverticulitis or abscess.  2. Bilateral renal cysts.  3. Arterial calcification without aneurysm.  4. Cholelithiasis.  5. The cause of the patient's symptoms is not evident on this examination.    ______________________________________________________________________     Lab Results   Component Value Date    COLORU Yellow 01/07/2017    CLARITYU Clear 01/07/2017    GLUCOSEU Negative 01/07/2017    BILIRUBINUR Negative 01/07/2017    KETONESU Negative 01/07/2017    SPECGRAV 1.015 01/07/2017    HGBUA Negative 01/07/2017    PHUR 5.5 01/07/2017    PROTEINUA 30 mg/dL (!) 01/07/2017    UROBILINOGEN 0.2 E.U./dL 01/07/2017    NITRITE Negative 01/07/2017    LEUKOCYTESUR Negative 01/07/2017    BACTERIA None Seen 01/07/2017    RBCUA 0-2 01/07/2017    WBCUA 0-5 01/07/2017    SQUAMEPI 0-5 01/07/2017    TRANSEPI 0-5 (!) 11/30/2016      ______________________________________________________________________    Assessment:    1. Pelvic pain     2. A-fib  INR   3. Prostate cancer - 2002 - [place] 7 - Treated with radiation brachytherapy.  Followed by [facility].     4. Chronic atrial fibrillation     5. HTN (hypertension)       ______________________________________________________________________     PHQ-2 Total Score: 0 (10/17/2016  8:00 AM)  No Data Recorded      Plan:    1. Continuing to treat this pelvic pain as low-grade prostatitis.  2. He did improve with antibiotics the first time, so we are attempting one further treatment.  3. He is to follow-up with Dr. Degroot if this is not working out for him.  4. He is to take the tamsulosin 0.4 mg a day at this time as well.  5. He is to follow-up with me as well as not improving.  6. Given 2 more weeks of Levaquin.       Janes Marie MD  General Internal Medicine  [facility]     Personal office fax - [phone]  Voice mail - [phone]  E-mail - [email or web address]    Return if symptoms worsen or fail to improve.

## 2021-06-25 NOTE — PROGRESS NOTES
Progress Notes by Janes Marie MD at 10/6/2017  9:40 AM     Author: Janes Marie MD Service: -- Author Type: Physician    Filed: 10/6/2017  9:09 PM Encounter Date: 10/6/2017 Status: Signed    : Janes Marie MD (Physician)              [facility]    Comprehensive and complex medical care - Chronic disease management - Shared decision making - Care coordination - Compassionate care    Patient advocacy - Rational deprescribing - Minimally disruptive medicine - Ethical focus - Customized care    Date of Service: 10/6/2017  Primary Provider: Janes Marie MD    Patient Care Team:  Janes Marie MD as PCP - General (Internal Medicine)  William Corbett MD as Physician (Cardiology)  Cam Weston MD as Physician (Urology)     ______________________________________________________________________     Patient's Pharmacy:    [id number] [place] [facility] - [place], [place] - [address] [place] [address]  Phone: [phone] Fax: [phone]     Patient's Insurance:    Payor: UCARE / Plan: ARE FOR SENIORS / Product Type: MEDICARE ADVANTAGE /       ______________________________________________________________________     Routine physical - male, age 65 and older.    Raymond Zheng is a 89 y.o. male coming in today for a routine physical.  He does not have other issues outside the physical to discuss as well.    Past Medical History:   Diagnosis Date   ? Atrial fibrillation    ? CVA (cerebral infarction)    ? DNR (do not resuscitate) 10/18/2016   ? Former smoker    ? HTN (hypertension)    ? Knee osteoarthritis    ? Pacemaker    ? Prostate cancer     Treated with radiation brachytherapy. Followed by [facility].     ? Vitamin B12 deficiency    ? Vitiligo      Social History     Social History   ? Marital status:      Spouse name: Sydni   ? Number of children: 0   ? Years of education: N/A     Occupational History    ?  Retired     Social History Main Topics   ? Smoking status: Former Smoker   ? Smokeless tobacco: None   ? Alcohol use 1.5 oz/week     3 drink(s) per week   ? Drug use: None   ? Sexual activity: Not Asked     Other Topics Concern   ? None     Social History Narrative    .  Lives in home with wife.        Former .      Family History   Problem Relation Age of Onset   ? Cancer Mother    ? Kidney disease Father    ? Heart disease Brother    ? Lung cancer Brother    ? Stroke Sister    ? Heart disease Sister      Family history is otherwise noncontributory.    Current Outpatient Prescriptions   Medication Sig   ? cyanocobalamin (VITAMIN B-12) 1000 MCG tablet Take 1,000 mcg by mouth daily.   ? diclofenac sodium (VOLTAREN) 1 % Gel Apply 1 application topically 3 (three) times a day. Each application should be 2-4 grams.   ? digoxin (LANOXIN) 125 mcg tablet TAKE ONE TABLET BY MOUTH DAILY   ? diltiazem (CARDIZEM CD) 180 MG 24 hr capsule TAKE ONE CAPSULE BY MOUTH ONE TIME DAILY   ? FOLIC ACID/MV,FE,MIN (CENTRUM ORAL) Take 1 tablet by mouth daily.    ? gabapentin (NEURONTIN) 100 MG capsule Take 100 mg by mouth at bedtime as needed.   ? metoprolol succinate (TOPROL-XL) 25 MG Take 1 tablet (25 mg total) by mouth daily.   ? nitroglycerin (NITROSTAT) 0.4 MG SL tablet Place 0.4 mg under the tongue as needed for chest pain.   ? tamsulosin (FLOMAX) 0.4 mg Cp24 TAKE 1 CAPSULE BY MOUTH DAILY.   ? warfarin (COUMADIN) 2.5 MG tablet Take one-half to 1 tablet (1.25mg-2.5mg ) by mouth daily as directed. Adjust dose as directed per INR results.     Immunization History   Administered Date(s) Administered   ? DT (pediatric) 01/01/1998   ? Influenza high dose, seasonal 09/30/2014, 10/02/2015, 10/03/2016   ? Influenza, inj, historic 10/15/2007, 10/13/2008, 09/15/2009, 09/20/2010, 09/14/2017   ? Influenza, seasonal,quad inj 6-35 mos 01/04/2013, 09/27/2013   ? Pneumo Polysac 23-V 10/09/2001, 09/30/2004   ? Td, historic  "08/26/2009   ? ZOSTER 03/16/2009      ______________________________________________________________________     History of present illness:    Patient comes in today with his wife for his physical.  He has no additional new issues to discuss outside of the visit today.    We reviewed his back pain.  He has been following with the spine clinic for this.  It is doing better with therapy at this time.    Reviewed his prostate cancer.  He has been stable in relationship to this and follows with urology.    We reviewed his pacemaker and this is working well.    Reviewed his atrial fibrillation and he is doing well with this.  He takes warfarin for this.  He takes his other medications for this.    He has had no new strokelike symptoms.    His blood pressure has been doing well generally.    We reviewed his other issues noted in the assessment but not specifically addressed in the HPI above.      The 10-system review of systems and health history [facility] was completed by patient, reviewed by me, and pertinent problems are reviewed above.   ______________________________________________________________________    Exam:  Wt Readings from Last 3 Encounters:   10/06/17 153 lb 9.6 oz (69.7 kg)   10/04/17 156 lb (70.8 kg)   08/25/17 155 lb (70.3 kg)     BP Readings from Last 3 Encounters:   10/06/17 130/76   10/04/17 148/67   08/25/17 122/74     /76  Pulse 67  Ht 5' 8.5\" (1.74 m)  Wt 153 lb 9.6 oz (69.7 kg)  SpO2 97%  BMI 23.02 kg/m2   The patient is in no acute distress.  Mood good.  Insight fair.  No skin lesions or nodules of concern except for a thickened lesion over his left temple.  He will be going to dermatology soon.  Ears are clear.  Nose is clear.  Throat is clear.  Neck is supple and there is no thyromegaly.  No carotid bruits.  No cervical, epitrochlear or axillary adenopathy.  Heart regular rate and rhythm.  No murmur present.  Lungs clear to auscultation bilaterally.  Respiratory effort is " good.  Abdomen is soft, nontender.  No hepatosplenomegaly.  No hernia appreciated.  No aortic aneurysm appreciated.  Extremities show no edema.  Neuro exam shows normal strength in all muscle groups and normal reflexes.   exam as per urology   ______________________________________________________________________    Diagnostics:    Results for orders placed or performed in visit on 10/06/17   INR   Result Value Ref Range    INR 3.00 (H) 0.90 - 1.10      ______________________________________________________________________     Assessment:    1. Routine physical examination    2. Chronic atrial fibrillation    3. DNR (do not resuscitate)    4. Cerebral infarction    5. Prostate cancer - 2002 - [place] 7 - Treated with radiation brachytherapy. Followed by [facility].    6. HTN (hypertension)    7. Knee osteoarthritis    8. Pacemaker    9. Vitamin B12 deficiency    10. Low back pain       ______________________________________________________________________     ______________________________________________________________________    QUALITY REVIEW      There are no preventive care reminders to display for this patient.    History   Smoking Status   ? Former Smoker   Smokeless Tobacco   ? Not on file        PHQ-2 Total Score: 0 (10/6/2017  9:00 AM)  No Data Recorded     ______________________________________________________________________       Plan:    1. We reviewed his blood work that he recently had done.  2. He will follow-up with dermatology related to the left forehead lesion.  3. He will continue other medications as is.    Janes Marie MD  General Internal Medicine  [facility]    Return in about 6 months (around 4/6/2018) for follow up visit.

## 2021-06-25 NOTE — PROGRESS NOTES
[facility]    DATE: 3/20/2019    NAME:  Raymond Zheng             :  1925  MRN: [id number]  CODE STATUS:  FULL CODE    VISIT TYPE: Problem Visit (back pain)     FACILITY:  [facility] [[id number]]       CHIEF COMPLAIN/REASON FOR VISIT:    Chief Complaint   Patient presents with     Problem Visit     back pain               HISTORY OF PRESENT ILLNESS: Raymond Zheng is a 89 y.o. male who is a resident to [facility]. He has PMH of A fib, CVA, prostate cancer, HTN, knee osteoarthritis, low back pain, lumbar stenosis, pacemaker, vit b12 deficiency, memory issues, mild pulmonary HTN, dependent edema, dyspnea.     Today Mr. Zheng is seen for concerns of worsening back pain. He is seen in his apartment with his wife at his side. He reports that he thinks he takes tylenol at times and that this does help some. His wife reports he takes the regular strength tylenol and is maybe only taking it once a day. He is willing to try the extra strength tylenol and taking this twice a day scheduled. The nurses set up their meds so this would be easiest for him to remember to take them. He says he is still having trouble with constipation even with the miralax. He would be fine with taking 2 senna tabs at night instead of just one. He says otherwise he is doing ok and getting around without difficulty. His wife reports no other recent concerns today. Discussed he still has no edema in legs today and feel he no longer needs the torsemide. We discussed risk v benefit of this medication and that he is elevating his legs most of the day while sitting in recliner.     REVIEW OF SYSTEMS:  PROBLEMS AND REVIEW OF SYSTEMS:   Review of Systems  Today on ROS:   Currently, no fever, chills, or rigors. Decreased vision and hearing. Denies any chest pain, headaches, palpitations, lightheadedness, dizziness. Appetite is good. Denies any GERD symptoms. Denies any difficulty  with swallowing, nausea, or vomiting.  Denies any abdominal pain, diarrhea or constipation. Denies any urinary symptoms. No insomnia.  Positive for forgetfulness, short term memory issues, ambulates with walker, constipation, swelling in legs resolved, shortness of breath with extreme exertion      Allergies   Allergen Reactions     Ciprofloxacin      Erythromycin Base      Oxycodone-Acetaminophen      Sulfa (Sulfonamide Antibiotics)      Current Outpatient Medications   Medication Sig     acetaminophen (TYLENOL) 500 MG tablet Take 1,000 mg by mouth 2 (two) times a day. And q6h prn           atropine 1 % ophthalmic solution 1 drop at bedtime.     cyanocobalamin (VITAMIN B-12) 1000 MCG tablet Take 500 mcg by mouth daily.            digoxin (LANOXIN) 125 mcg tablet TAKE ONE TABLET BY MOUTH DAILY     FOLIC ACID/MV,FE,MIN (CENTRUM ORAL) Take 1 tablet by mouth daily.      memantine (NAMENDA XR) 7 mg CSpX Take 7 mg by mouth at bedtime.     metoprolol succinate (TOPROL XL) 50 MG 24 hr tablet Take 1 tablet (50 mg total) by mouth daily.     nitroglycerin (NITROSTAT) 0.4 MG SL tablet Place 0.4 mg under the tongue as needed for chest pain.     polyethylene glycol (MIRALAX) 17 gram packet Take 17 g by mouth daily as needed.     prednisoLONE acetate (PRED-FORTE) 1 % ophthalmic suspension 1 drop every 3 (three) hours.     senna (SENOKOT) 8.6 mg tablet Take 2 tablets by mouth daily.            senna-docusate (SENNOSIDES-DOCUSATE SODIUM) 8.6-50 mg tablet Take 1 tablet by mouth daily.     tamsulosin (FLOMAX) 0.4 mg Cp24 Take 1 capsule (0.4 mg total) by mouth daily.     warfarin (COUMADIN) 2.5 MG tablet Take 0.5-1 tablets (1.25-2.5 mg total) by mouth daily. 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 Followed by [facility].       Vitamin B12 deficiency      Vitiligo            PHYSICAL EXAMINATION  There were no vitals filed for this visit.    Today on physical exam:     GENERAL: Awake, Alert, oriented x3, not in any form of acute distress, answers questions appropriately, follows simple commands, conversant  HEENT: Head is normocephalic with normal hair distribution. No evidence of trauma. Ears: No acute purulent discharge. Eyes: Conjunctivae pink with no scleral jaundice. Nose: Normal mucosa and septum. NECK: Supple with no cervical or supraclavicular lymphadenopathy. Trachea is midline. Glasses, hearing aids  CHEST: No tenderness or deformity, no crepitus  LUNG: dim to auscultation with good chest expansion. There are no crackles or wheezes, normal AP diameter. Shortness of breath with extreme exertion  BACK: No kyphosis of the thoracic spine. Symmetric, no curvature, ROM normal, no CVA tenderness, no spinal tenderness; lumbar pain  CVS: irregularly irregular rhythm, systolic murmur,  2+ pulses symmetric in all extremities.  ABDOMEN: Rounded and soft, nontender to palpation, non distended, no masses, no organomegaly, good bowel sounds, no rebound or guarding, no peritoneal signs.   EXTREMITIES: No pedal edema, darin discoloration  SKIN: Warm and dry,   NEUROLOGICAL: The patient is oriented to person, place and time. Forgetful at times, short term memory issues, no numb/tingling ble.            LABS:   No results found for this or any previous visit (from the past 168 hour(s)).  Results for orders placed or performed during the hospital encounter of 05/07/18   Basic Metabolic Panel   Result Value Ref Range    Sodium 134 (L) 136 - 145 mmol/L    Potassium 4.5 3.5 - 5.0 mmol/L    Chloride 99 98 - 107 mmol/L    CO2 23  22 - 31 mmol/L    Anion Gap, Calculation 12 5 - 18 mmol/L    Glucose 104 70 - 125 mg/dL    Calcium 10.0 8.5 - 10.5 mg/dL    BUN 18 8 - 28 mg/dL    Creatinine 1.12 0.70 - 1.30 mg/dL    GFR MDRD Af Amer >60 >60 mL/min/1.73m2    GFR MDRD Non Af Amer >60 >60 mL/min/1.73m2         Lab Results   Component Value Date    WBC 7.5 02/01/2019    HGB 13.1 (L) 02/01/2019    HCT 38.9 (L) 02/01/2019    MCV 95 02/01/2019     02/01/2019       Lab Results   Component Value Date    DMOZDDUS08 796 10/02/2018     No results found for: HGBA1C  Lab Results   Component Value Date    INR 2.46 (H) 03/01/2019    INR 2.70 (H) 02/01/2019    INR 2.30 (H) 01/09/2019     No results found for: OOOCFUON18XU  Lab Results   Component Value Date    TSH 2.08 08/04/2015           ASSESSMENT/PLAN:    1. Atrial fibrillation: Rate controlled in 70s. No chest pain or palpitations. On digoxin, metoprolol, warfarin. Anticoagulation clinic now managing warfarin in house. INR in 2 weeks.   2. HTN: SBP 140s. On metoprolol. Appears stable.   3. S/p pacemaker: Follows with cardiology, [facility]. No concerns.   4. Pulmonary hypertension, Dyspnea: Only with extreme exertion. No chest pain or recent concerns. Follows with cardiology.   5. Dependent edema: No edema again today. Will dc torsemide after risk v benefit discussion. Weight 162lbs and stable.   6. H/o prostate cancer: No recent issues. On flomax. Radiation seeding in 2004. No recurrence. F/u with urology annually.   7. Lumbar stenosis, chronic back pain:  No bowel or bladder incontinence, no numb/tingling ble. Worsening pain recently, only taking tylenol regular strength intermittently. Will schedule 1000mg two times a day due to increased pain.   8. Cognitive impairment, short term memory issues: On namenda 7mg xr at bedtime and monitor.   9. H/o CVA: No residual deficits but does have issues with memory which may or may not be related. Possibly vascular dementia component. CT head in may of  2018 showed increase in periventricular low attenuation when compared to 8/22/2009 suggesting progress of chronic microvascular ischemic changes. On warfarin. No benefit of statin given age and risk v benefit.   10. Macular degeneration, intraocular hemorrhage: Follows with Dr. Bill Figueroa. On atropine and prednisolone gtts. No injections as of yet.   11. Constipation: On senna daily, miralax daily prn. Not much improvement so will increase senna to 2 tabs at bedtime.   12. Vitamin b 12 deficiency: On vitamin b 12 daily.     Labs recently checked in February 2019, b12 level in October 2018.     Electronically signed by: Delmi Regan NP

## 2021-06-25 NOTE — PROGRESS NOTES
Progress Notes by Janes Marie MD at 6/9/2017 10:05 AM     Author: Janes Marie MD Service: -- Author Type: Physician    Filed: 6/9/2017  9:58 PM Encounter Date: 6/9/2017 Status: Signed    : Janes Marie MD (Physician)              [facility]    Comprehensive and complex medical care - Chronic disease management - Shared decision making - Care coordination - Compassionate care    Patient advocacy - Rational deprescribing - Minimally disruptive medicine - Ethical focus - Customized care    Date of Service: 6/9/2017  Primary Provider: Janes Marie MD    Patient Care Team:  Janes Marie MD as PCP - General (Internal Medicine)  William Corbett MD as Physician (Cardiology)  Cam Weston MD as Physician (Urology)     ______________________________________________________________________     Patient's Pharmacy:    [id number] - [place], [place] [facility] [address] [place] [address]  Phone: [phone] Fax: [phone]     Patient's Insurance:    Payor: UCARE / Plan: ARE FOR SENIORS / Product Type: MEDICARE ADVANTAGE /     ______________________________________________________________________     Raymondelena Zheng is 89 y.o. male who comes in today for:    Chief Complaint   Patient presents with   ? Follow-up     4 mos FU, no concerns, INR requested by Nai       Patient Active Problem List   Diagnosis   ? Prostate cancer - 2002 - [place] 7 - Treated with radiation brachytherapy. Followed by [facility].   ? Pacemaker   ? Atrial fibrillation   ? Vitamin B12 deficiency   ? Knee osteoarthritis   ? CVA (cerebral infarction)   ? HTN (hypertension)   ? Former smoker   ? DNR (do not resuscitate)     Current Outpatient Prescriptions   Medication Sig   ? cyanocobalamin (VITAMIN B-12) 1000 MCG tablet Take 1,000 mcg by mouth daily.   ? digoxin (LANOXIN) 125 mcg tablet TAKE ONE TABLET BY MOUTH DAILY   ? diltiazem  "(CARDIZEM CD) 180 MG 24 hr capsule TAKE ONE CAPSULE BY MOUTH ONE TIME DAILY   ? FOLIC ACID/MV,FE,MIN (CENTRUM ORAL) Take 1 tablet by mouth daily.    ? gabapentin (NEURONTIN) 100 MG capsule Take 100 mg by mouth at bedtime as needed.   ? metoprolol succinate (TOPROL-XL) 25 MG Take 1 tablet (25 mg total) by mouth daily.   ? nitroglycerin (NITROSTAT) 0.4 MG SL tablet Place 0.4 mg under the tongue as needed for chest pain.   ? tamsulosin (FLOMAX) 0.4 mg Cp24 Take 1 capsule (0.4 mg total) by mouth daily.   ? warfarin (COUMADIN) 2.5 MG tablet Take one-half to 1 tablet (1.25mg-2.5mg ) by mouth daily as directed. Adjust dose as directed per INR results.     Social History     Social History Narrative     ______________________________________________________________________     History of present illness:    The patient comes in today with his wife.    We reviewed his high blood pressure.  His blood pressure is doing well without issue.  He has no problems with dizziness at this time.    We reviewed his atrial fibrillation.  He remains on warfarin.  He has had no complications from it.  His rate control has been good.  We will be due to do his full blood work in October.    He does confirm that his CODE STATUS is DNR.    He recently saw urology and his prostate cancer is doing well.  He has had no signs of recurrence of prostate infection.    On review of systems, the patient denies any chest pain or shortness of breath.    ______________________________________________________________________    Exam:    Wt Readings from Last 3 Encounters:   06/09/17 158 lb 3.2 oz (71.8 kg)   05/02/17 161 lb (73 kg)   04/27/17 162 lb (73.5 kg)     BP Readings from Last 3 Encounters:   06/09/17 132/68   05/02/17 124/82   04/27/17 140/84     /68  Pulse 68  Ht 5' 10\" (1.778 m)  Wt 158 lb 3.2 oz (71.8 kg)  BMI 22.7 kg/m2   The patient is comfortable, no acute distress.  Mood good.  Insight good.  Eyes are nonicteric.  Neck is supple " without mass.  No cervical adenopathy.  No thyromegaly. Heart irregular rate and rhythm.  Lungs clear to auscultation bilaterally.  Respiratory effort is good.  Abdomen soft and nontender.  No hepatosplenomegaly.  Extremities no edema.      ______________________________________________________________________    Diagnostics:    Results for orders placed or performed in visit on 06/09/17   INR   Result Value Ref Range    INR 2.60 (H) 0.90 - 1.10      ______________________________________________________________________    Assessment:    1. HTN (hypertension)    2. Chronic atrial fibrillation    3. Prostate cancer - 2002 - [place] 7 - Treated with radiation brachytherapy. Followed by [facility].    4. DNR (do not resuscitate)       ______________________________________________________________________      Lab Results   Component Value Date    LDLCALC 74.8 06/28/2011       PHQ-2 Total Score: 0 (10/17/2016  8:00 AM)  No Data Recorded    Plan:    PHQ2 due at next visit.   No changes in his regimen.  Follow-up for physical in October.  He will be scheduled concurrently with his wife.    Janes Marie MD  General Internal Medicine  [facility]     Return in about 4 months (around 10/9/2017) for physical.

## 2021-06-25 NOTE — PROGRESS NOTES
Progress Notes by Alejandro Smith at 5/2/2017 10:20 AM     Author: Alejandro Smith Service: -- Author Type: Nurse Practitioner    Filed: 5/4/2017 12:11 AM Encounter Date: 5/2/2017 Status: Signed    : [facility]    Date of Service: 5/2/2017  Primary Provider: Janes Marie MD    Patient Care Team:  Janes Marie MD as PCP - General (Internal Medicine)  William Corbett MD as Physician (Cardiology)  Cam Weston MD as Physician (Urology)     ______________________________________________________________________     Patient's Pharmacy:    [facility] [id number] [facility] - [place], [place] - [address] [place] [address]  Phone: [phone] Fax: [phone]     Patient's Insurance:    Payor: UCARE / Plan: ARE FOR SENIORS / Product Type: MEDICARE ADVANTAGE /     ______________________________________________________________________    Assessment:    1. Blister of toe of left foot, initial encounter       ______________________________________________________________________      PHQ-2 Total Score: 0 (10/17/2016  8:00 AM)       Plan:  Patient Instructions   1. Small bandage over left great toe blister  2. May use cotton ball between toes  3. Wear thicker socks  4. Try wearing softer shoe that doesn't rub against the top of your toes.  5. Watch for signs of skin infection in the event the blister were to rupture. Discussed with patient.    ______________________________________________________________________     Raymond Zheng is 89 y.o. male who comes in today for:    Chief Complaint   Patient presents with   ? Toe infection     Right great toe infection. Some pain when standing on it or applying pressure. No change in color. No redness.        Patient Active Problem List   Diagnosis   ? Prostate cancer - 2002 - Jones 7 - Treated with radiation brachytherapy. Followed by [facility].   ?  Pacemaker   ? Atrial fibrillation   ? Vitamin B12 deficiency   ? Knee osteoarthritis   ? CVA (cerebral infarction)   ? HTN (hypertension)   ? Former smoker   ? DNR (do not resuscitate)     Current Outpatient Prescriptions   Medication Sig   ? cyanocobalamin (VITAMIN B-12) 1000 MCG tablet Take 1,000 mcg by mouth daily.   ? digoxin (LANOXIN) 125 mcg tablet TAKE ONE TABLET BY MOUTH DAILY   ? diltiazem (CARDIZEM CD) 180 MG 24 hr capsule TAKE ONE CAPSULE BY MOUTH ONE TIME DAILY   ? FOLIC ACID/MV,FE,MIN (CENTRUM ORAL) Take 1 tablet by mouth daily.    ? gabapentin (NEURONTIN) 100 MG capsule Take 100 mg by mouth at bedtime as needed.   ? metoprolol succinate (TOPROL-XL) 25 MG Take 1 tablet (25 mg total) by mouth daily.   ? nitroglycerin (NITROSTAT) 0.4 MG SL tablet Place 0.4 mg under the tongue as needed for chest pain.   ? tamsulosin (FLOMAX) 0.4 mg Cp24 Take 1 capsule (0.4 mg total) by mouth daily.   ? warfarin (COUMADIN) 2.5 MG tablet Take one-half to 1 tablet (1.25mg-2.5mg ) by mouth daily as directed. Adjust dose as directed per INR results.     Social History     Social History   ? Marital status:      Spouse name: Sydni   ? Number of children: 0   ? Years of education: N/A     Occupational History   ?  Retired     Social History Main Topics   ? Smoking status: Former Smoker   ? Smokeless tobacco: Not on file   ? Alcohol use 1.5 oz/week     3 drink(s) per week   ? Drug use: Not on file   ? Sexual activity: Not on file     Other Topics Concern   ? Not on file     Social History Narrative     ______________________________________________________________________     History of present illness: Raymond Zheng is a pleasant 89 y.o. male who presents in clinic today, accompanied by his wife, for evaluation of possible right toe infection.  He states that he has noticed increased redness and swelling of the top of his left Great toe and 3rd toe.  He is wearing firm, black leather shoes that appear to rub/create  "friction on the top portion of his left Great toe and 3rd toe. He denies pain or difficulty ambulating due to this.  He denies any history of gout or diabetes.    Review of systems:   On ROS, the patient denies fever, shortness of breath, chest pain, numbness or tingling in LE's, or foot/toe pain.  Positive for symptoms as noted in the HPI.    ______________________________________________________________________    Wt Readings from Last 3 Encounters:   05/02/17 161 lb (73 kg)   04/27/17 162 lb (73.5 kg)   02/10/17 157 lb 12.8 oz (71.6 kg)     BP Readings from Last 3 Encounters:   05/02/17 124/82   04/27/17 140/84   02/10/17 128/74     /82  Pulse 62  Ht 5' 10\" (1.778 m)  Wt 161 lb (73 kg)  BMI 23.1 kg/m2     Physical Exam:  General Appearance: Alert, cooperative, no distress, appears stated age  Lungs: Clear to auscultation bilaterally, respirations unlabored  Heart: Regular rate and rhythm, S1 and S2 normal, no murmur, rub, or gallop  Extremities: Extremities normal, atraumatic, no cyanosis or edema; Left Great toe with erythematous blister, approximately 1.5 cm in diameter, mildly fluctuant.  The 3rd toe on left foot is moderately erythematous at the DIP joint.  Skin: Skin color, texture, turgor normal, no rashes or lesions  Lymph nodes: Cervical, supraclavicular, and axillary nodes normal  Neurologic: He is alert. He has normal reflexes.   Psychiatric: He has a normal mood and affect.       Alejandro Smith, [facility]  Internal Medicine  [facility]     Return if symptoms worsen or fail to improve.        "

## 2021-06-25 NOTE — PROGRESS NOTES
Progress Notes by Alejandro Smith at 8/25/2017 10:40 AM     Author: Alejandro Smith Service: -- Author Type: Nurse Practitioner    Filed: 9/4/2017 10:53 PM Encounter Date: 8/25/2017 Status: Signed    : [facility]    Date of Service: 8/25/2017  Primary Provider: Janes Marie MD    Patient Care Team:  Janes Marie MD as PCP - General (Internal Medicine)  William Corbett MD as Physician (Cardiology)  Cam Weston MD as Physician (Urology)     ______________________________________________________________________     Patient's Pharmacy:    [facility] [id number] [facility] [employer] - [place], [place] [address] [place] [address]  Phone: [phone] Fax: [phone]     Patient's Insurance:    Payor: UCARE / Plan: [facility] FOR SENIORS / Product Type: MEDICARE ADVANTAGE /     ______________________________________________________________________    Assessment:    1. Intermittent diarrhea    2. CHF (congestive heart failure)    3. A-fib       ______________________________________________________________________      PHQ-2 Total Score: 0 (10/17/2016  8:00 AM)     Plan:  Patient Instructions   1. Labs ordered at today's visit:  - Basic Metabolic Panel  - HM2(CBC w/o Differential)  - Digoxin (Lanoxin )    2. May use Imodium for loose stools/diarrhea as directed.    3. Hold dulcolax for now.      Total of 25 minutes or greater were spent with the patient today with greater than 50% of the times spent in counseling and coordination of care.    ______________________________________________________________________     Raymond MICHAEL Zheng is 89 y.o. male who comes in today for:    Chief Complaint   Patient presents with   ? Diarrhea     Has been going through diarrhea and constipation for 1 week. Eating a lot of sweets. Chocolate. NO abd pain. NO blood in stool. No fever.        Patient Active Problem List   Diagnosis   ?  Prostate cancer - 2002 - [facility] 7 - Treated with radiation brachytherapy. Followed by [facility].   ? Pacemaker   ? Atrial fibrillation   ? Vitamin B12 deficiency   ? Knee osteoarthritis   ? CVA (cerebral infarction)   ? HTN (hypertension)   ? Former smoker   ? DNR (do not resuscitate)     Past Medical History:   Diagnosis Date   ? Atrial fibrillation    ? CVA (cerebral infarction)    ? DNR (do not resuscitate) 10/18/2016   ? Former smoker    ? HTN (hypertension)    ? Knee osteoarthritis    ? Pacemaker    ? Prostate cancer     Treated with radiation brachytherapy. Followed by [facility].     ? Vitamin B12 deficiency    ? Vitiligo      Past Surgical History:   Procedure Laterality Date   ? APPENDECTOMY          ? HERNIA REPAIR          ? LAMINECTOMY          ? PARTIAL GASTRECTOMY            Allergies   Allergen Reactions   ? Ciprofloxacin    ? Erythromycin Base    ? Oxycodone-Acetaminophen    ? Sulfa (Sulfonamide Antibiotics)      Current Outpatient Prescriptions   Medication Sig   ? cyanocobalamin (VITAMIN B-12) 1000 MCG tablet Take 1,000 mcg by mouth daily.   ? digoxin (LANOXIN) 125 mcg tablet TAKE ONE TABLET BY MOUTH DAILY   ? diltiazem (CARDIZEM CD) 180 MG 24 hr capsule TAKE ONE CAPSULE BY MOUTH ONE TIME DAILY   ? FOLIC ACID/MV,FE,MIN (CENTRUM ORAL) Take 1 tablet by mouth daily.    ? gabapentin (NEURONTIN) 100 MG capsule Take 100 mg by mouth at bedtime as needed.   ? metoprolol succinate (TOPROL-XL) 25 MG Take 1 tablet (25 mg total) by mouth daily.   ? nitroglycerin (NITROSTAT) 0.4 MG SL tablet Place 0.4 mg under the tongue as needed for chest pain.   ? tamsulosin (FLOMAX) 0.4 mg Cp24 TAKE 1 CAPSULE BY MOUTH DAILY.   ? warfarin (COUMADIN) 2.5 MG tablet Take one-half to 1 tablet (1.25mg-2.5mg ) by mouth daily as directed. Adjust dose as directed per INR results.     Social History     Social History Narrative    .  Lives in home with wife.        Former .  "    ______________________________________________________________________     History of present illness: Raymond Zheng is a pleasant 89 y.o. male with a PMH pertinent for atrial fibrillation on chronic anticoagulation (warfarin), s/p PPM, HTN, hx CVA, hx prostate cancer - s/p radiation brachytherapy, vitamin B-12 deficiency, and OA knees who presents in clinic today, accompanied by his wife, for evaluation of intermittent constipation and diarrhea for the past week. He has been eating a lot of different foods including Snickers bars, Sara bars, fruits, but also meat, potatoes, corn.  He does take dulcolax for his constipation and his last dose was about 2 days ago.  He also has Imodium that he will take occasionally for loose stools, but has not taken any yet.  His diarrhea seems to be the worst at night-time.  He denies fevers, chills, abdominal pain, N/V, flatus, blood in his stools or flu-like symptoms.    Review of systems:   10 point review of systems is negative unless noted in the HPI.    ______________________________________________________________________    Wt Readings from Last 3 Encounters:   08/25/17 155 lb (70.3 kg)   08/14/17 157 lb (71.2 kg)   07/19/17 157 lb (71.2 kg)     BP Readings from Last 3 Encounters:   08/25/17 122/74   08/14/17 130/80   07/19/17 128/72     /74  Pulse 74  Ht 5' 10\" (1.778 m)  Wt 155 lb (70.3 kg)  BMI 22.24 kg/m2     Physical Exam:  General Appearance: Alert, cooperative, no distress, appears stated age  Head: Normocephalic, without obvious abnormality, atraumatic  Eyes: PERRL, conjunctiva/corneas clear, wears glasses  Throat: Lips, mucosa, and tongue normal; no erythema, exudate, or thrush  Neck: Supple, symmetrical, trachea midline, no adenopathy  Lungs: Clear to auscultation bilaterally, respirations unlabored  Heart: Irregular rate and rhythm, S1 and S2 normal, no murmur, rub, or gallop  Abdomen: Soft, non-tender, bowel sounds active all four quadrants,  no " masses, no organomegaly  Extremities: Extremities normal, atraumatic, no cyanosis, trace bilateral LE edema  Skin: Skin color, texture, turgor normal, no rashes or lesions  Lymph nodes: Cervical & supraclavicular nodes normal  Neurologic: He is alert & oriented x 3. He has normal, slow gait.   Psychiatric: He has a normal mood and affect.     Diagnostics:  Results for orders placed or performed in visit on 08/25/17   Basic Metabolic Panel   Result Value Ref Range    Sodium 134 (L) 136 - 145 mmol/L    Potassium 4.7 3.5 - 5.0 mmol/L    Chloride 100 98 - 107 mmol/L    CO2 25 22 - 31 mmol/L    Anion Gap, Calculation 9 5 - 18 mmol/L    Glucose 97 70 - 125 mg/dL    Calcium 10.2 8.5 - 10.5 mg/dL    BUN 15 8 - 28 mg/dL    Creatinine 1.01 0.70 - 1.30 mg/dL    GFR MDRD Af Amer >60 >60 mL/min/1.73m2    GFR MDRD Non Af Amer >60 >60 mL/min/1.73m2   HM2(CBC w/o Differential)   Result Value Ref Range    WBC 8.3 4.0 - 11.0 thou/uL    RBC 4.14 (L) 4.40 - 6.20 mill/uL    Hemoglobin 13.3 (L) 14.0 - 18.0 g/dL    Hematocrit 39.4 (L) 40.0 - 54.0 %    MCV 95 80 - 100 fL    MCH 32.2 27.0 - 34.0 pg    MCHC 33.8 32.0 - 36.0 g/dL    RDW 11.3 11.0 - 14.5 %    Platelets 335 140 - 440 thou/uL    MPV 7.9 7.0 - 10.0 fL   Digoxin (Lanoxin )   Result Value Ref Range    Digoxin 0.6 0.5 - 2.0 ng/mL   INR   Result Value Ref Range    INR 1.70 (H) 0.90 - 1.10       Alejandro Smith, FADUMO  Internal Medicine  [facility]     Return for as needed.

## 2021-06-25 NOTE — PROGRESS NOTES
Progress Notes by Janes Marie MD at 2/10/2017 10:55 AM     Author: Janes Marie MD Service: -- Author Type: Physician    Filed: 2/10/2017  1:07 PM Encounter Date: 2/10/2017 Status: Signed    : Janes Marie MD (Physician)              [facility]    Comprehensive and complex medical care - Chronic disease management - Shared decision making - Care coordination - Compassionate care    Patient advocacy - Rational deprescribing - Minimally disruptive medicine - Ethical focus - Customized care    Date of Service: 2/10/2017  Primary Provider: Janes Marie MD    Patient Care Team:  Janes Marie MD as PCP - General (Internal Medicine)  William Corbett MD as Physician (Cardiology)  Cam Weston MD as Physician (Urology)     ______________________________________________________________________     Patient's Pharmacy:    [id number] [facility] - [place], [place] - [address] [place] [address]  Phone: [phone] Fax: [phone]     Patient's Insurance:    Payor: UCARE / Plan: ARE FOR SENIORS / Product Type: MEDICARE ADVANTAGE /     ______________________________________________________________________     Raymond Zheng is 89 y.o. male who comes in today for:    Chief Complaint   Patient presents with   ? Follow-up     Needs INR checked; med check, Tamsulosin       Patient Active Problem List   Diagnosis   ? Prostate cancer - 2002 - [facility] 7 - Treated with radiation brachytherapy. Followed by [facility].   ? Pacemaker   ? Atrial fibrillation   ? Vitamin B12 deficiency   ? Knee osteoarthritis   ? CVA (cerebral infarction)   ? HTN (hypertension)   ? Former smoker   ? DNR (do not resuscitate)      Current Outpatient Prescriptions   Medication Sig   ? cyanocobalamin (VITAMIN B-12) 1000 MCG tablet Take 1,000 mcg by mouth daily.   ? digoxin (LANOXIN) 125 mcg tablet TAKE ONE TABLET BY MOUTH DAILY NEEDS TO BE  SEEN FOR FURTHER REFILLS.   ? diltiazem (CARDIZEM CD) 180 MG 24 hr capsule TAKE ONE CAPSULE BY MOUTH ONE TIME DAILY   ? FOLIC ACID/MV,FE,MIN (CENTRUM ORAL) Take by mouth.   ? metoprolol succinate (TOPROL-XL) 25 MG TAKE ONE TABLET BY MOUTH ONE TIME DAILY   ? nitroglycerin (NITROSTAT) 0.4 MG SL tablet Place 0.4 mg under the tongue as needed for chest pain.   ? tamsulosin (FLOMAX) 0.4 mg Cp24 Take 1 capsule (0.4 mg total) by mouth daily.   ? warfarin (COUMADIN) 2.5 MG tablet TAKE 1 TABLET BY MOUTH EVERY SUN, TUES AND THURS AND 1/2 TABLET ON ALL OTHER DAYS or take as directed per INR results     Social History     Social History Narrative     ______________________________________________________________________     History of present illness:    The patient comes in today with his wife.    We mainly had him come back for follow-up of his suprapubic discomfort.  He was treated for possible prostatitis.  This did come back but he is not taking antibiotics at this time.  We did decide to keep him on tamsulosin in case this was due to some bladder outlet obstruction with incomplete bladder emptying.  This seems to have helped him at this time and the suprapubic discomfort is not present.  He is scheduled to follow-up with Dr. Weston in May.  He has no reason at this time to consider seeing him sooner in his mind.  He's had no fevers or chills.    We reviewed his atrial fibrillation and this is doing okay.  He remains on warfarin without any issue.    We reviewed his prostate cancer and issues with this.  He has had radioactive seed implantation in the past.    We reviewed his blood pressure has blood pressure is doing well he's had no dizziness.  He takes his medications regularly in the evening.    He has had some leg cramps in both legs.  It's in the calves.  It's mainly at night when he first goes to sleep.  He has to walk around to get better.  It has been going on for last 2 months while he has been off and on the  antibiotic (Levaquin).    He's had occasional diarrhea as well maybe about 2 times a week since being on the antibiotic, but this has not worsened.    On review of systems, the patient denies any chest pain or shortness of breath.    ______________________________________________________________________    Exam:    Wt Readings from Last 3 Encounters:   02/10/17 157 lb 12.8 oz (71.6 kg)   01/31/17 153 lb (69.4 kg)   01/20/17 155 lb 9.6 oz (70.6 kg)     BP Readings from Last 3 Encounters:   02/10/17 128/74   01/31/17 134/77   01/20/17 130/78     Visit Vitals   ? /74 (Patient Site: Left Arm, Patient Position: Sitting, Cuff Size: Adult Regular)   ? Pulse 72   ? Wt 157 lb 12.8 oz (71.6 kg)   ? SpO2 94%   ? BMI 23.47 kg/m2       The patient is comfortable, no acute distress.  Mood good.  Insight fair to good.  Eyes are nonicteric.  Neck is supple without mass.  No cervical adenopathy.  No thyromegaly. Heart regular rate and rhythm.  Lungs clear to auscultation bilaterally.  Respiratory effort is good.  Abdomen soft and nontender.  No hepatosplenomegaly.  Extremities no edema.    ______________________________________________________________________    Assessment:    1. Suprapubic pain    2. Chronic atrial fibrillation    3. Intermittent diarrhea    4. Leg cramps    5. Prostate cancer - 2002 - Jones 7 - Treated with radiation brachytherapy. Followed by [facility].    6. HTN (hypertension)       ______________________________________________________________________     PHQ-2 Total Score: 0 (10/17/2016  8:00 AM)  No Data Recorded      Plan:    1. Continue tamsulosin.  2. Follow-up with me in 4 months if otherwise doing well.  3. Consider C. diff culture if diarrhea worsens.  4. Continue current blood pressure medications.  5. No additional treatment for leg cramps at this time, hopefully this will improve off of the Levaquin as will the diarrhea with time.       Janes Marie MD  General Internal  [facility]     Personal office fax - [phone]  Voice mail - [phone]  E-mail - [email or web address]    Return in about 4 months (around 6/10/2017) for follow up visit.

## 2021-06-25 NOTE — PROGRESS NOTES
"Progress Notes by Janes Marie MD at 11/10/2017 10:55 AM     Author: Janes Marie MD Service: -- Author Type: Physician    Filed: 11/12/2017 11:02 PM Encounter Date: 11/10/2017 Status: Signed    : Janes Marie MD (Physician)              [facility]    Comprehensive and complex medical care - Chronic disease management - Shared decision making - Care coordination - Compassionate care    Patient advocacy - Rational deprescribing - Minimally disruptive medicine - Ethical focus - Customized care    ______________________________________________________________________     Date of Service: 11/10/2017  Primary Provider: Janes Marie MD    Patient Care Team:  Janes Marie MD as PCP - General (Internal Medicine)  William Corbett MD as Physician (Cardiology)  Cam Weston MD as Physician (Urology)     ______________________________________________________________________     Patient's Pharmacy:    [id number] - [place], [place] - [address] [place] [address]  Phone: [phone] Fax: [phone]     Patient's Contacts:  Name Home Phone Work Phone Mobile Phone Relationship [address] [phone]   DELORIS Narvaez [phone]   Spouse      Patient's Insurance:    Payor: UCARE / Plan: UCARE FOR SENIORS / Product Type: MEDICARE ADVANTAGE /     ______________________________________________________________________     Raymond Zheng is 89 y.o. male who comes in today for:    Chief Complaint   Patient presents with   ? Fall     was sick got up to get drink of water, lost balance, and hit arm and skin came off, bleeding is not bad, but has \"matter\" forming   ? Cough     getting better,, has been eating       Patient Active Problem List   Diagnosis   ? Prostate cancer - 2002 - [facility] - Treated with radiation brachytherapy. Followed by [facility].   ? Pacemaker   ? Atrial " fibrillation   ? Vitamin B12 deficiency   ? Knee osteoarthritis   ? CVA (cerebral infarction)   ? HTN (hypertension)   ? Former smoker   ? DNR (do not resuscitate)     Current Outpatient Prescriptions   Medication Sig   ? cyanocobalamin (VITAMIN B-12) 1000 MCG tablet Take 1,000 mcg by mouth daily.   ? digoxin (LANOXIN) 125 mcg tablet TAKE ONE TABLET BY MOUTH DAILY   ? diltiazem (CARDIZEM CD) 180 MG 24 hr capsule TAKE ONE CAPSULE BY MOUTH ONE TIME DAILY   ? FOLIC ACID/MV,FE,MIN (CENTRUM ORAL) Take 1 tablet by mouth daily.    ? gabapentin (NEURONTIN) 100 MG capsule Take 100 mg by mouth 3 (three) times a day.   ? metoprolol succinate (TOPROL-XL) 25 MG Take 1 tablet (25 mg total) by mouth daily.   ? nitroglycerin (NITROSTAT) 0.4 MG SL tablet Place 0.4 mg under the tongue as needed for chest pain.   ? tamsulosin (FLOMAX) 0.4 mg Cp24 TAKE 1 CAPSULE BY MOUTH DAILY.   ? warfarin (COUMADIN) 2.5 MG tablet Take 1/2-1 tablet (1.25-2.5 mg) by mouth daily as directed. Adjust dose per INR results.   ? diclofenac sodium (VOLTAREN) 1 % Gel Apply 1 application topically 3 (three) times a day. Each application should be 2-4 grams.     Social History     Social History Narrative    .  Lives in home with wife.        Former .     ______________________________________________________________________     History of present illness:    Patient comes in today for evaluation of the skin tear he had.  He lost his balance and fell.  He did scrape his arm on the counter and had a skin tear on the right forearm.  He rolled the skin with this.  He has not had any signs or symptoms of infection.  He denies any fevers or chills.  The function in his arm is stable.    We reviewed his A. fib and his Coumadin was reviewed today.  There is no major issues with this.    His blood pressure is doing well without issue.    We reviewed his fall and it was really just a loss of balance it was not a syncopal episode or presyncopal episode  for him.    We reviewed his history of CVA and he has had no new strokelike symptoms.    On review of systems, the patient denies any chest pain or shortness of breath.    ______________________________________________________________________    Exam:    Wt Readings from Last 3 Encounters:   11/10/17 159 lb (72.1 kg)   11/01/17 156 lb (70.8 kg)   11/01/17 155 lb (70.3 kg)     BP Readings from Last 3 Encounters:   11/10/17 124/78   11/01/17 152/84   11/01/17 (!) 170/92     /78  Pulse 60  Temp 97.4  F (36.3  C) (Oral)   Wt 159 lb (72.1 kg)  SpO2 95%  BMI 23.82 kg/m2   The patient is comfortable, no acute distress.  Mood good.  Insight good.  Eyes are nonicteric.  Neck is supple without mass.  No cervical adenopathy.  No thyromegaly. Heart irregular rate and rhythm.  Lungs clear to auscultation bilaterally.  Respiratory effort is good.  Abdomen soft and nontender.  No hepatosplenomegaly.  Extremities no edema.  There is a skin tear over the right forearm without signs of infection.      ______________________________________________________________________    Diagnostics:    Results for orders placed or performed in visit on 11/07/17   INR   Result Value Ref Range    INR 2.40 (H) 0.90 - 1.10   Basic Metabolic Panel   Result Value Ref Range    Sodium 131 (L) 136 - 145 mmol/L    Potassium 4.4 3.5 - 5.0 mmol/L    Chloride 99 98 - 107 mmol/L    CO2 24 22 - 31 mmol/L    Anion Gap, Calculation 8 5 - 18 mmol/L    Glucose 82 70 - 125 mg/dL    Calcium 9.8 8.5 - 10.5 mg/dL    BUN 15 8 - 28 mg/dL    Creatinine 0.84 0.70 - 1.30 mg/dL    GFR MDRD Af Amer >60 >60 mL/min/1.73m2    GFR MDRD Non Af Amer >60 >60 mL/min/1.73m2   Magnesium   Result Value Ref Range    Magnesium 1.8 1.8 - 2.6 mg/dL      ______________________________________________________________________    Assessment:    1. Skin tear of right forearm without complication    2. Chronic atrial fibrillation    3. HTN (hypertension)    4. Fall    5. Cerebral  infarction       ______________________________________________________________________      PHQ-2 Total Score: 0 (10/6/2017  9:00 AM)  No Data Recorded    Plan:    1. We reviewed skin care options related to the skin tear.  He should leave it open to air as well.  2. No changes in anticoagulation at this time.  3. Blood pressure currently doing well.    Janes Marie MD  General Internal Medicine  [facility]     Return if symptoms worsen or fail to improve.

## 2021-06-26 NOTE — PROGRESS NOTES
Progress Notes by Lily Noriega OT at 10/24/2018 10:30 AM     Author: Lily Noriega OT Service: -- Author Type: Occupational Therapist    Filed: 10/24/2018 12:55 PM Encounter Date: 10/24/2018 Status: Attested    : Lily Noriega OT (Occupational Therapist) Cosigner: Janes Marie MD at 10/24/2018  5:12 PM    Attestation signed by Janes Marie MD at 10/24/2018  5:12 PM    Agree with plan of care.  Follow therapist's recommendations.       Janes Marie MD  General Internal Medicine  [facility]                      [facility] Certification Request    October 24, 2018      Patient: Raymond Zheng  MR Number: [id number]  YOB: 1925  Date of Visit: 10/24/2018      Dear Dr. Janes Marie:    Thank you for this referral.   We are seeing Raymond Zheng in Occupational Therapy for memory difficulty.    Medicare and/or Medicaid requires physician review and approval of the treatment plan. Please review the plan of care and verify that you agree with the therapy plan of care by co-signing this note.    Plan of Care  Authorization / Certification Start Date: 10/24/18  Authorization / Certification End Date: 12/23/18  Authorization / Certification Number of Visits: 4  Communication with: Referral Source  Patient Related Instruction: Nature of Condition;Treatment plan and rationale;Basis of treatment;Expected outcome  Times per Week: every other week  Number of Weeks: 8  Number of Visits: 4  Functional Training (ADL's): ADL's;self care;compensatory training  Cognitive Skills Development:      Goals:  Pt will: complete cognitive screening to help determine appropriate living situation and need for services      If you have any questions or concerns, please don't hesitate to call.    Sincerely,      Lily Noriega OT        Physician recommendation:     ___ Follow therapist's recommendation        ___ Modify therapy      *Physician co-signature  indicates they certify the need for these services furnished within this plan and while under their care.      [facility]   ADL Initial Evaluation    Patient Name: Raymond RODRIGUEZ [facility]  Date of evaluation: 10/24/2018  Referral Diagnosis: memory difficulty  Referring provider: Janes Marie MD  Visit Diagnosis:     ICD-10-CM    1. Memory difficulty R41.3    2. Decreased activities of daily living (ADL) R68.89        Assessment:      Individual scores range along a continuum as outlined below.  In addition to cognitive status, other factors may affect safety in a home environment.  Please refer to specific recommendations for this patient.     Raymond scored a 4.6 on the LACL today. A score of 4.6 indicates a mild to moderate cognitive-functional disability. Patients at this level typically have significant deficits in working memory and executive thought processes. Judgment, reasoning and planning typically shows impairment.  Distractible with inability to shift attention/actions given competing stimuli.  Patient will likely have difficulty with problem solving and managing details. Complex daily tasks performed with inconsistency, difficulty, or error.  Patient 's at this level require daily assistance and check ins. Raymond lives with his wife so he is in an appropriate living environment for his current level of cognitive function.  Safety:  Medications should be monitored, stove use may require supervision, and driving ability may be affected.  Impaired safety awareness with inability to anticipate potential problems.  May not recognize or respond to emergent situations. Requires frequent check-in support.   ADL:  Mild difficulty with simple everyday self-care tasks. Benefits from structured, routine activity.  Will likely need reminders to complete tasks outside of the routine. Requires assistance with planning and IADL tasks like shopping and finances. Learns concrete tasks through repetition, but performance  "may not generalize. Tends to be impulsive with poor insight.   Driving: Patients at a level 4.6 should not be driving.    Goals:  Pt will: complete cognitive screening to help determine appropriate living situation and need for services    Patient's expectations/goals are realistic.    Barriers to Learning or Achieving Goals:  No Barriers.       Plan / Patient Instructions:        Plan of Care:   Authorization / Certification Start Date: 10/24/18  Authorization / Certification End Date: 18  Authorization / Certification Number of Visits: 4  Communication with: Referral Source  Patient Related Instruction: Nature of Condition;Treatment plan and rationale;Basis of treatment;Expected outcome  Times per Week: every other week  Number of Weeks: 8  Number of Visits: 4  Functional Training (ADL's): ADL's;self care;compensatory training  Cognitive Skills Development:      Plan for next visit: no additional visits needed     Subjective:        Social information:   Living Situation:single family home with his wife. They have no outside services.   Occupation:retired   Work Status:NA     History of Present Illness:    Raymond is a 89 y.o. male who presents to therapy today and states he's not sure why his wife sent him here. When I ask, he does admit to having \"a little trouble\" with his memory but states \" I think everyone does\".  Date of onset/duration of symptoms is 2018. Onset was gradual. Symptoms are getting worse.  He describes their previous level of function as not limited. Functional limitations are described as occurring with ADL's and IADL's..    Pain Ratin       Objective:      Note: Items left blank indicates the item was not performed or not indicated at the time of the evaluation.    Patient Outcome Measures:    Elvis [place] Cognitive Level Screen (_/6.0): 4.6    ADL Examination  1. Memory difficulty     2. Decreased activities of daily living (ADL)       Precautions/Restrictions: None          "                    Current level of function           I  SBA  MIN  MOD MAX DEP   Equipment   Transfers           Bed x         Toilet  x         Tub/shower x         Car  x         Bed mobility x         ADL's          Eating x         Grooming x         Bathing x         UE dressing x         LE dressing x         Toileting x         IADL's          Meal prep x         House work x         Laundry  x         Telephone x         Driving x                N/A   Finances x         Outdoor ADL x         Grocery shop x         Medication mgmt x         Writing x         Typing           Dynamic sitting x         Dynamic standing x           Cognitive Evaluation: 4.6    Treatment Today: no treatment today. Performed LACL. See above for full details.  TREATMENT MINUTES COMMENTS   Evaluation 35    Self-care/ Home management     Manual therapy     Neuromuscular Re-education     Therapeutic Exercises     Orthotic fitting           Total 35    Blank areas are intentional and mean the treatment did not include these items.     GOALS AND PLAN OF CARE WERE ESTABLISHED IN COOPERATION WITH THE PATIENT    OT Evaluation Code: (Please list factors)   Comorbidities: HTN, CVA, atrial fibrillation, prostate CA, pacemaker  Profile/History Review: Brief    Need for eval modification: No    # Treatment options: Limited    Clinical Decision Making:  Low      Occupational Profile/ Medical and Therapy History and Comorbidities Occupational Performance Clinical Decision Making   (Complexity)   brief history with review of medical/therapy records related to the presenting problem.  No comorbidities 1-3 Performance deficits that result in activity limitations and/or participation restrictions.    No Assessment Modification  Low complexity, which includes  problem-focused assessments, and consideration of a limited number of treatment options.      expanded review of medical/therapy records and additional review of physical, cognitive  and psychosocial history.    May have comorbidities 3-5 Performance deficits that result in activity limitations and/or participation restrictions.    Minimal to moderate modification of assessment Moderate complexity, which includes analysis of data from detailed assessments, and consideration of several treatment options.         Review of medical/therapy records and extensive additional review of physical, cognitive and psychosocial history.  Comorbidities affect occupational performance 5 or more Performance deficits that result in activity limitations and/or participation restrictions.    Significant modification of assessment High complexity, analysis of  Occupational profile and data,  Comprehensive assessments, multiple treatment options.          Lily Noriega  10/24/2018  10:31 AM

## 2021-06-26 NOTE — PROGRESS NOTES
Progress Notes by Adis Wang NP at 5/30/2018  1:30 PM     Author: Adis Wang NP Service: -- Author Type: Nurse Practitioner    Filed: 5/30/2018  2:47 PM Encounter Date: 5/30/2018 Status: Signed    : Adis Wang NP (Nurse Practitioner)           Click to link to [facility] Heart Care     [facility] HEART CARE NOTE      Assessment/Recommendations   Assessment:    1.  Hypertension: Blood pressure today is 114/66 and recheck was 130/80.  His home recorded blood pressure this morning was systolic in 140s.  Patient saw Dr. Marie, PCP on 5/22/2018 and his amlodipine dose was increased from 2.5-5 mg daily.  He has been tolerating the increased dose of amlodipine.  He has mild lower extremity edema which is his baseline.    2.  Chronic atrial fibrillation: On warfarin.  Most recent INR is 2.50 on 5/7/2018.  He is on digoxin 125 MCG, metoprolol succinate, and diltiazem. Wife found out he has not been taking his Diltiazem. Apparently med not refill since last year November?.  Therefore his metoprolol succinate dose was increased to 50 mg in a.m. and 25 mg in p.m given elevated blood pressure.  His heart rate is controlled in 70s today.    Plan:  1.  Continue same regimen of amlodipine 5 mg in a.m. metoprolol succinate 50 mg  in p.m. his a.m. metoprolol succinate 25 mg was discontinued to recently simplify his regimen as recommended by Dr. Marie. I discussed this with Dr. Corbett.    2.  Instructed patient to call if experiencing lightheaded or dizziness with low blood pressure.  Also encouraged to call if increased lower extremity edema.  Recommended DASH diet.  Monitor blood pressure twice a day 3-4 times a week and call if too low or too high blood pressure with symptoms    3.  Continue same dose of metoprolol succinate.  And on warfarin.    Follow-up with Dr. Corbett in October as per his recommendation. Follow up with Adis in 2 nd week of August 2018.     History of Present Illness     Raymond Zheng is a 89  y.o. years old  with a significant PMH of permanent atrial fibrillation, hypertension, CVA, status post pacemaker, and former smoker who is seen at [facility] for BP follow up.  He was recently sent to emergency due to elevated blood pressure and new onset of confusion.  He was given IV hydralazine for blood pressure and was discharged.  His lab work and head CT were unremarkable.His recent device check  showed few episodes of rapid ventricular response heart rate in 130s-180s with one episode of 4 beats NSVT but patient denies any symptoms associated with this episodes.    During his last clinic visit, found that the patient has not been taking his diltiazem.  Therefore, his metoprolol succinate dose was increased for blood pressure control.  He was also started on amlodipine 2.5 mg daily.  He saw Dr. Marie, PCP who increased his amlodipine to 5 mg daily. Dr. Marie suggested to simplify his metoprolol.  Therefore, his metoprolol dose was reduced to 50 mg once a day at bedtime and metoprolol succinate 25 mg was discontinued.    Today patient presented to the heart care clinic accompanied by his wife.  His blood pressure today is 114/66 and recheck was 130/80.  He denies experiencing any side effects from the amlodipine.  He has mild edema in his lower extremities which is unchanged from his baseline.  He denies fatigue, lightheadedness, shortness of breath, dyspnea on exertion, orthopnea, PND, palpitations, chest pain and abdominal fullness/bloating.      He has not been going for his weekly dance class due to some back issue.  However, he is been staying active going for walks and working in garden.     Physical Examination Review of Systems   Vitals:    05/30/18 1346   BP: 114/66   Pulse: 70   Resp: 16     Body mass index is 23.63 kg/(m^2).  Wt Readings from Last 3 Encounters:   05/30/18 160 lb (72.6 kg)   05/22/18 160 lb (72.6 kg)   05/15/18 159 lb (72.1 kg)       General Appearance:     Alert,  cooperative and in no acute distress.   ENT/Mouth: membranes moist, no oral lesions or bleeding gums.      EYES:  no scleral icterus, normal conjunctivae   Neck: no carotid bruits or thyromegaly   Chest/Lungs:   lungs are clear to auscultation, no rales or wheezing, respirations unlabored   Cardiovascular:   Heart rate regular. Normal first and second heart sounds with no murmurs, rubs, or gallops; the carotid, radial and posterior tibial pulses are intact, Jugular venous pressure flat with no  HJR, mild  edema bilateral lower extremities    Abdomen:  Soft, nontender, nondistended, bowel sounds present   Extremities: no cyanosis or clubbing   Skin: warm, dry.    Neurologic: mood and affect are appropriate, alert and oriented x3      General: WNL  Eyes: WNL  Ears/Nose/Throat: WNL  Lungs: WNL  Heart: WNL  Stomach: WNL  Bladder: WNL  Muscle/Joints: WNL  Skin: WNL  Nervous System: WNL  Mental Health: WNL     Blood: WNL     Medical History  Surgical History Family History Social History   Past Medical History:   Diagnosis Date   ? Anticoagulation goal of INR 2 to 3    ? Atrial fibrillation    ? CVA (cerebral infarction)    ? DNR (do not resuscitate) 10/18/2016   ? Former smoker    ? HTN (hypertension)    ? Knee osteoarthritis    ? Onychomycosis    ? Pacemaker    ? Prostate cancer     Treated with radiation brachytherapy. Followed by [facility].     ? Vitamin B12 deficiency    ? Vitiligo     Past Surgical History:   Procedure Laterality Date   ? APPENDECTOMY          ? HERNIA REPAIR          ? LAMINECTOMY          ? PARTIAL GASTRECTOMY           Family History   Problem Relation Age of Onset   ? Cancer Mother    ? Kidney disease Father    ? Heart disease Brother    ? Lung cancer Brother    ? Stroke Sister    ? Heart disease Sister     Social History     Social History   ? Marital status:      Spouse name: Sydni   ? Number of children: 0   ? Years of education: N/A     Occupational History   ?  Retired      Social History Main Topics   ? Smoking status: Former Smoker   ? Smokeless tobacco: Never Used   ? Alcohol use 1.5 oz/week     3 Standard drinks or equivalent per week   ? Drug use: Not on file   ? Sexual activity: Not on file     Other Topics Concern   ? Not on file     Social History Narrative    .  Lives in home with wife.        Former .          Medications  Allergies   Current Outpatient Prescriptions   Medication Sig Dispense Refill   ? amLODIPine (NORVASC) 2.5 MG tablet Take 2 tablets (5 mg total) by mouth daily. 90 tablet 0   ? cyanocobalamin (VITAMIN B-12) 1000 MCG tablet Take 1,000 mcg by mouth daily.     ? digoxin (LANOXIN) 125 mcg tablet TAKE ONE TABLET BY MOUTH DAILY 90 tablet 2   ? FOLIC ACID/MV,FE,MIN (CENTRUM ORAL) Take 1 tablet by mouth daily.      ? metoprolol succinate (TOPROL XL) 50 MG 24 hr tablet Take 1 tablet (50 mg total) by mouth daily. 90 tablet 3   ? nitroglycerin (NITROSTAT) 0.4 MG SL tablet Place 0.4 mg under the tongue as needed for chest pain.     ? tamsulosin (FLOMAX) 0.4 mg Cp24 TAKE 1 CAPSULE BY MOUTH DAILY. 90 capsule 1   ? warfarin (COUMADIN) 2.5 MG tablet Take 0.5-1 tablets (1.25-2.5 mg total) by mouth daily. Adjust dose per INR results as directed. 75 tablet 1   ? diclofenac sodium (VOLTAREN) 1 % Gel Apply 1 application topically 3 (three) times a day. Each application should be 2-4 grams. 1 Tube 3   ? gabapentin (NEURONTIN) 100 MG capsule Take 100 mg by mouth 3 (three) times a day. 90 capsule 3     No current facility-administered medications for this visit.       Allergies   Allergen Reactions   ? Ciprofloxacin    ? Erythromycin Base    ? Oxycodone-Acetaminophen    ? Sulfa (Sulfonamide Antibiotics)          Lab Results    Chemistry CBC BNP   Lab Results   Component Value Date    CREATININE 1.12 05/07/2018    BUN 18 05/07/2018     (L) 05/07/2018    K 4.5 05/07/2018    CL 99 05/07/2018    CO2 23 05/07/2018     Creatinine (mg/dL)   Date Value    05/07/2018 1.12   11/07/2017 0.84   08/25/2017 1.01   12/29/2016 1.22    Lab Results   Component Value Date    WBC 9.8 05/07/2018    HGB 12.4 (L) 05/07/2018    HCT 36.0 (L) 05/07/2018    MCV 94 05/07/2018     05/07/2018    Lab Results   Component Value Date     (H) 11/29/2013     BNP (pg/mL)   Date Value   11/29/2013 314 (H)        20  minutes were spent with the patient with greater than 50% spent on education and counseling.    Adis Wang, [facility]   Heart Failure Clinic           This note has been dictated using voice recognition software. Any grammatical, typographical, or context distortions are unintentional and inherent to the software

## 2021-06-26 NOTE — PROGRESS NOTES
Progress Notes by Janes Marie MD at 10/2/2018  1:50 PM     Author: Janes Marie MD Service: -- Author Type: Physician    Filed: 10/2/2018 11:03 PM Encounter Date: 10/2/2018 Status: Signed    : Janes Marie MD (Physician)            [facility]    Comprehensive and complex medical care - Chronic disease management - Shared decision making - Care coordination - Compassionate care    Patient advocacy - Rational deprescribing - Minimally disruptive medicine - Ethical focus - Customized care    ______________________________________________________________________     Date of Service: 10/2/2018  Primary Provider: Janes Marie MD    Patient Care Team:  Janes Marie MD as PCP - General (Internal Medicine)  William Corbett MD as Physician (Cardiology)  Cam Weston MD as Physician (Urology)  Adis Wang NP as Nurse Practitioner (Cardiology)  Bill Figueroa MD as Physician (Ophthalmology)   ______________________________________________________________________     Patient's Pharmacy:    [id number] - [place], [place] - [address] AT [facility] [address]  [address]  [place] [address]  Phone: [phone] Fax: [phone]     Patient's Contacts:  Name Home Phone Work Phone Mobile Phone Relationship [address] [phone]   Chuy    AMINAHSYDNI [phone]   Spouse      Patient's Insurance:    Payor: UCARE / Plan: UCARE FOR SENIORS / Product Type: MEDICARE ADVANTAGE /     Subjective:     History of present illness:    Raymond Zheng is an 89 y.o. male here for an annual wellness visit.    Patient comes in today with his wife Sydni.  He does the driving generally, and she does the finances.  They have friends that helped him but have limited help otherwise.    He recently had some problems with left blurry vision in his eyes.  He apparently had some evidence of bleeding internally  in the eye.  It is doing much better at this time.  We have not received any records from ophthalmology yet at this time.  His vision is doing better.  He will want to get his old records from Dr. Figueroa.      We reviewed his prostate cancer.  He follows with [facility] related to this.  He has had PSAs followed up on and is generally stable.  He is running in the 18 range for his PSA.  He has BPH and benefits from tamsulosin.    We reviewed his A. fib with history of stroke.  He remains on warfarin.  He does need an INR done today.  He denies any bleeding complications from his medication.    We reviewed his rate control and this seems to be doing good on the digoxin and the metoprolol.    We reviewed his high blood pressure and this seems to be doing well on the amlodipine.    He has had a little bit of lower extremity edema likely in part related to the amlodipine and the tamsulosin.  He denies any shortness of breath associated with it.    We reviewed his progressive memory loss.  This is become more an issue in the last year.  They were going into a exploration phase for possible assisted living, but they decided not to at this point.  Again, they do have limited support network.    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  .   We reviewed his other issues noted in the assessment but not specifically addressed in the HPI above.     Review of Systems:  The 10-system review of systems and health history form [facility] was completed by patient, reviewed by me, and pertinent problems are reviewed above.     ______________________________________________________________________     Patient Active Problem List   Diagnosis   ? Prostate cancer - 2002 - [place] 7 - Treated with radiation brachytherapy. Followed by [facility].   ? Pacemaker   ? Atrial fibrillation (H)   ? Vitamin B12 deficiency   ? Knee osteoarthritis   ? CVA (cerebral infarction)   ? HTN (hypertension)   ? Former smoker   ? DNR  (do not resuscitate)   ? Anticoagulation goal of INR 2 to 3   ? Low back pain   ? Multilevel foraminal stenosis, lumbar   ? 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 Anticoagulation goal of INR 2 to 3    ? Atrial fibrillation (H)    ? 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 CVA (cerebral infarction)    ? DNR (do not resuscitate) 10/18/2016   ? Former smoker    ? HTN (hypertension)    ? Knee osteoarthritis    ? Onychomycosis    ? Pacemaker    ? Prostate cancer (H)     Treated with radiation brachytherapy. Followed by [facility].     ? Vitamin B12 deficiency    ? Vitiligo       Past Surgical History:   Procedure Laterality Date   ? APPENDECTOMY          ? HERNIA REPAIR          ? LAMINECTOMY          ? PARTIAL GASTRECTOMY             Family History   Problem Relation Age of Onset   ? Cancer Mother    ? Kidney disease Father    ? Heart disease Brother    ? Lung cancer Brother    ? Stroke Sister    ? Heart disease Sister      Family history is otherwise noncontributory.     Social History     Social History   ? Marital status:      Spouse name: Sydni   ? Number of children: 0   ? Years of education: N/A     Occupational History   ?  Retired     Social History Main Topics   ? Smoking status: Former Smoker   ? Smokeless tobacco: Never Used   ? Alcohol use 1.5 oz/week     3 Standard drinks or equivalent per week   ? Drug use: None   ? Sexual activity: Not Asked     Other Topics Concern   ? None     Social History Narrative    .  Lives in home with wife.        Former .      Current Outpatient Prescriptions   Medication Sig   ? amLODIPine (NORVASC) 5 MG tablet Take 1 tablet (5 mg total) by mouth daily.   ? cyanocobalamin (VITAMIN B-12) 1000 MCG tablet Take 1,000 mcg by mouth daily.   ? digoxin (LANOXIN) 125 mcg tablet TAKE ONE TABLET BY MOUTH DAILY   ? FOLIC ACID/MV,FE,MIN (CENTRUM ORAL) Take 1 tablet by mouth  "daily.    ? metoprolol succinate (TOPROL XL) 50 MG 24 hr tablet Take 1 tablet (50 mg total) by mouth daily.   ? nitroglycerin (NITROSTAT) 0.4 MG SL tablet Place 0.4 mg under the tongue as needed for chest pain.   ? tamsulosin (FLOMAX) 0.4 mg Cp24 Take 1 capsule (0.4 mg total) by mouth daily.   ? warfarin (COUMADIN) 2.5 MG tablet Take 0.5-1 tablets (1.25-2.5 mg total) by mouth daily. Adjust dose per INR results as directed      Allergies: Ciprofloxacin; Erythromycin base; Oxycodone-acetaminophen; and Sulfa (sulfonamide antibiotics)     Immunization History   Administered Date(s) Administered   ? DT (pediatric) 01/01/1998   ? Influenza high dose, seasonal 09/30/2014, 10/02/2015, 10/03/2016, 09/14/2017, 10/02/2018   ? Influenza, Seasonal, Inj PF IIV3 09/20/2010   ? Influenza, inj, historic,unspecified 10/15/2007, 10/13/2008, 09/15/2009, 09/20/2010, 09/14/2017   ? Influenza, seasonal,quad inj 6-35 mos 01/04/2013, 09/27/2013   ? Influenza,seasonal, Inj IIV3 10/11/2004, 10/19/2005, 10/25/2006, 10/15/2007, 10/13/2008, 09/15/2009, 01/04/2013, 09/27/2013   ? Pneumo Conj 13-V (2010&after) 10/02/2018   ? Pneumo Polysac 23-V 10/09/2001, 09/30/2004   ? Td, Adult, Absorbed 08/26/2009   ? Td,adult,historic,unspecified 08/26/2009   ? ZOSTER, LIVE 03/16/2009      Objective:     Vital Signs:   Visit Vitals   ? /60 (Patient Site: Right Arm, Patient Position: Sitting, Cuff Size: Adult Regular)   ? Pulse 64   ? Resp 16   ? Ht 5' 9.25\" (1.759 m)   ? Wt 161 lb 3.2 oz (73.1 kg)   ? SpO2 98%   ? BMI 23.63 kg/m2      Wt Readings from Last 3 Encounters:   10/02/18 161 lb 3.2 oz (73.1 kg)   09/28/18 159 lb (72.1 kg)   08/14/18 154 lb (69.9 kg)     BP Readings from Last 3 Encounters:   10/02/18 120/60   09/28/18 138/60   08/14/18 126/54     Vision Screening:  No exam data present     PHYSICAL EXAM  Patient declines an undressed exam for today's physical.  The patient is comfortable, no acute distress.  Mood good.  Insight is fair.  No " skin lesions or nodules of concern.  Ears clear.  Eyes are nonicteric.  Pupils equal and reactive.  Throat is clear.  Neck is supple without mass, no thyromegaly. No cervical or epitrochlear adenopathy.  Heart irregular rate and rhythm.  Lungs clear to auscultation bilaterally.  Respiratory effort good.  Abdomen soft and nontender.  No hepatosplenomegaly.  Extremities show trace edema.  He has a mild intention tremor but no pill-rolling tremor at this visit.  He has no cogwheeling rigidity.  His gait is slightly slow.    Assessment Results 10/2/2018   Activities of Daily Living No help needed   Instrumental Activities of Daily Living 2-4 - Moderate impairment   Mini Cog Total Score 1   Some recent data might be hidden     A Mini-Cog score of 0-2 suggests the possibility of dementia, score of 3-5 suggests no dementia    Diagnostics:     Recent Results (from the past 240 hour(s))   CLINIC Device Check   Result Value Ref Range    Device Manufacture ALEX[employer]ALEXA     Device Model [id number] ADVANTIO     Device Serial Number [id number]     Device Type Pacemaker     Device Implanting Provider PRASHANTH Mayorga     Comments/Summary       Type: In clinic every 6 month visit. Seen with Dr. Corbett.   Presenting Rhythm: Atrial fibrillation with VS and occasional  and pseudofusion noted, 51bpm  Lead/Battery status: Stable  Arrhythmias: Presumed chronic AF (no RA lead). 45 NSVT, Review of NSVT detections show 4bt RVR. Pt is asymptomatic and does go dancing occasionally.   Anticoagulant: Warfarin.   Comments: Normal device function. No changes made. BK     INR   Result Value Ref Range    INR 2.10 (H) 0.90 - 1.10   HM2(CBC w/o Differential)   Result Value Ref Range    WBC 8.1 4.0 - 11.0 thou/uL    RBC 3.55 (L) 4.40 - 6.20 mill/uL    Hemoglobin 11.4 (L) 14.0 - 18.0 g/dL    Hematocrit 33.8 (L) 40.0 - 54.0 %    MCV 95 80 - 100 fL    MCH 32.1 27.0 - 34.0 pg    MCHC 33.7 32.0 - 36.0 g/dL    RDW 11.7 11.0 - 14.5 %    Platelets 403  140 - 440 thou/uL    MPV 8.1 7.0 - 10.0 fL   Digoxin (Lanoxin )   Result Value Ref Range    Digoxin 0.6 0.5 - 2.0 ng/mL   Comprehensive Metabolic Panel   Result Value Ref Range    Sodium 137 136 - 145 mmol/L    Potassium 4.6 3.5 - 5.0 mmol/L    Chloride 103 98 - 107 mmol/L    CO2 22 22 - 31 mmol/L    Anion Gap, Calculation 12 5 - 18 mmol/L    Glucose 98 70 - 125 mg/dL    BUN 24 8 - 28 mg/dL    Creatinine 1.16 0.70 - 1.30 mg/dL    GFR MDRD Af Amer >60 >60 mL/min/1.73m2    GFR MDRD Non Af Amer 59 (L) >60 mL/min/1.73m2    Bilirubin, Total 1.0 0.0 - 1.0 mg/dL    Calcium 10.3 8.5 - 10.5 mg/dL    Protein, Total 7.6 6.0 - 8.0 g/dL    Albumin 4.2 3.5 - 5.0 g/dL    Alkaline Phosphatase 136 (H) 45 - 120 U/L    AST 25 0 - 40 U/L    ALT 15 0 - 45 U/L   Iron and Transferrin Iron Binding Capacity   Result Value Ref Range    Iron 82 42 - 175 ug/dL    Transferrin 192 (L) 212 - 360 mg/dL    Transferrin Saturation, Calculated 34 20 - 50 %    Transferrin IBC, Calculated 240 (L) 313 - 563 ug/dL        ______________________________________________________________________     PHQ-2 Total Score: 0 (10/2/2018  2:00 PM)  No Data Recorded  ______________________________________________________________________      Assessment:     1. Medicare annual wellness visit, subsequent    2. Need for influenza vaccination    3. Memory difficulties    4. Chronic atrial fibrillation (H)    5. Normocytic anemia    6. Cerebral infarction (H)    7. Prostate cancer - 2002 - [place] 7 - Treated with radiation brachytherapy. Followed by [facility].    8. DNR (do not resuscitate)    9. 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 Knee osteoarthritis    11. HTN (hypertension)    12. Low back pain    13. Multilevel foraminal stenosis, lumbar    14. Pacemaker    15. Vitamin B12 deficiency    16. Visual loss, left eye         Plan:     1. Check blood work today.  See relevant orders and diagnosis associations at the bottom of this note.    2. Get old records from [facility].  3. Occupational therapy referral for memory evaluation.  We reviewed that there are NOT really great medications in relationship to this.  I suspect he has dementia.  I suspect this is Alzheimer's in nature.  4. I anticipate he will need more support services as time goes on.  5. Follow-up with urology as scheduled.  6. Consider reduction of medications in the future if needed for the edema and consider other testing if needed.  7. We will do some follow-up blood work in relationship to his anemia.  8. No changes in current medication.         Janes Marie MD  General Internal Medicine  [facility]     Personal office fax - [phone]   Voice mail - [phone]  E-mail - [email or web address]     Return in about 6 months (around 4/2/2019) for follow up visit.     Future Appointments  Date Time Provider Department Center   11/27/2018 10:10 AM [facility]       ______________________________________________________________________     Relevant ICD-10 codes and order associations:      ICD-10-CM    1. Medicare annual wellness visit, subsequent Z00.00    2. Need for influenza vaccination Z23 Influenza High Dose, Seasonal 65+ yrs   3. Memory difficulties R41.3 Ambulatory referral to Adult OT- Internal   4. Chronic atrial fibrillation (H) I48.2 HM2(CBC w/o Differential)     Digoxin (Lanoxin )     Comprehensive Metabolic Panel     INR   5. Normocytic anemia D64.9 Iron and Transferrin Iron Binding Capacity     Ferritin     Vitamin B12   6. Cerebral infarction (H) I63.9    7. Prostate cancer - 2002 - [place] 7 - Treated with radiation brachytherapy. Followed by [facility]. C61    8. DNR (do not resuscitate) Z66    9. 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 Knee osteoarthritis M17.10    11. HTN (hypertension) I10    12. Low back pain M54.5    13. Multilevel foraminal stenosis, lumbar M48.00    14. Pacemaker  Z95.0    15. Vitamin B12 deficiency E53.8    16. Visual loss, left eye H54.62         Identified Health Risks:     A personalized health plan based on the identified health risks was provided to the patient on the AVS.    ______________________________________________________________________     The following health maintenance schedule was reviewed with the patient and provided in printed form in the after visit summary:     Health Maintenance   Topic Date Due   ? TD 18+ HE  08/26/2019   ? FALL RISK ASSESSMENT  10/02/2019   ? ADVANCE DIRECTIVES DISCUSSED WITH PATIENT  06/09/2022   ? PNEUMOCOCCAL POLYSACCHARIDE VACCINE AGE 65 AND OVER  Completed   ? INFLUENZA VACCINE RULE BASED  Completed   ? ZOSTER VACCINE  Completed   ? PNEUMOCOCCAL CONJUGATE VACCINE FOR ADULTS (PCV13 OR PREVNAR)  Excluded        ______________________________________________________________________     The patient reports that he has difficulty with instrumental activities of daily living.  He was provided with a list of local organizations that provide support services and advised to make a follow up appointment in 52 weeks to address this further.   Patient's advanced directive was discussed and I am comfortable with the patient's wishes.        The patient was provided with appropriate referrals to address his memory problem.

## 2021-06-26 NOTE — PROGRESS NOTES
Progress Notes by Adis Wang NP at 5/15/2018 10:30 AM     Author: Adis Wang NP Service: -- Author Type: Nurse Practitioner    Filed: 5/15/2018  2:52 PM Encounter Date: 5/15/2018 Status: Signed    : Adis Wang NP (Nurse Practitioner)           Click to link to [facility] Heart Care     [facility] HEART CARE NOTE      Assessment/Recommendations   Assessment:    1.  Hypertension: Blood pressure today in the clinic 162/80 on right arm and 152/71 left arm.  On his home BP machine his blood pressure was 183/90 on right arm and 170-100 on left arm.  His blood pressure tends to run little bit higher on his home BP cuff.  He is asymptomatic except chronic fatigue which he and his wife says is unchanged from his baseline.    Patient was recently sent to ED for new onset of confusion and an elevated blood pressure.  His lab work and head CT were unremarkable.  He was given IV hydralazine and was discharged home.  Dr. Corbett has recommended patient to follow-up with PCP for progressive decline in his memory.  They do not have the appointment yet.      2. Chronic Atrial Fibrillation: Heart rate in mid 50s.  Per wife, his heart rates been staying mostly in low 60s-50s with one reading of 49.  He is currently on digoxin 125 MCG daily, diltiazem, and metoprolol succinate.  He is also on warfarin and INR has been therapeutic.    Plan:  1. Blood pressure medications include:       - Metoprolol succinate 25 mg in p.m. and 50 mg in a.m. (recently increased)       - Diltiazem 180 mg daily (not taking)     Wife called Lety Villalba (RN) and informed that he has not been taking his diltiazem.  We reviewed his prescription and found has not refilled his medication since Nov, 2017.  This could be the reason why his blood pressure is staying elevated.  Will discuss with Dr. Corbett if he should restart his diltiazem on a lower dose and slowly uptitrate or start him on a low dose of ACEI/ARB or CCB.  Wife will schedule follow-up  appointment with PCP regarding concern with ongoing decline in his cognitive function.  He tends to forget things very easily.  Patient thinks it is more related to his aging.  He still drives but always with his wife    2.  Continue digoxin 125 MCG daily and continue metoprolol succinate.  Recent digoxin level WNL. His recent device check showed chronic A. fib with occasional V paced with heart rate 51 bpm.  He had NSVT and tachy detection shows 4 beats NSVT with heart rate 130-1 80 bpm.However, patient reported no symptoms with these episodes.  Patient and his wife goes for dancing at least twice a week but has not done in the last 3 weeks.    Follow up with Dr. Corbett in October or sooner if needed. F/u with Chime as needed.     History of Present Illness     Raymond Zheng is a 89 y.o. years old  with a significant PMH of permanent atrial fibrillation, hypertension, CVA, status post pacemaker, and former smoker who is seen at [facility] for BP follow up per Dr. Corbett.    He was recently sent to emergency due to elevated blood pressure and new onset of confusion.  He was given IV hydralazine for blood pressure and was discharged.  His lab work and head CT were unremarkable.His recent device check  showed few episodes of rapid ventricular response heart rate in 130s-180s with one episode of 4 beats NSVT but patient denies any symptoms associated with this episodes.    Patient presented to the heart care clinic accompanied by his wife.  His blood pressure in the clinic today is 162/80 on right arm and 152/70 on right and left arm.  His blood pressure on his home BP cuff was 183/90 on right arm and 172/100 on the left arm.  He denies having any symptoms except chronic fatigue which is unchanged from his baseline.During the last clinic visit, his metoprolol dose was increased from 25 mg to 50 mg given high blood pressure Dr. Corbett recently further increased his metoprolol to 25 mg in a.m. and 50 mg in p.m.  He  denies lightheadedness, shortness of breath, dyspnea on exertion, orthopnea, PND, palpitations, chest pain, abdominal fullness/bloating and lower extremity edema.       He is following a low sodium diet.  He participates in regular physical activity including dancing twice a week and stays active at home doing simple household chores. Patient and wife planning to do derrick walking outside with good weather.    Addendum:  Wife called Lety Villalba (JUMA) and informed that he has not been taking his diltiazem.  We reviewed his prescription and found has not refilled his medication since Nov, 2017.  This could be the reason why his blood pressure is staying elevated. Wife will schedule follow-up appointment with PCP regarding concern with ongoing decline in his cognitive function. Per wife, he tends to forget things very easily.  Patient thinks it is more related to his aging.  He still drives but always with his wife     Device check on 4/25/2018  Type: In clinic every 6 month visit. Seen with Adis Wang RN CNP.  Presenting Rhythm: Atrial fibrillation with VS and occassional  51bpm  Lead/Battery status: Stable  Arrhythmias: Presumed chronic AF (no RA lead). 452 NSVT and 2 tachy detections. Review of NSVT and tachy detections show 4bt NSVT and RVR  130-180bpm. Pt is asymptomatic and does go dancing twice a week with his wife.  Anticoagulant: Warfarin.  Comments: Normal device function. No changes made.     Physical Examination Review of Systems   Vitals:    05/15/18 1047   BP: 152/70   Pulse:    Resp:      Body mass index is 23.48 kg/(m^2).  Wt Readings from Last 3 Encounters:   05/15/18 159 lb (72.1 kg)   05/07/18 165 lb (74.8 kg)   04/25/18 165 lb (74.8 kg)       General Appearance:     Alert, cooperative and in no acute distress.   ENT/Mouth: membranes moist, no oral lesions or bleeding gums.      EYES:  no scleral icterus, normal conjunctivae   Neck: no carotid bruits or thyromegaly   Chest/Lungs:   lungs are clear to  auscultation, no rales or wheezing, respirations unlabored   Cardiovascular:   S1-S2 with no murmurs, rubs, or gallops; the carotid, radial and posterior tibial pulses are intact, Jugular venous pressure flat but no HJR except trace edema bilateral lower extremities    Abdomen:  Soft, nontender, nondistended, bowel sounds present   Extremities: no cyanosis or clubbing   Skin: warm, dry.    Neurologic: mood and affect are appropriate, alert and oriented x3      General: WNL  Eyes: WNL  Ears/Nose/Throat: WNL  Lungs: WNL  Heart: WNL  Stomach: WNL  Bladder: WNL  Muscle/Joints: Muscle Weakness  Skin: WNL  Nervous System: WNL  Mental Health: Confusion     Blood: WNL     Medical History  Surgical History Family History Social History   Past Medical History:   Diagnosis Date   ? Anticoagulation goal of INR 2 to 3    ? Atrial fibrillation    ? CVA (cerebral infarction)    ? DNR (do not resuscitate) 10/18/2016   ? Former smoker    ? HTN (hypertension)    ? Knee osteoarthritis    ? Onychomycosis    ? Pacemaker    ? Prostate cancer     Treated with radiation brachytherapy. Followed by [facility].     ? Vitamin B12 deficiency    ? Vitiligo     Past Surgical History:   Procedure Laterality Date   ? APPENDECTOMY          ? HERNIA REPAIR          ? LAMINECTOMY          ? PARTIAL GASTRECTOMY           Family History   Problem Relation Age of Onset   ? Cancer Mother    ? Kidney disease Father    ? Heart disease Brother    ? Lung cancer Brother    ? Stroke Sister    ? Heart disease Sister     Social History     Social History   ? Marital status:      Spouse name: Sydni   ? Number of children: 0   ? Years of education: N/A     Occupational History   ?  Retired     Social History Main Topics   ? Smoking status: Former Smoker   ? Smokeless tobacco: Never Used   ? Alcohol use 1.5 oz/week     3 Standard drinks or equivalent per week   ? Drug use: Not on file   ? Sexual activity: Not on file     Other Topics Concern   ?  Not on file     Social History Narrative    .  Lives in home with wife.        Former .          Medications  Allergies   Current Outpatient Prescriptions   Medication Sig Dispense Refill   ? cyanocobalamin (VITAMIN B-12) 1000 MCG tablet Take 1,000 mcg by mouth daily.     ? diclofenac sodium (VOLTAREN) 1 % Gel Apply 1 application topically 3 (three) times a day. Each application should be 2-4 grams. 1 Tube 3   ? digoxin (LANOXIN) 125 mcg tablet TAKE ONE TABLET BY MOUTH DAILY 90 tablet 3   ? diltiazem (CARDIZEM CD) 180 MG 24 hr capsule TAKE ONE CAPSULE BY MOUTH ONE TIME DAILY 90 capsule 3   ? FOLIC ACID/MV,FE,MIN (CENTRUM ORAL) Take 1 tablet by mouth daily.      ? gabapentin (NEURONTIN) 100 MG capsule Take 100 mg by mouth 3 (three) times a day. 90 capsule 3   ? metoprolol succinate (TOPROL XL) 50 MG 24 hr tablet Take 1 tablet (50 mg total) by mouth daily. (Patient taking differently: Take 50 mg by mouth daily. Take 25 mg in the morning and 50 mg in the evening) 90 tablet 3   ? nitroglycerin (NITROSTAT) 0.4 MG SL tablet Place 0.4 mg under the tongue as needed for chest pain.     ? tamsulosin (FLOMAX) 0.4 mg Cp24 TAKE 1 CAPSULE BY MOUTH DAILY. 90 capsule 1   ? warfarin (COUMADIN) 2.5 MG tablet Take 0.5-1 tablets (1.25-2.5 mg total) by mouth daily. Adjust dose per INR results as directed. 75 tablet 1   ? benzonatate (TESSALON PERLES) 100 MG capsule Take 1 capsule (100 mg total) by mouth every 6 (six) hours as needed for cough. 15 capsule 0     No current facility-administered medications for this visit.       Allergies   Allergen Reactions   ? Ciprofloxacin    ? Erythromycin Base    ? Oxycodone-Acetaminophen    ? Sulfa (Sulfonamide Antibiotics)          Lab Results    Chemistry CBC BNP   Lab Results   Component Value Date    CREATININE 1.12 05/07/2018    BUN 18 05/07/2018     (L) 05/07/2018    K 4.5 05/07/2018    CL 99 05/07/2018    CO2 23 05/07/2018     Creatinine (mg/dL)   Date Value    05/07/2018 1.12   11/07/2017 0.84   08/25/2017 1.01   12/29/2016 1.22    Lab Results   Component Value Date    WBC 9.8 05/07/2018    HGB 12.4 (L) 05/07/2018    HCT 36.0 (L) 05/07/2018    MCV 94 05/07/2018     05/07/2018    Lab Results   Component Value Date     (H) 11/29/2013     BNP (pg/mL)   Date Value   11/29/2013 314 (H)          30  minutes were spent with the patient with greater than 50% spent on education and counseling.      Adis Wang, [facility]   Heart Failure Clinic           This note has been dictated using voice recognition software. Any grammatical, typographical, or context distortions are unintentional and inherent to the software

## 2021-06-26 NOTE — PROGRESS NOTES
Progress Notes by Adis Wang NP at 8/14/2018 10:30 AM     Author: Adis Wang NP Service: -- Author Type: Nurse Practitioner    Filed: 8/14/2018 10:56 AM Encounter Date: 8/14/2018 Status: Signed    : Adis Wang NP (Nurse Practitioner)           Click to link to [facility] HEART [facility] NOTE      Assessment/Recommendations   Assessment:    1. Hypertension: BP today is 126/54. Reports BP stable at home but did not bring the readings with him. Currently on Amlodipine and Metoprolol. Mild ankle swelling at baseline (unchanged)    2. Chroni Atrial Fibrillation: HR controlled in 60's on Metoprolol and Digoxin. Asymptomatic. On Warfarin and INR therapeutic.     3. H/o Tachybrady syndrome with s/p Pacemaker: Last device check in April 2018 showed few episodes of Afib with RVR and one episode of 4 beats NSVT (asymptomatic) likely on the days he went for dancing with his wife. (see device check report for detail).    Plan:  1. Continue same regimen for HTN. Recommended DASH diet and stay active as tolerated.    2. No change on  current Afib regimen.     3.  He has not had further device check since then and has no upcoming appointment. Will communicate this to device team.    Follow up with Dr. Corbett in December 2018 and Adis as needed.     History of Present Illness    Raymond Zheng is a 89 y.o. years old  with a significant PMH of permanent atrial fibrillation, hypertension, CVA, tachybrady syndrome with status post pacemaker, and former smoker who is seen at [facility] for follow up.  He was recently sent to emergency due to elevated blood pressure and new onset of confusion.  He was given IV hydralazine for blood pressure and was discharged.  His lab work and head CT were unremarkable.His recent device check showed few episodes of rapid ventricular response heart rate in 130s-180s with one episode of 4 beats NSVT but patient denies any symptoms associated with this  episodes. Apparently, patient has stopped taking diltiazem on his on. His metoprolol dose was increased for better BP control. He saw his PCP in between and had him increase his Amlodipine dose.     Today, patient reports that his BP has been good on current regiment. His clinic BP looks well controlled.   He denies fatigue, lightheadedness, shortness of breath, dyspnea on exertion, orthopnea, PND, palpitations, chest pain and abdominal fullness/bloating. He has mild LE edema which is unchanged from the baseline. His wife called PCP office with concern with HR nayanunf 49 but patient was asymptomatic.     He and his wife has not been going to dancing class due to the hot weather. They are planning to resume it in August. However, he is staying active and walks around his neighborhood.      Physical Examination Review of Systems   Vitals:    08/14/18 1007   BP: 126/54   Pulse: 64   Resp: 12     Body mass index is 22.74 kg/(m^2).  Wt Readings from Last 3 Encounters:   08/14/18 154 lb (69.9 kg)   05/30/18 160 lb (72.6 kg)   05/22/18 160 lb (72.6 kg)       General Appearance:     Alert, cooperative and in no acute distress.   ENT/Mouth: membranes moist, no oral lesions or bleeding gums.      EYES:  no scleral icterus, normal conjunctivae   Neck: no carotid bruits or thyromegaly   Chest/Lungs:   lungs are clear to auscultation, no rales or wheezing, respirations unlabored   Cardiovascular:   Normal first and second heart sounds with no murmurs, rubs, or gallops; the carotid, radial and posterior tibial pulses are intact, Jugular venous pressure flat with no HJR,  Mild pretibial edema bilateral lower extremities    Abdomen:  Soft, nontender, nondistended, bowel sounds present   Extremities: no cyanosis or clubbing   Skin: warm, dry.    Neurologic: mood and affect are appropriate, alert and oriented x3      General: WNL  Eyes: WNL  Ears/Nose/Throat: WNL  Lungs: WNL  Heart: WNL  Stomach: WNL  Bladder: WNL  Muscle/Joints:  WNL  Skin: WNL  Nervous System: WNL  Mental Health: WNL     Blood: WNL     Medical History  Surgical History Family History Social History   Past Medical History:   Diagnosis Date   ? Anticoagulation goal of INR 2 to 3    ? Atrial fibrillation (H)    ? CVA (cerebral infarction)    ? DNR (do not resuscitate) 10/18/2016   ? Former smoker    ? HTN (hypertension)    ? Knee osteoarthritis    ? Onychomycosis    ? Pacemaker    ? Prostate cancer (H)     Treated with radiation brachytherapy. Followed by [facility].     ? Vitamin B12 deficiency    ? Vitiligo     Past Surgical History:   Procedure Laterality Date   ? APPENDECTOMY          ? HERNIA REPAIR          ? LAMINECTOMY          ? PARTIAL GASTRECTOMY           Family History   Problem Relation Age of Onset   ? Cancer Mother    ? Kidney disease Father    ? Heart disease Brother    ? Lung cancer Brother    ? Stroke Sister    ? Heart disease Sister     Social History     Social History   ? Marital status:      Spouse name: Sydni   ? Number of children: 0   ? Years of education: N/A     Occupational History   ?  Retired     Social History Main Topics   ? Smoking status: Former Smoker   ? Smokeless tobacco: Never Used   ? Alcohol use 1.5 oz/week     3 Standard drinks or equivalent per week   ? Drug use: Not on file   ? Sexual activity: Not on file     Other Topics Concern   ? Not on file     Social History Narrative    .  Lives in home with wife.        Former .          Medications  Allergies   Current Outpatient Prescriptions   Medication Sig Dispense Refill   ? amLODIPine (NORVASC) 5 MG tablet Take 1 tablet (5 mg total) by mouth daily. 90 tablet 3   ? cyanocobalamin (VITAMIN B-12) 1000 MCG tablet Take 1,000 mcg by mouth daily.     ? diclofenac sodium (VOLTAREN) 1 % Gel Apply 1 application topically 3 (three) times a day. Each application should be 2-4 grams. 1 Tube 3   ? digoxin (LANOXIN) 125 mcg tablet TAKE ONE TABLET BY MOUTH DAILY 90  tablet 2   ? FOLIC ACID/MV,FE,MIN (CENTRUM ORAL) Take 1 tablet by mouth daily.      ? gabapentin (NEURONTIN) 100 MG capsule Take 100 mg by mouth 3 (three) times a day. 90 capsule 3   ? metoprolol succinate (TOPROL XL) 50 MG 24 hr tablet Take 1 tablet (50 mg total) by mouth daily. 90 tablet 3   ? nitroglycerin (NITROSTAT) 0.4 MG SL tablet Place 0.4 mg under the tongue as needed for chest pain.     ? warfarin (COUMADIN) 2.5 MG tablet Take 0.5-1 tablets (1.25-2.5 mg total) by mouth daily. Adjust dose per INR results as directed. 75 tablet 1   ? tamsulosin (FLOMAX) 0.4 mg Cp24 Take 1 capsule (0.4 mg total) by mouth daily. 90 capsule 3     No current facility-administered medications for this visit.       Allergies   Allergen Reactions   ? Ciprofloxacin    ? Erythromycin Base    ? Oxycodone-Acetaminophen    ? Sulfa (Sulfonamide Antibiotics)          Lab Results    Chemistry CBC BNP   Lab Results   Component Value Date    CREATININE 1.12 05/07/2018    BUN 18 05/07/2018     (L) 05/07/2018    K 4.5 05/07/2018    CL 99 05/07/2018    CO2 23 05/07/2018     Creatinine (mg/dL)   Date Value   05/07/2018 1.12   11/07/2017 0.84   08/25/2017 1.01   12/29/2016 1.22    Lab Results   Component Value Date    WBC 9.8 05/07/2018    HGB 12.4 (L) 05/07/2018    HCT 36.0 (L) 05/07/2018    MCV 94 05/07/2018     05/07/2018    Lab Results   Component Value Date     (H) 11/29/2013     BNP (pg/mL)   Date Value   11/29/2013 314 (H)        Adis Wang CNP  [facility]   Heart Failure Clinic         This note has been dictated using voice recognition software. Any grammatical, typographical, or context distortions are unintentional and inherent to the software

## 2021-06-26 NOTE — PROGRESS NOTES
Progress Notes by Janes Marie MD at 5/22/2018  9:40 AM     Author: Janes Marie MD Service: -- Author Type: Physician    Filed: 5/22/2018 10:58 AM Encounter Date: 5/22/2018 Status: Signed    : Janes Marie MD (Physician)              [facility]    Comprehensive and complex medical care - Chronic disease management - Shared decision making - Care coordination - Compassionate care    Patient advocacy - Rational deprescribing - Minimally disruptive medicine - Ethical focus - Customized care    ______________________________________________________________________     Date of Service: 5/22/2018  Primary Provider: Janes Marie MD    Patient Care Team:  Janes Marie MD as PCP - General (Internal Medicine)  William Corbett MD as Physician (Cardiology)  Cam Weston MD as Physician (Urology)  Adis Wang NP as Nurse Practitioner (Cardiology)     ______________________________________________________________________     Patient's Pharmacy:    [id number] [facility] - [place], [place] - [address]  [facility] [place] [address]  Phone: [phone] Fax: [phone]     Patient's Contacts:  Name Home Phone Work Phone Mobile Phone Relationship [address] [phone]   Other    DELORIS ZHENG [phone]   Spouse      Patient's Insurance:    Payor: UCARE / Plan: UCARE FOR SENIORS / Product Type: MEDICARE ADVANTAGE /     ______________________________________________________________________     Raymond Zheng is 89 y.o. male who comes in today for:    Chief Complaint   Patient presents with   ? Hospital Visit Follow Up     [id number]/90s, and low pulse 40-60s   ? medication check       Patient Active Problem List   Diagnosis   ? Prostate cancer - 2002 - [place] [facility] - Treated with radiation brachytherapy. Followed by [facility].   ? Pacemaker   ? Atrial fibrillation   ? Vitamin B12 deficiency   ? Knee  osteoarthritis   ? CVA (cerebral infarction)   ? HTN (hypertension)   ? Former smoker   ? DNR (do not resuscitate)   ? Anticoagulation goal of INR 2 to 3   ? Low back pain   ? Multilevel foraminal stenosis, lumbar      Current Outpatient Prescriptions   Medication Sig Note   ? amLODIPine (NORVASC) 2.5 MG tablet Take 2 tablets (5 mg total) by mouth daily.    ? cyanocobalamin (VITAMIN B-12) 1000 MCG tablet Take 1,000 mcg by mouth daily.    ? digoxin (LANOXIN) 125 mcg tablet TAKE ONE TABLET BY MOUTH DAILY    ? FOLIC ACID/MV,FE,MIN (CENTRUM ORAL) Take 1 tablet by mouth daily.     ? metoprolol succinate (TOPROL XL) 50 MG 24 hr tablet Take 1 tablet (50 mg total) by mouth daily. (Patient taking differently: Take 50 mg by mouth daily. Take 25 mg in the morning and 50 mg in the evening)    ? tamsulosin (FLOMAX) 0.4 mg Cp24 TAKE 1 CAPSULE BY MOUTH DAILY.    ? warfarin (COUMADIN) 2.5 MG tablet Take 0.5-1 tablets (1.25-2.5 mg total) by mouth daily. Adjust dose per INR results as directed.    ? diclofenac sodium (VOLTAREN) 1 % Gel Apply 1 application topically 3 (three) times a day. Each application should be 2-4 grams.    ? gabapentin (NEURONTIN) 100 MG capsule Take 100 mg by mouth 3 (three) times a day. 1/15/2018: As needed   ? nitroglycerin (NITROSTAT) 0.4 MG SL tablet Place 0.4 mg under the tongue as needed for chest pain.      Social History     Social History Narrative    .  Lives in home with wife.        Former .     ______________________________________________________________________     History of present illness:    Patient comes in today with his wife.    We reviewed his high blood pressure.  He had went into the emergency room due to high blood pressure.  He was started his blood pressure was in the 180s and now is more in the 150s.  On amlodipine and this has been helpful for him.  He has had a lower pulse but has a pacemaker in place.  He denies any dizziness, shortness of breath, swelling,  headaches.  We reviewed cardiology's notes in relationship to this.    Reviewed memory loss with the patient.  This has been gradual over time.  It may have been more pronounced when he was in with the blood pressure issue.  There was no signs of stroke and no focal weakness or numbness.  We reviewed this with him and his wife.  There is no signs of NPH on the testing.    Reviewed his low back pain.  He has foraminal stenosis at multiple levels.  This still does bother him quite a bit.  He did have radiofrequency ablation at the spine clinic without any help related to this.    His atrial fibrillation is stable at this point.    On review of systems, the patient denies any chest pain or shortness of breath.    ______________________________________________________________________    Exam:    Wt Readings from Last 3 Encounters:   05/22/18 160 lb (72.6 kg)   05/15/18 159 lb (72.1 kg)   05/07/18 165 lb (74.8 kg)     BP Readings from Last 3 Encounters:   05/22/18 (!) 152/94   05/15/18 152/70   05/07/18 (!) 185/94     BP (!) 152/94  Pulse (!) 54  Wt 160 lb (72.6 kg)  SpO2 100%  BMI 23.63 kg/m2    The patient is comfortable, no acute distress.  Mood good.  Insight fair to Good.  Eyes are nonicteric.  Neck is supple without mass.  No cervical adenopathy.  No thyromegaly. Heart regular rate and rhythm.  Lungs clear to auscultation bilaterally.  Respiratory effort is good.  Abdomen soft and nontender.  No hepatosplenomegaly.  Extremities no edema.  He walks stiffly due to back issues.  He does have a difficult time getting up on the examination table.    ______________________________________________________________________    Diagnostics:    Results for orders placed or performed during the hospital encounter of 05/07/18   Basic Metabolic Panel   Result Value Ref Range    Sodium 134 (L) 136 - 145 mmol/L    Potassium 4.5 3.5 - 5.0 mmol/L    Chloride 99 98 - 107 mmol/L    CO2 23 22 - 31 mmol/L    Anion Gap, Calculation 12  5 - 18 mmol/L    Glucose 104 70 - 125 mg/dL    Calcium 10.0 8.5 - 10.5 mg/dL    BUN 18 8 - 28 mg/dL    Creatinine 1.12 0.70 - 1.30 mg/dL    GFR MDRD Af Amer >60 >60 mL/min/1.73m2    GFR MDRD Non Af Amer >60 >60 mL/min/1.73m2   Troponin I   Result Value Ref Range    Troponin I 0.05 0.00 - 0.29 ng/mL   HEM 2 Without Diff   Result Value Ref Range    WBC 9.8 4.0 - 11.0 thou/uL    RBC 3.83 (L) 4.40 - 6.20 mill/uL    Hemoglobin 12.4 (L) 14.0 - 18.0 g/dL    Hematocrit 36.0 (L) 40.0 - 54.0 %    MCV 94 80 - 100 fL    MCH 32.4 27.0 - 34.0 pg    MCHC 34.4 32.0 - 36.0 g/dL    RDW 13.6 11.0 - 14.5 %    Platelets 370 140 - 440 thou/uL    MPV 10.8 8.5 - 12.5 fL   Protime-INR   Result Value Ref Range    INR 2.50 (H) 0.90 - 1.10   APTT   Result Value Ref Range    PTT 43 (H) 24 - 37 seconds   Magnesium   Result Value Ref Range    Magnesium 2.1 1.8 - 2.6 mg/dL   Urinalysis-UC if Indicated   Result Value Ref Range    Color, UA Yellow Colorless, Yellow, Straw, Light Yellow    Clarity, UA Clear Clear    Glucose, UA Negative Negative    Bilirubin, UA Negative Negative    Ketones, UA Negative Negative, 60 mg/dL    Specific Gravity, UA 1.012 1.001 - 1.030    Blood, UA Trace (!) Negative    pH, UA 6.0 4.5 - 8.0    Protein,  mg/dL (!) Negative mg/dL    Urobilinogen, UA <2.0 E.U./dL <2.0 E.U./dL, 2.0 E.U./dL    Nitrite, UA Negative Negative    Leukocytes, UA Negative Negative    Bacteria, UA Few (!) None Seen hpf    RBC, UA 0-2 None Seen, 0-2 hpf    WBC, UA 0-5 None Seen, 0-5 hpf    Squam Epithel, UA 0-5 None Seen, 0-5 lpf    Mucus, UA Few (!) None Seen lpf   ECG 12 lead   Result Value Ref Range    SYSTOLIC BLOOD PRESSURE  mmHg    DIASTOLIC BLOOD PRESSURE  mmHg    VENTRICULAR RATE 61 BPM    ATRIAL RATE 394 BPM    P-R INTERVAL  ms    QRS DURATION 86 ms    Q-T INTERVAL 392 ms    QTC CALCULATION (BEZET) 394 ms    P Axis  degrees    R AXIS 17 degrees    T AXIS -55 degrees    MUSE DIAGNOSIS       Demand pacemaker; interpretation is based on  intrinsic rhythm  Atrial fibrillation  Nonspecific ST and T wave abnormality , probably digitalis effect  Abnormal ECG  When compared with ECG of 22-AUG-2009 14:54,  No significant change was found  Confirmed by SHANNA GAN MD LOC:[facility] ([id number]) on 5/7/2018 4:51:56 PM        ______________________________________________________________________    Pertinent radiology for this visit includes the following:    Xr Chest 2 Views    Result Date: 5/7/2018  XR CHEST 2 VIEWS 5/7/2018 3:52 PM INDICATION: Chest Pain COMPARISON: 8/22/2009 FINDINGS: No change. Mild cardiomegaly with single lead pacemaker present. Pulmonary vessels normal. Lungs clear. Old healed right-sided rib fractures.    Ct Head Without Contrast    Result Date: 5/7/2018  [facility] CT HEAD WO CONTRAST 5/7/2018 4:01 PM INDICATION: altered mental status altered mental status TECHNIQUE: Without IV contrast. Dose reduction techniques were used. CONTRAST: None COMPARISON:  CT head 04/22/2009 FINDINGS: No intracranial hemorrhage, extraaxial collection, mass effect or CT evidence of acute infarct.  Unchanged mild diffuse parenchymal volume loss with ex vacuo ventricular dilatation. Slight interval increase in periventricular low-attenuation. Osseous structures are intact. The visualized orbits, paranasal sinuses and mastoid air cells are free of significant disease.     CONCLUSION: 1.  No acute intracranial abnormality. 2.  Slight interval increase in periventricular low-attenuation when compared to 08/22/2009, which may relate to progression of sequela of chronic microvascular ischemic change.       ______________________________________________________________________    ______________________________________________________________________    Assessment:    1. Short-term memory loss    2. HTN (hypertension)    3. Problem with medical care compliance - memory    4. Low back pain    5. Multilevel foraminal stenosis, lumbar        ______________________________________________________________________     PHQ-2 Total Score: 0 (5/22/2018  9:00 AM)  No Data Recorded  ______________________________________________________________________       Plan:    1. No concerning findings on CT scan related to memory.  2. We will continue to work on blood pressure as we are able.  We are going to increase the amlodipine to 5 mg a day.  We can send in a prescription for the 5 mg pills when this comes to past.  3. Reviewed previous blood work, urine tests, and CT scan.  4. Back issue is problematic for the patient and previous studies reviewed.  5. Some of the issue with blood pressure may be due to compliance issues related to his memory loss.  Please consider simplifying his metoprolol regimen.       Janes Marie MD  General Internal Medicine  [facility]     Personal office fax - [phone]   Voice mail - [phone]  E-mail - [email or web address]      Return in about 3 months (around 8/22/2018) for follow up visit.     Future Appointments  Date Time Provider Department Center   5/22/2018 2:50 PM [facility]   5/30/2018 1:30 PM Adis Wang NP [facility]        ______________________________________________________________________    Relevant ICD-10 codes and order associations:      ICD-10-CM    1. Short-term memory loss R41.3    2. HTN (hypertension) I10 amLODIPine (NORVASC) 2.5 MG tablet   3. Problem with medical care compliance - memory Z91.19    4. Low back pain M54.5    5. Multilevel foraminal stenosis, lumbar M48.00

## 2021-06-27 NOTE — PROGRESS NOTES
Progress Notes by Janes Marie MD at 2/1/2019 10:55 AM     Author: Janes Marie MD Service: -- Author Type: Physician    Filed: 2/1/2019 11:23 PM Encounter Date: 2/1/2019 Status: Signed    : Janes Marie MD (Physician)              [facility]    Comprehensive and complex medical care - Chronic disease management - Shared decision making - Care coordination - Compassionate care    Patient advocacy - Rational deprescribing - Minimally disruptive medicine - Ethical focus - Customized care    ______________________________________________________________________     Date of Service: 2/1/2019  Primary Provider: Janes Marie MD    Patient Care Team:  Janes Marie MD as PCP - General (Internal Medicine)  William Corbett MD as Physician (Cardiology)  Cam Weston MD as Physician (Urology)  Adis Wang NP as Nurse Practitioner (Cardiology)  Bill Figueroa MD as Physician (Ophthalmology)     ______________________________________________________________________     Patient's Pharmacy:    [id number] - [place], [place] - [address] AT [facility] [address]  [address]  [place] [address]  Phone: [phone] Fax: [phone]     Patient's Contacts:  Name Home Phone Work Phone Mobile Phone Relationship [address] [phone]   DELORIS Narvaez [phone]   Spouse      Patient's Insurance:    Payor: HUMANA / Plan: [employer] MEDICARE ADVANTAGE PLAN / Product Type: MEDICARE ADVANTAGE /     ______________________________________________________________________     Raymond Zheng is 89 y.o. male who comes in today for:    Chief Complaint   Patient presents with   ? Follow-up     lower extremity edema, a fib, bp   ? Labs Only     inr       Active Problem List:  Problem List as of 2/1/2019 Reviewed: 2/1/2019 11:19 PM by Janes Marie MD [facility]    DNR (do not resuscitate)     Former smoker    Atrial fibrillation (H)    CVA (cerebral infarction)    Prostate cancer (H) - 2002 - [place] 7 - Treated with radiation brachytherapy. Followed by [facility].  Evidence of biochemical recurrence.       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 cyanocobalamin (VITAMIN B-12) 1000 MCG tablet Take 1,000 mcg by mouth daily.   ? digoxin (LANOXIN) 125 mcg tablet TAKE ONE TABLET BY MOUTH DAILY   ? FOLIC ACID/MV,FE,MIN (CENTRUM ORAL) Take 1 tablet by mouth daily.    ? metoprolol succinate (TOPROL XL) 50 MG 24 hr tablet Take 1 tablet (50 mg total) by mouth daily.   ? nitroglycerin (NITROSTAT) 0.4 MG SL tablet Place 0.4 mg under the tongue as needed for chest pain.   ? tamsulosin (FLOMAX) 0.4 mg Cp24 Take 1 capsule (0.4 mg total) by mouth daily.   ? warfarin (COUMADIN) 2.5 MG tablet Take 0.5-1 tablets (1.25-2.5 mg total) by mouth daily. Adjust dose per INR results as directed   ? torsemide (DEMADEX) 10 MG tablet Take 1 tablet (10 mg total) by mouth daily.     Social History     Social History Narrative    .  Lives in home with wife.        Former .     ______________________________________________________________________     History of present illness:    Patient comes in today with his wife.    We first reviewed his lower extremity edema.  Since being off the Norvasc, his swelling in his legs have improved markedly.  He has some very extremely mild shortness of breath but this has not worsened.  He denies any chest pain.    He does still have the edema in his legs, but his breathing has been good.  His previous echocardiogram was reviewed.   He does see his cardiologist again in the near future.    We reviewed his prostate cancer and things are stable here.    We reviewed his atrial fibrillation and he does need a check of his warfarin.  His heart rate seems to be under control.    We reviewed his other issues noted in the assessment but not specifically addressed in the HPI above.     On review of systems, the patient denies any chest pain or shortness of breath.    ______________________________________________________________________    Exam:    Wt Readings from Last 3 Encounters:   02/01/19 170 lb (77.1 kg)   12/11/18 170 lb (77.1 kg)   11/21/18 169 lb (76.7 kg)     BP Readings from Last 3 Encounters:   02/01/19 (!) 172/94   12/11/18 134/70   11/21/18 120/66     BP (!) 172/94   Pulse 67   Wt 170 lb (77.1 kg)   SpO2 96%   BMI 24.92 kg/m     The patient is comfortable, no acute distress.  Mood good.  Insight good.  Eyes are nonicteric.  Neck is supple without mass.  No cervical adenopathy.  No thyromegaly. Heart regular rate and rhythm.  Lungs clear to auscultation bilaterally.  Respiratory effort is good.  Abdomen soft and nontender.  No hepatosplenomegaly.  Extremities 1+ edema.    ______________________________________________________________________    Diagnostics:    Results for orders placed or performed in visit on 02/01/19   BNP(B-type Natriuretic Peptide)   Result Value Ref Range    BNP 1,032 (H) 0 - 93 pg/mL   HM2(CBC w/o Differential)   Result Value Ref Range    WBC 7.5 4.0 - 11.0 thou/uL    RBC 4.10 (L) 4.40 - 6.20 mill/uL    Hemoglobin 13.1 (L) 14.0 - 18.0 g/dL    Hematocrit 38.9 (L) 40.0 - 54.0 %    MCV 95 80 - 100 fL    MCH 31.9 27.0 - 34.0 pg    MCHC 33.6 32.0 - 36.0 g/dL    RDW 11.5 11.0 - 14.5 %    Platelets 359 140 - 440 thou/uL    MPV 8.5 7.0 - 10.0 fL   Comprehensive Metabolic Panel   Result Value Ref Range    Sodium 136 136 - 145 mmol/L    Potassium 4.8 3.5 - 5.0 mmol/L    Chloride 102 98 - 107 mmol/L    CO2 25 22 - 31 mmol/L     Anion Gap, Calculation 9 5 - 18 mmol/L    Glucose 94 70 - 125 mg/dL    BUN 21 8 - 28 mg/dL    Creatinine 1.21 0.70 - 1.30 mg/dL    GFR MDRD Af Amer >60 >60 mL/min/1.73m2    GFR MDRD Non Af Amer 56 (L) >60 mL/min/1.73m2    Bilirubin, Total 1.3 (H) 0.0 - 1.0 mg/dL    Calcium 10.5 8.5 - 10.5 mg/dL    Protein, Total 7.4 6.0 - 8.0 g/dL    Albumin 4.0 3.5 - 5.0 g/dL    Alkaline Phosphatase 151 (H) 45 - 120 U/L    AST 30 0 - 40 U/L    ALT 19 0 - 45 U/L   INR   Result Value Ref Range    INR 2.70 (H) 0.90 - 1.10     ______________________________________________________________________    Assessment:    1. Bilateral lower extremity edema    2. Chronic atrial fibrillation (H)    3. Cerebral infarction, unspecified mechanism (H)    4. 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 Dyspnea, unspecified type     6. Essential hypertension    7. Prostate cancer (H) - 2002 - [place] 7 - Treated with radiation brachytherapy. Followed by [facility].  Evidence of biochemical recurrence.    8. Mild pulmonary hypertension (H)      ______________________________________________________________________     PHQ-2 Total Score: 0 (10/2/2018  2:00 PM)    No Data Recorded  ______________________________________________________________________    Plan:    1. Will add torsemide to his current regimen.  2. Stay off of Norvasc given worsening edema.  3. He will see cardiology in the meantime.  4. He will be following up with me in about 6-8 weeks.  5. Encourage them to continue to look into assisted living.    Janes Marie MD  General Internal Medicine  [facility]     Return in about 2 months (around 4/1/2019) for follow up visit.     Future Appointments   Date Time Provider Department Center   3/1/2019 10:10 AM [facility]   3/8/2019  1:20 PM [facility] DEVICE NURSE 1 [facility]   3/8/2019  2:10 PM William Corbett MD [facility]          ______________________________________________________________________     Relevant ICD-10 codes and order associations:      ICD-10-CM    1. Bilateral lower extremity edema R60.0 BNP(B-type Natriuretic Peptide)     Comprehensive Metabolic Panel   2. Chronic atrial fibrillation (H) I48.2 BNP(B-type Natriuretic Peptide)     HM2(CBC w/o Differential)     Comprehensive Metabolic Panel     INR   3. Cerebral infarction, unspecified mechanism (H) I63.9 INR   4. 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 Dyspnea, unspecified type  R06.00 BNP(B-type Natriuretic Peptide)   6. Essential hypertension I10    7. Prostate cancer (H) - 2002 - [place] 7 - Treated with radiation brachytherapy. Followed by [facility].  Evidence of biochemical recurrence. C61    8. Mild pulmonary hypertension (H) I27.20

## 2021-06-27 NOTE — PROGRESS NOTES
Progress Notes by Janes Marie MD at 12/11/2018  1:25 PM     Author: Janes Marie MD Service: -- Author Type: Physician    Filed: 12/11/2018 10:38 PM Encounter Date: 12/11/2018 Status: Signed    : Janes Marie MD (Physician)              [facility]    Comprehensive and complex medical care - Chronic disease management - Shared decision making - Care coordination - Compassionate care    Patient advocacy - Rational deprescribing - Minimally disruptive medicine - Ethical focus - Customized care    ______________________________________________________________________     Date of Service: 12/11/2018  Primary Provider: Janes Marie MD    Patient Care Team:  Janes Marie MD as PCP - General (Internal Medicine)  William Corbett MD as Physician (Cardiology)  Cam Weston MD as Physician (Urology)  Adis Wang NP as Nurse Practitioner (Cardiology)  Bill Figueroa MD as Physician (Ophthalmology)     ______________________________________________________________________     Patient's Pharmacy:    [id number] - [place], [place] - [address] AT [facility] [address]  [address]  [place] [address]  Phone: [phone] Fax: [phone]     Patient's Contacts:  Name Home Phone Work Phone Mobile Phone Relationship [address] [phone]   DELORIS Narvaez [phone]   Spouse      Patient's Insurance:    Payor: UCARE / Plan: [employer] FOR S[employer]IORS / Product Type: MEDICARE ADVANTAGE /     ______________________________________________________________________     Raymond Zheng is 89 y.o. male who comes in today for:    Chief Complaint   Patient presents with   ? Follow-up     BILATERAL LOWER EXTREMITY EDEMA IS BETTER STILL SWELLING   ? Labs Only     INR       Active Problem List:  Problem List as of 12/11/2018 Reviewed: 12/11/2018 10:35 PM by Janes Marie MD       [facility]    DNR  (do not resuscitate)    Former smoker    Atrial fibrillation (H)    CVA (cerebral infarction)    Prostate cancer (H) - 2002 - [place] [facility] - Treated with radiation brachytherapy. Followed by [facility].  Evidence of biochemical recurrence.       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 cyanocobalamin (VITAMIN B-12) 1000 MCG tablet Take 1,000 mcg by mouth daily.   ? digoxin (LANOXIN) 125 mcg tablet TAKE ONE TABLET BY MOUTH DAILY   ? FOLIC ACID/MV,FE,MIN (CENTRUM ORAL) Take 1 tablet by mouth daily.    ? metoprolol succinate (TOPROL XL) 50 MG 24 hr tablet Take 1 tablet (50 mg total) by mouth daily.   ? nitroglycerin (NITROSTAT) 0.4 MG SL tablet Place 0.4 mg under the tongue as needed for chest pain.   ? tamsulosin (FLOMAX) 0.4 mg Cp24 Take 1 capsule (0.4 mg total) by mouth daily.   ? warfarin (COUMADIN) 2.5 MG tablet Take 0.5-1 tablets (1.25-2.5 mg total) by mouth daily. Adjust dose per INR results as directed     Social History     Social History Narrative    .  Lives in home with wife.        Former .     ______________________________________________________________________     History of present illness:    Patient comes in today for follow-up of a number of issues.  He comes in today with his wife.    We reviewed his lower extremity edema.  He recently saw Cathryn Barkley.  He reduced his amlodipine to 2.5 mg a day.  This did help with his swelling.  He still has swelling and would like to work on this further.  He denies any shortness of breath associated with this.  We reviewed the blood work from his visit.  He is not having any weeping from his  legs.    We reviewed his high blood pressure his blood pressure is doing fine at this point.  He would rather try off of the medication and adjust the medication further if needed in the future.    Reviewed his atrial fibrillation and he is on his blood thinner.  His metoprolol and digoxin are also continued at this point.    We reviewed his prostate cancer.  He has signs of biochemical recurrence.  He is being followed by urology.    Reviewed his previous echocardiogram which shows evidence of mild pulmonary hypertension and right ventricular dysfunction.    We reviewed his other issues noted in the assessment but not specifically addressed in the HPI above.     On review of systems, the patient denies any chest pain or shortness of breath.    ______________________________________________________________________    Exam:    Wt Readings from Last 3 Encounters:   12/11/18 170 lb (77.1 kg)   11/21/18 169 lb (76.7 kg)   10/02/18 161 lb 3.2 oz (73.1 kg)     BP Readings from Last 3 Encounters:   12/11/18 134/70   11/21/18 120/66   10/02/18 120/60     /70 (Patient Site: Right Arm, Patient Position: Sitting, Cuff Size: Adult Regular)   Pulse 66   Wt 170 lb (77.1 kg)   SpO2 98%   BMI 24.92 kg/m     The patient is comfortable, no acute distress.  Mood good.  Insight fair.  Eyes are nonicteric.  Neck is supple without mass.  No cervical adenopathy.  No thyromegaly. Heart irregular rate and rhythm.  Lungs clear to auscultation bilaterally.  Respiratory effort is good.  Abdomen soft and nontender.  No hepatosplenomegaly.  Extremities 1-2+ edema.    ______________________________________________________________________    Diagnostics:    Results for orders placed or performed in visit on 12/11/18   INR   Result Value Ref Range    INR 1.90 (H) 0.90 - 1.10     ______________________________________________________________________    Assessment:    1. Bilateral lower extremity edema    2. Chronic atrial fibrillation (H)     3. Cerebral infarction, unspecified mechanism (H)    4. Prostate cancer - 2002 - [place] [facility] - Treated with radiation brachytherapy. Followed by [facility].  Evidence of biochemical recurrence.    5. Memory difficulties    6. Mild pulmonary hypertension (H)    7. Essential hypertension    8. 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 Stop amlodipine and see how he does.  2. Follow-up again in 2 months.  3. Consider further adjustment of medication.  4. Consider further blood work at the next visit.  5. Consider further guidance as needed for his memory issues.    Janes Marie MD  General Internal Medicine  [facility]     Return in about 2 months (around 2/11/2019) for follow up visit.     Future Appointments   Date Time Provider Department Center   2/1/2019 10:55 AM Janes Marie MD [facility]         ______________________________________________________________________     Relevant ICD-10 codes and order associations:      ICD-10-CM    1. Bilateral lower extremity edema R60.0    2. Chronic atrial fibrillation (H) I48.2 INR   3. Cerebral infarction, unspecified mechanism (H) I63.9 INR   4. Prostate cancer - 2002 - [place] 7 - Treated with radiation brachytherapy. Followed by [facility].  Evidence of biochemical recurrence. C61    5. Memory difficulties R41.3    6. Mild pulmonary hypertension (H) I27.20    7. Essential hypertension I10    8. 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

## 2021-07-14 PROBLEM — F43.21 GRIEF: Status: RESOLVED | Noted: 2020-01-01 | Resolved: 2020-01-01

## 2021-08-30 NOTE — TELEPHONE ENCOUNTER
ANTICOAGULATION  MANAGEMENT    Assessment     Today's INR result of 2.15 is Therapeutic (goal INR of 2.0-3.0)        Previous INR was Therapeutic    Warfarin given as previously instructed    No new health/diet changes affecting INR    No new medication/supplements affecting INR    Continues to tolerate warfarin with no reported s/s of bleeding or thromboembolism       Plan:     Warfarin Dosing Orders:  Continue current warfarin dose 1.25 mg daily     Next INR: 1 week    Telephone orders given to nurseManny.  Orders read back correctly.     Vanita Ashton RN    Subjective/Objective:      Raymond Zheng, a 89 y.o. male is on warfarin. Facility nurse reports for Raymond:    Other anticoagulants: No    Medication changes: No     Missed warfarin doses since last INR: No     Abnormal bleeding since last INR: No    New symptoms, injury or illness: No     Upcoming surgery, procedure or cardioversion: No    Recent INR Results:    Lab Results   Component Value Date    INR 2.15 (H) 04/19/2019    INR 2.48 (H) 04/12/2019    INR 3.68 (H) 04/05/2019       Anticoagulation Episode Summary     Current INR goal:   2.0-3.0   TTR:   85.3 % (4.3 y)   Next INR check:   4/26/2019   INR from last check:   2.15 (4/19/2019)   Weekly max warfarin dose:      Target end date:      INR check location:      Preferred lab:      Send INR reminders to:   ANTICOAGULATION POOL E ([facility])    Indications    A-fib (H) (Resolved) [I48.91]           Comments:            Anticoagulation Care Providers     Provider Role Specialty Phone number    Delmi Regan NP Responsible Nurse Practitioner [phone]         normal...

## 2024-09-29 NOTE — TELEPHONE ENCOUNTER
[facility] Nurse Triage Telephone Note      Provider: ANABELA Contreras  Facility: [facility]    Facility Type: [facility]    Caller: Alicja   Call Back Number:  [phone]    Allergies: Ciprofloxacin; Erythromycin base; Oxycodone-acetaminophen; and Sulfa (sulfonamide antibiotics)    Reason for call: This pt recently switched over to the facility passing his medications and the nursing staff was questioning him about his order for eye drops and he stated that he doesn't have any eye drops because he doesn't have dry eye.   Current order for  Atropine and pred-forte   Nursing staff is wondering if they should be giving the eye drops and if the NP was aware that he wasn't using his eye drops    Verbal Order/Direction given by Provider: d/c eye drops     Provider giving order: ANABELA Contreras    Verbal order given to: Alicja Jules RN      
unknown